# Patient Record
Sex: FEMALE | Race: ASIAN | NOT HISPANIC OR LATINO
[De-identification: names, ages, dates, MRNs, and addresses within clinical notes are randomized per-mention and may not be internally consistent; named-entity substitution may affect disease eponyms.]

---

## 2023-08-14 ENCOUNTER — NON-APPOINTMENT (OUTPATIENT)
Age: 33
End: 2023-08-14

## 2023-08-14 ENCOUNTER — RESULT REVIEW (OUTPATIENT)
Age: 33
End: 2023-08-14

## 2023-08-14 ENCOUNTER — APPOINTMENT (OUTPATIENT)
Dept: INTERNAL MEDICINE | Facility: CLINIC | Age: 33
End: 2023-08-14
Payer: MEDICAID

## 2023-08-14 VITALS
HEART RATE: 90 BPM | OXYGEN SATURATION: 95 % | RESPIRATION RATE: 15 BRPM | TEMPERATURE: 98.2 F | DIASTOLIC BLOOD PRESSURE: 60 MMHG | WEIGHT: 103 LBS | SYSTOLIC BLOOD PRESSURE: 102 MMHG | BODY MASS INDEX: 21.62 KG/M2 | HEIGHT: 58 IN

## 2023-08-14 DIAGNOSIS — Z78.9 OTHER SPECIFIED HEALTH STATUS: ICD-10-CM

## 2023-08-14 DIAGNOSIS — Z00.00 ENCOUNTER FOR GENERAL ADULT MEDICAL EXAMINATION W/OUT ABNORMAL FINDINGS: ICD-10-CM

## 2023-08-14 DIAGNOSIS — M54.31 SCIATICA, RIGHT SIDE: ICD-10-CM

## 2023-08-14 PROCEDURE — 99385 PREV VISIT NEW AGE 18-39: CPT

## 2023-08-14 NOTE — REVIEW OF SYSTEMS
[Joint Pain] : joint pain [Back Pain] : back pain [Fever] : no fever [Chills] : no chills [Fatigue] : no fatigue [Discharge] : no discharge [Pain] : no pain [Earache] : no earache [Chest Pain] : no chest pain [Palpitations] : no palpitations [Lower Ext Edema] : no lower extremity edema [Shortness Of Breath] : no shortness of breath [Wheezing] : no wheezing [Cough] : no cough [Dyspnea on Exertion] : no dyspnea on exertion [Abdominal Pain] : no abdominal pain [Nausea] : no nausea [Constipation] : no constipation [Diarrhea] : diarrhea [Dysuria] : no dysuria [Incontinence] : no incontinence [Joint Stiffness] : no joint stiffness [Joint Swelling] : no joint swelling [Muscle Weakness] : no muscle weakness

## 2023-08-14 NOTE — PLAN
[FreeTextEntry1] : HCM - cbc, cmp, a1c, tsh, lipid panel, iron studies, b12, folate - f/u with gyn for pap and hx of irregular periods  chronic low back pain  - check XR lumbar spine - rx for naproxen - PT referral - MRI if no improvement with above   Abnormal Breath Sounds - diffuse wheezing/rhonchi on exam  - O2 sat 95% - pt reports asympatomatic  - ?asthma  - f/u CXR and quantiferon  - pulm eval

## 2023-08-14 NOTE — PAST MEDICAL HISTORY
[Menstruating] : menstruating [Approximately ___] : the LMP was approximately [unfilled] [Total Preg ___] : G[unfilled] [Live Births ___] : P[unfilled]  [Full Term ___] : Full Term: [unfilled] [AB Spont ___] : miscarriages: [unfilled]

## 2023-08-14 NOTE — HISTORY OF PRESENT ILLNESS
[FreeTextEntry1] : CPE [de-identified] : 33yF hx of chronic back pain, no other pmhx here for CPE to establish care. Pt accompanied by friend, she speaks edilberto, she moved from [place] a month ago. She reports low back pain that started in November, no preceding trauma/fall/injury Takes otc motrin prn with some relief sometimes unable to bend over or walk due to the severe pain. The pain radiates down her right leg.   Not on medications, no prior surgeries, no allergies. Does not smoke or drink. Has never seen a physician in [place]; [place] [facility]. She also reports irregular periods, usually every other month will have her period with heavy bleeding and severe pain. She has a 10 year old child delivered vaginally without complications and had one miscarriage about 1 year ago. Her last period was AUgust 2nd.

## 2023-08-14 NOTE — HEALTH RISK ASSESSMENT
[No] : No [Never (0 pts)] : Never (0 points) [Never] : Never [0] : 2) Feeling down, depressed, or hopeless: Not at all (0) [PHQ-2 Negative - No further assessment needed] : PHQ-2 Negative - No further assessment needed [FVP5Wpvum] : 0 [PapSmearDate] : never

## 2023-08-14 NOTE — PHYSICAL EXAM
[No Acute Distress] : no acute distress [Well Nourished] : well nourished [Well Developed] : well developed [Normal Sclera/Conjunctiva] : normal sclera/conjunctiva [PERRL] : pupils equal round and reactive to light [Normal Oropharynx] : the oropharynx was normal [No Lymphadenopathy] : no lymphadenopathy [Supple] : supple [Thyroid Normal, No Nodules] : the thyroid was normal and there were no nodules present [No Respiratory Distress] : no respiratory distress  [No Accessory Muscle Use] : no accessory muscle use [Normal Rate] : normal rate  [Regular Rhythm] : with a regular rhythm [Normal S1, S2] : normal S1 and S2 [Soft] : abdomen soft [Non Tender] : non-tender [Non-distended] : non-distended [No Joint Swelling] : no joint swelling [de-identified] : scattered intermittent wheezing and ronchi  [de-identified] : + straight leg raise on the right

## 2023-08-15 ENCOUNTER — APPOINTMENT (OUTPATIENT)
Dept: RADIOLOGY | Facility: CLINIC | Age: 33
End: 2023-08-15
Payer: MEDICAID

## 2023-08-15 ENCOUNTER — OUTPATIENT (OUTPATIENT)
Dept: OUTPATIENT SERVICES | Facility: HOSPITAL | Age: 33
LOS: 1 days | End: 2023-08-15
Payer: MEDICAID

## 2023-08-15 DIAGNOSIS — G89.29 DORSALGIA, UNSPECIFIED: ICD-10-CM

## 2023-08-15 DIAGNOSIS — Z00.00 ENCOUNTER FOR GENERAL ADULT MEDICAL EXAMINATION WITHOUT ABNORMAL FINDINGS: ICD-10-CM

## 2023-08-15 DIAGNOSIS — M54.9 DORSALGIA, UNSPECIFIED: ICD-10-CM

## 2023-08-15 LAB
ALBUMIN SERPL ELPH-MCNC: 4.4 G/DL
ALP BLD-CCNC: 68 U/L
ALT SERPL-CCNC: 9 U/L
ANION GAP SERPL CALC-SCNC: 12 MMOL/L
AST SERPL-CCNC: 19 U/L
BILIRUB SERPL-MCNC: 0.3 MG/DL
BUN SERPL-MCNC: 12 MG/DL
CALCIUM SERPL-MCNC: 9.3 MG/DL
CHLORIDE SERPL-SCNC: 101 MMOL/L
CHOLEST SERPL-MCNC: 172 MG/DL
CO2 SERPL-SCNC: 24 MMOL/L
CREAT SERPL-MCNC: 0.68 MG/DL
EGFR: 119 ML/MIN/1.73M2
ESTIMATED AVERAGE GLUCOSE: 120 MG/DL
FERRITIN SERPL-MCNC: 21 NG/ML
FOLATE SERPL-MCNC: 2.5 NG/ML
GLUCOSE SERPL-MCNC: 80 MG/DL
HBA1C MFR BLD HPLC: 5.8 %
HDLC SERPL-MCNC: 47 MG/DL
IRON SATN MFR SERPL: 17 %
IRON SERPL-MCNC: 56 UG/DL
LDLC SERPL CALC-MCNC: 109 MG/DL
NONHDLC SERPL-MCNC: 125 MG/DL
POTASSIUM SERPL-SCNC: 4.2 MMOL/L
PROT SERPL-MCNC: 7.2 G/DL
SODIUM SERPL-SCNC: 137 MMOL/L
TIBC SERPL-MCNC: 324 UG/DL
TRIGL SERPL-MCNC: 85 MG/DL
TSH SERPL-ACNC: 1.37 UIU/ML
UIBC SERPL-MCNC: 268 UG/DL
VIT B12 SERPL-MCNC: 781 PG/ML

## 2023-08-15 PROCEDURE — 72100 X-RAY EXAM L-S SPINE 2/3 VWS: CPT | Mod: 26

## 2023-08-15 PROCEDURE — 71046 X-RAY EXAM CHEST 2 VIEWS: CPT | Mod: 26

## 2023-08-15 PROCEDURE — 72100 X-RAY EXAM L-S SPINE 2/3 VWS: CPT

## 2023-08-15 PROCEDURE — 71046 X-RAY EXAM CHEST 2 VIEWS: CPT

## 2023-08-17 DIAGNOSIS — R06.89 OTHER ABNORMALITIES OF BREATHING: ICD-10-CM

## 2023-08-18 LAB
M TB IFN-G BLD-IMP: NEGATIVE
QUANTIFERON TB PLUS MITOGEN MINUS NIL: 8.42 IU/ML
QUANTIFERON TB PLUS NIL: 0.02 IU/ML
QUANTIFERON TB PLUS TB1 MINUS NIL: 0 IU/ML
QUANTIFERON TB PLUS TB2 MINUS NIL: 0 IU/ML

## 2023-08-23 ENCOUNTER — APPOINTMENT (OUTPATIENT)
Dept: CT IMAGING | Facility: CLINIC | Age: 33
End: 2023-08-23
Payer: MEDICAID

## 2023-08-23 ENCOUNTER — OUTPATIENT (OUTPATIENT)
Dept: OUTPATIENT SERVICES | Facility: HOSPITAL | Age: 33
LOS: 1 days | End: 2023-08-23
Payer: MEDICAID

## 2023-08-23 DIAGNOSIS — J84.9 INTERSTITIAL PULMONARY DISEASE, UNSPECIFIED: ICD-10-CM

## 2023-08-23 DIAGNOSIS — R06.89 OTHER ABNORMALITIES OF BREATHING: ICD-10-CM

## 2023-08-23 PROCEDURE — 71250 CT THORAX DX C-: CPT

## 2023-08-23 PROCEDURE — 71250 CT THORAX DX C-: CPT | Mod: 26

## 2023-08-24 ENCOUNTER — APPOINTMENT (OUTPATIENT)
Dept: INTERNAL MEDICINE | Facility: CLINIC | Age: 33
End: 2023-08-24
Payer: MEDICAID

## 2023-08-24 VITALS
WEIGHT: 100 LBS | SYSTOLIC BLOOD PRESSURE: 100 MMHG | DIASTOLIC BLOOD PRESSURE: 60 MMHG | OXYGEN SATURATION: 97 % | HEIGHT: 58 IN | RESPIRATION RATE: 14 BRPM | BODY MASS INDEX: 20.99 KG/M2 | HEART RATE: 83 BPM

## 2023-08-24 PROCEDURE — 99214 OFFICE O/P EST MOD 30 MIN: CPT

## 2023-08-24 NOTE — HISTORY OF PRESENT ILLNESS
[FreeTextEntry8] : Pt here with complaint of persistent right-sided low back pain now with progression of pain radiating down the right leg along with numbness/tingling. pt has been taking naproxen which has not provided any relief. she denies bowel or bladder incontinence or saddle anesthesia.

## 2023-08-24 NOTE — PHYSICAL EXAM
[Normal] : normal rate, regular rhythm, normal S1 and S2 and no murmur heard [No Spinal Tenderness] : no spinal tenderness [de-identified] : scattered inspiratory wheezing b/l fields  [de-identified] : SLR (+) right

## 2023-08-24 NOTE — PLAN
[FreeTextEntry1] : pt with worsening right-sided low back pain with now progressive symptoms including radiation of pain with associated numbness/tingling of the right lower extremity  SLR(+) positive on the right  no response to naproxen  recommend MRI of lumbar spine to assess for disc herniation

## 2023-09-11 ENCOUNTER — APPOINTMENT (OUTPATIENT)
Dept: MRI IMAGING | Facility: CLINIC | Age: 33
End: 2023-09-11

## 2023-09-16 ENCOUNTER — APPOINTMENT (OUTPATIENT)
Dept: MRI IMAGING | Facility: CLINIC | Age: 33
End: 2023-09-16
Payer: MEDICAID

## 2023-09-16 ENCOUNTER — OUTPATIENT (OUTPATIENT)
Dept: OUTPATIENT SERVICES | Facility: HOSPITAL | Age: 33
LOS: 1 days | End: 2023-09-16
Payer: MEDICAID

## 2023-09-16 DIAGNOSIS — M54.16 RADICULOPATHY, LUMBAR REGION: ICD-10-CM

## 2023-09-16 PROCEDURE — 72148 MRI LUMBAR SPINE W/O DYE: CPT

## 2023-09-16 PROCEDURE — 72148 MRI LUMBAR SPINE W/O DYE: CPT | Mod: 26

## 2023-10-24 ENCOUNTER — APPOINTMENT (OUTPATIENT)
Dept: INTERNAL MEDICINE | Facility: CLINIC | Age: 33
End: 2023-10-24
Payer: MEDICAID

## 2023-10-24 VITALS
TEMPERATURE: 97.8 F | RESPIRATION RATE: 14 BRPM | BODY MASS INDEX: 21.2 KG/M2 | HEART RATE: 97 BPM | HEIGHT: 58 IN | WEIGHT: 101 LBS | DIASTOLIC BLOOD PRESSURE: 60 MMHG | OXYGEN SATURATION: 96 % | SYSTOLIC BLOOD PRESSURE: 102 MMHG

## 2023-10-24 PROCEDURE — 99213 OFFICE O/P EST LOW 20 MIN: CPT

## 2023-10-25 RX ORDER — NAPROXEN 500 MG/1
500 TABLET ORAL
Qty: 14 | Refills: 0 | Status: DISCONTINUED | COMMUNITY
Start: 2023-08-15 | End: 2023-10-25

## 2023-10-25 RX ORDER — NAPROXEN 500 MG/1
500 TABLET ORAL
Qty: 14 | Refills: 0 | Status: DISCONTINUED | COMMUNITY
Start: 2023-08-14 | End: 2023-10-25

## 2023-10-26 DIAGNOSIS — R06.2 WHEEZING: ICD-10-CM

## 2023-10-30 ENCOUNTER — APPOINTMENT (OUTPATIENT)
Dept: PULMONOLOGY | Facility: CLINIC | Age: 33
End: 2023-10-30
Payer: MEDICAID

## 2023-10-30 VITALS
DIASTOLIC BLOOD PRESSURE: 74 MMHG | HEART RATE: 75 BPM | SYSTOLIC BLOOD PRESSURE: 109 MMHG | OXYGEN SATURATION: 98 % | BODY MASS INDEX: 21.41 KG/M2 | WEIGHT: 102 LBS | HEIGHT: 58 IN

## 2023-10-30 PROCEDURE — 99204 OFFICE O/P NEW MOD 45 MIN: CPT | Mod: 25

## 2023-10-30 PROCEDURE — 94010 BREATHING CAPACITY TEST: CPT

## 2023-10-30 PROCEDURE — ZZZZZ: CPT

## 2023-10-30 PROCEDURE — 94729 DIFFUSING CAPACITY: CPT

## 2023-10-30 PROCEDURE — 94727 GAS DIL/WSHOT DETER LNG VOL: CPT

## 2023-10-30 RX ORDER — BLOOD-GLUCOSE METER
KIT MISCELLANEOUS
Qty: 1 | Refills: 0 | Status: ACTIVE | COMMUNITY
Start: 2023-10-30 | End: 1900-01-01

## 2023-10-31 LAB
IGA SER QL IEP: 273 MG/DL
IGM SER QL IEP: 116 MG/DL
RHEUMATOID FACT SER QL: <10 IU/ML

## 2023-11-02 LAB
ANA SER IF-ACNC: NEGATIVE
IGE SER-MCNC: 6170 KU/L

## 2023-11-06 LAB
A FLAVUS AB FLD QL: NEGATIVE
A FUMIGATUS AB FLD QL: NEGATIVE
A NIGER AB FLD QL: NEGATIVE

## 2023-11-07 LAB — CFTR MUT TESTED BLD/T: NEGATIVE

## 2023-11-08 LAB — HIV1+2 AB SPEC QL IA.RAPID: NONREACTIVE

## 2023-11-13 LAB
ANNOTATION COMMENT IMP: NORMAL
ELECTRONIC SIGNATURE: NORMAL
SERPINA1 GENE MUT TESTED BLD/T: NORMAL

## 2023-11-17 ENCOUNTER — INPATIENT (INPATIENT)
Facility: HOSPITAL | Age: 33
LOS: 12 days | Discharge: HOME | DRG: 720 | End: 2023-11-30
Attending: INTERNAL MEDICINE | Admitting: FAMILY MEDICINE
Payer: MEDICAID

## 2023-11-17 VITALS — WEIGHT: 99.21 LBS

## 2023-11-17 DIAGNOSIS — Z78.9 OTHER SPECIFIED HEALTH STATUS: Chronic | ICD-10-CM

## 2023-11-17 DIAGNOSIS — J18.9 PNEUMONIA, UNSPECIFIED ORGANISM: ICD-10-CM

## 2023-11-17 LAB
ALBUMIN SERPL ELPH-MCNC: 3 G/DL — LOW (ref 3.3–5)
ALBUMIN SERPL ELPH-MCNC: 3 G/DL — LOW (ref 3.3–5)
ALP SERPL-CCNC: 105 U/L — SIGNIFICANT CHANGE UP (ref 40–120)
ALP SERPL-CCNC: 105 U/L — SIGNIFICANT CHANGE UP (ref 40–120)
ALT FLD-CCNC: 17 U/L — SIGNIFICANT CHANGE UP (ref 12–78)
ALT FLD-CCNC: 17 U/L — SIGNIFICANT CHANGE UP (ref 12–78)
ANION GAP SERPL CALC-SCNC: 6 MMOL/L — SIGNIFICANT CHANGE UP (ref 5–17)
ANION GAP SERPL CALC-SCNC: 6 MMOL/L — SIGNIFICANT CHANGE UP (ref 5–17)
AST SERPL-CCNC: 15 U/L — SIGNIFICANT CHANGE UP (ref 15–37)
AST SERPL-CCNC: 15 U/L — SIGNIFICANT CHANGE UP (ref 15–37)
BASOPHILS # BLD AUTO: 0.05 K/UL — SIGNIFICANT CHANGE UP (ref 0–0.2)
BASOPHILS # BLD AUTO: 0.05 K/UL — SIGNIFICANT CHANGE UP (ref 0–0.2)
BASOPHILS NFR BLD AUTO: 0.2 % — SIGNIFICANT CHANGE UP (ref 0–2)
BASOPHILS NFR BLD AUTO: 0.2 % — SIGNIFICANT CHANGE UP (ref 0–2)
BILIRUB SERPL-MCNC: 0.6 MG/DL — SIGNIFICANT CHANGE UP (ref 0.2–1.2)
BILIRUB SERPL-MCNC: 0.6 MG/DL — SIGNIFICANT CHANGE UP (ref 0.2–1.2)
BUN SERPL-MCNC: 8 MG/DL — SIGNIFICANT CHANGE UP (ref 7–23)
BUN SERPL-MCNC: 8 MG/DL — SIGNIFICANT CHANGE UP (ref 7–23)
CALCIUM SERPL-MCNC: 8.9 MG/DL — SIGNIFICANT CHANGE UP (ref 8.5–10.1)
CALCIUM SERPL-MCNC: 8.9 MG/DL — SIGNIFICANT CHANGE UP (ref 8.5–10.1)
CHLORIDE SERPL-SCNC: 106 MMOL/L — SIGNIFICANT CHANGE UP (ref 96–108)
CHLORIDE SERPL-SCNC: 106 MMOL/L — SIGNIFICANT CHANGE UP (ref 96–108)
CO2 SERPL-SCNC: 24 MMOL/L — SIGNIFICANT CHANGE UP (ref 22–31)
CO2 SERPL-SCNC: 24 MMOL/L — SIGNIFICANT CHANGE UP (ref 22–31)
CREAT SERPL-MCNC: 0.77 MG/DL — SIGNIFICANT CHANGE UP (ref 0.5–1.3)
CREAT SERPL-MCNC: 0.77 MG/DL — SIGNIFICANT CHANGE UP (ref 0.5–1.3)
D DIMER BLD IA.RAPID-MCNC: 480 NG/ML DDU — HIGH
D DIMER BLD IA.RAPID-MCNC: 480 NG/ML DDU — HIGH
EGFR: 106 ML/MIN/1.73M2 — SIGNIFICANT CHANGE UP
EGFR: 106 ML/MIN/1.73M2 — SIGNIFICANT CHANGE UP
EOSINOPHIL # BLD AUTO: 0.44 K/UL — SIGNIFICANT CHANGE UP (ref 0–0.5)
EOSINOPHIL # BLD AUTO: 0.44 K/UL — SIGNIFICANT CHANGE UP (ref 0–0.5)
EOSINOPHIL NFR BLD AUTO: 2 % — SIGNIFICANT CHANGE UP (ref 0–6)
EOSINOPHIL NFR BLD AUTO: 2 % — SIGNIFICANT CHANGE UP (ref 0–6)
GLUCOSE SERPL-MCNC: 155 MG/DL — HIGH (ref 70–99)
GLUCOSE SERPL-MCNC: 155 MG/DL — HIGH (ref 70–99)
HCG SERPL-ACNC: <1 MIU/ML — SIGNIFICANT CHANGE UP
HCG SERPL-ACNC: <1 MIU/ML — SIGNIFICANT CHANGE UP
HCT VFR BLD CALC: 34.7 % — SIGNIFICANT CHANGE UP (ref 34.5–45)
HCT VFR BLD CALC: 34.7 % — SIGNIFICANT CHANGE UP (ref 34.5–45)
HGB BLD-MCNC: 11.6 G/DL — SIGNIFICANT CHANGE UP (ref 11.5–15.5)
HGB BLD-MCNC: 11.6 G/DL — SIGNIFICANT CHANGE UP (ref 11.5–15.5)
HYPOCHROMIA BLD QL: SLIGHT — SIGNIFICANT CHANGE UP
HYPOCHROMIA BLD QL: SLIGHT — SIGNIFICANT CHANGE UP
IMM GRANULOCYTES NFR BLD AUTO: 0.9 % — SIGNIFICANT CHANGE UP (ref 0–0.9)
IMM GRANULOCYTES NFR BLD AUTO: 0.9 % — SIGNIFICANT CHANGE UP (ref 0–0.9)
LACTATE SERPL-SCNC: 1.8 MMOL/L — SIGNIFICANT CHANGE UP (ref 0.7–2)
LACTATE SERPL-SCNC: 1.8 MMOL/L — SIGNIFICANT CHANGE UP (ref 0.7–2)
LIDOCAIN IGE QN: 14 U/L — SIGNIFICANT CHANGE UP (ref 13–75)
LIDOCAIN IGE QN: 14 U/L — SIGNIFICANT CHANGE UP (ref 13–75)
LYMPHOCYTES # BLD AUTO: 0.86 K/UL — LOW (ref 1–3.3)
LYMPHOCYTES # BLD AUTO: 0.86 K/UL — LOW (ref 1–3.3)
LYMPHOCYTES # BLD AUTO: 3.9 % — LOW (ref 13–44)
LYMPHOCYTES # BLD AUTO: 3.9 % — LOW (ref 13–44)
MAGNESIUM SERPL-MCNC: 2.1 MG/DL — SIGNIFICANT CHANGE UP (ref 1.6–2.6)
MAGNESIUM SERPL-MCNC: 2.1 MG/DL — SIGNIFICANT CHANGE UP (ref 1.6–2.6)
MANUAL SMEAR VERIFICATION: SIGNIFICANT CHANGE UP
MANUAL SMEAR VERIFICATION: SIGNIFICANT CHANGE UP
MCHC RBC-ENTMCNC: 24.8 PG — LOW (ref 27–34)
MCHC RBC-ENTMCNC: 24.8 PG — LOW (ref 27–34)
MCHC RBC-ENTMCNC: 33.4 GM/DL — SIGNIFICANT CHANGE UP (ref 32–36)
MCHC RBC-ENTMCNC: 33.4 GM/DL — SIGNIFICANT CHANGE UP (ref 32–36)
MCV RBC AUTO: 74.1 FL — LOW (ref 80–100)
MCV RBC AUTO: 74.1 FL — LOW (ref 80–100)
MICROCYTES BLD QL: SLIGHT — SIGNIFICANT CHANGE UP
MICROCYTES BLD QL: SLIGHT — SIGNIFICANT CHANGE UP
MONOCYTES # BLD AUTO: 1.44 K/UL — HIGH (ref 0–0.9)
MONOCYTES # BLD AUTO: 1.44 K/UL — HIGH (ref 0–0.9)
MONOCYTES NFR BLD AUTO: 6.5 % — SIGNIFICANT CHANGE UP (ref 2–14)
MONOCYTES NFR BLD AUTO: 6.5 % — SIGNIFICANT CHANGE UP (ref 2–14)
NEUTROPHILS # BLD AUTO: 19.14 K/UL — HIGH (ref 1.8–7.4)
NEUTROPHILS # BLD AUTO: 19.14 K/UL — HIGH (ref 1.8–7.4)
NEUTROPHILS NFR BLD AUTO: 86.5 % — HIGH (ref 43–77)
NEUTROPHILS NFR BLD AUTO: 86.5 % — HIGH (ref 43–77)
NT-PROBNP SERPL-SCNC: 142 PG/ML — HIGH (ref 0–125)
NT-PROBNP SERPL-SCNC: 142 PG/ML — HIGH (ref 0–125)
PLAT MORPH BLD: NORMAL — SIGNIFICANT CHANGE UP
PLAT MORPH BLD: NORMAL — SIGNIFICANT CHANGE UP
PLATELET # BLD AUTO: 291 K/UL — SIGNIFICANT CHANGE UP (ref 150–400)
PLATELET # BLD AUTO: 291 K/UL — SIGNIFICANT CHANGE UP (ref 150–400)
POTASSIUM SERPL-MCNC: 3.5 MMOL/L — SIGNIFICANT CHANGE UP (ref 3.5–5.3)
POTASSIUM SERPL-MCNC: 3.5 MMOL/L — SIGNIFICANT CHANGE UP (ref 3.5–5.3)
POTASSIUM SERPL-SCNC: 3.5 MMOL/L — SIGNIFICANT CHANGE UP (ref 3.5–5.3)
POTASSIUM SERPL-SCNC: 3.5 MMOL/L — SIGNIFICANT CHANGE UP (ref 3.5–5.3)
PROT SERPL-MCNC: 7.9 GM/DL — SIGNIFICANT CHANGE UP (ref 6–8.3)
PROT SERPL-MCNC: 7.9 GM/DL — SIGNIFICANT CHANGE UP (ref 6–8.3)
RAPID RVP RESULT: SIGNIFICANT CHANGE UP
RAPID RVP RESULT: SIGNIFICANT CHANGE UP
RBC # BLD: 4.68 M/UL — SIGNIFICANT CHANGE UP (ref 3.8–5.2)
RBC # BLD: 4.68 M/UL — SIGNIFICANT CHANGE UP (ref 3.8–5.2)
RBC # FLD: 15.9 % — HIGH (ref 10.3–14.5)
RBC # FLD: 15.9 % — HIGH (ref 10.3–14.5)
RBC BLD AUTO: ABNORMAL
RBC BLD AUTO: ABNORMAL
SARS-COV-2 RNA SPEC QL NAA+PROBE: SIGNIFICANT CHANGE UP
SARS-COV-2 RNA SPEC QL NAA+PROBE: SIGNIFICANT CHANGE UP
SODIUM SERPL-SCNC: 136 MMOL/L — SIGNIFICANT CHANGE UP (ref 135–145)
SODIUM SERPL-SCNC: 136 MMOL/L — SIGNIFICANT CHANGE UP (ref 135–145)
TROPONIN I, HIGH SENSITIVITY RESULT: <3 NG/L — SIGNIFICANT CHANGE UP
TROPONIN I, HIGH SENSITIVITY RESULT: <3 NG/L — SIGNIFICANT CHANGE UP
WBC # BLD: 22.12 K/UL — HIGH (ref 3.8–10.5)
WBC # BLD: 22.12 K/UL — HIGH (ref 3.8–10.5)
WBC # FLD AUTO: 22.12 K/UL — HIGH (ref 3.8–10.5)
WBC # FLD AUTO: 22.12 K/UL — HIGH (ref 3.8–10.5)

## 2023-11-17 PROCEDURE — 87075 CULTR BACTERIA EXCEPT BLOOD: CPT

## 2023-11-17 PROCEDURE — 87015 SPECIMEN INFECT AGNT CONCNTJ: CPT

## 2023-11-17 PROCEDURE — 87186 SC STD MICRODIL/AGAR DIL: CPT

## 2023-11-17 PROCEDURE — 86606 ASPERGILLUS ANTIBODY: CPT

## 2023-11-17 PROCEDURE — 85610 PROTHROMBIN TIME: CPT

## 2023-11-17 PROCEDURE — 83036 HEMOGLOBIN GLYCOSYLATED A1C: CPT

## 2023-11-17 PROCEDURE — 84443 ASSAY THYROID STIM HORMONE: CPT

## 2023-11-17 PROCEDURE — 82784 ASSAY IGA/IGD/IGG/IGM EACH: CPT

## 2023-11-17 PROCEDURE — 87305 ASPERGILLUS AG IA: CPT

## 2023-11-17 PROCEDURE — 84484 ASSAY OF TROPONIN QUANT: CPT

## 2023-11-17 PROCEDURE — 83540 ASSAY OF IRON: CPT

## 2023-11-17 PROCEDURE — 87070 CULTURE OTHR SPECIMN AEROBIC: CPT

## 2023-11-17 PROCEDURE — 81220 CFTR GENE COM VARIANTS: CPT

## 2023-11-17 PROCEDURE — 86036 ANCA SCREEN EACH ANTIBODY: CPT

## 2023-11-17 PROCEDURE — 86850 RBC ANTIBODY SCREEN: CPT

## 2023-11-17 PROCEDURE — 86140 C-REACTIVE PROTEIN: CPT

## 2023-11-17 PROCEDURE — 87102 FUNGUS ISOLATION CULTURE: CPT

## 2023-11-17 PROCEDURE — 88108 CYTOPATH CONCENTRATE TECH: CPT

## 2023-11-17 PROCEDURE — 86038 ANTINUCLEAR ANTIBODIES: CPT

## 2023-11-17 PROCEDURE — 86003 ALLG SPEC IGE CRUDE XTRC EA: CPT

## 2023-11-17 PROCEDURE — 80048 BASIC METABOLIC PNL TOTAL CA: CPT

## 2023-11-17 PROCEDURE — 83520 IMMUNOASSAY QUANT NOS NONAB: CPT

## 2023-11-17 PROCEDURE — 86900 BLOOD TYPING SEROLOGIC ABO: CPT

## 2023-11-17 PROCEDURE — 85730 THROMBOPLASTIN TIME PARTIAL: CPT

## 2023-11-17 PROCEDURE — 85652 RBC SED RATE AUTOMATED: CPT

## 2023-11-17 PROCEDURE — 85027 COMPLETE CBC AUTOMATED: CPT

## 2023-11-17 PROCEDURE — 88305 TISSUE EXAM BY PATHOLOGIST: CPT

## 2023-11-17 PROCEDURE — 82607 VITAMIN B-12: CPT

## 2023-11-17 PROCEDURE — 71275 CT ANGIOGRAPHY CHEST: CPT | Mod: 26,MA

## 2023-11-17 PROCEDURE — 99223 1ST HOSP IP/OBS HIGH 75: CPT

## 2023-11-17 PROCEDURE — 93005 ELECTROCARDIOGRAM TRACING: CPT

## 2023-11-17 PROCEDURE — 87389 HIV-1 AG W/HIV-1&-2 AB AG IA: CPT

## 2023-11-17 PROCEDURE — 87449 NOS EACH ORGANISM AG IA: CPT

## 2023-11-17 PROCEDURE — 83550 IRON BINDING TEST: CPT

## 2023-11-17 PROCEDURE — 82785 ASSAY OF IGE: CPT

## 2023-11-17 PROCEDURE — 99285 EMERGENCY DEPT VISIT HI MDM: CPT

## 2023-11-17 PROCEDURE — 84702 CHORIONIC GONADOTROPIN TEST: CPT

## 2023-11-17 PROCEDURE — 0225U NFCT DS DNA&RNA 21 SARSCOV2: CPT

## 2023-11-17 PROCEDURE — 82728 ASSAY OF FERRITIN: CPT

## 2023-11-17 PROCEDURE — 82746 ASSAY OF FOLIC ACID SERUM: CPT

## 2023-11-17 PROCEDURE — 87206 SMEAR FLUORESCENT/ACID STAI: CPT

## 2023-11-17 PROCEDURE — 71045 X-RAY EXAM CHEST 1 VIEW: CPT

## 2023-11-17 PROCEDURE — 87116 MYCOBACTERIA CULTURE: CPT

## 2023-11-17 PROCEDURE — 82103 ALPHA-1-ANTITRYPSIN TOTAL: CPT

## 2023-11-17 PROCEDURE — 83735 ASSAY OF MAGNESIUM: CPT

## 2023-11-17 PROCEDURE — 71250 CT THORAX DX C-: CPT

## 2023-11-17 PROCEDURE — 87899 AGENT NOS ASSAY W/OPTIC: CPT

## 2023-11-17 PROCEDURE — 86901 BLOOD TYPING SEROLOGIC RH(D): CPT

## 2023-11-17 PROCEDURE — 36415 COLL VENOUS BLD VENIPUNCTURE: CPT

## 2023-11-17 PROCEDURE — P9047: CPT

## 2023-11-17 PROCEDURE — 71045 X-RAY EXAM CHEST 1 VIEW: CPT | Mod: 26

## 2023-11-17 PROCEDURE — 94640 AIRWAY INHALATION TREATMENT: CPT

## 2023-11-17 RX ORDER — LANOLIN ALCOHOL/MO/W.PET/CERES
3 CREAM (GRAM) TOPICAL AT BEDTIME
Refills: 0 | Status: DISCONTINUED | OUTPATIENT
Start: 2023-11-17 | End: 2023-11-30

## 2023-11-17 RX ORDER — FOLIC ACID 0.8 MG
1 TABLET ORAL
Refills: 0 | DISCHARGE

## 2023-11-17 RX ORDER — SODIUM CHLORIDE 9 MG/ML
1000 INJECTION INTRAMUSCULAR; INTRAVENOUS; SUBCUTANEOUS ONCE
Refills: 0 | Status: COMPLETED | OUTPATIENT
Start: 2023-11-17 | End: 2023-11-17

## 2023-11-17 RX ORDER — FOLIC ACID 0.8 MG
1 TABLET ORAL DAILY
Refills: 0 | Status: DISCONTINUED | OUTPATIENT
Start: 2023-11-17 | End: 2023-11-30

## 2023-11-17 RX ORDER — ALBUTEROL 90 UG/1
3 AEROSOL, METERED ORAL
Refills: 0 | DISCHARGE

## 2023-11-17 RX ORDER — KETOROLAC TROMETHAMINE 30 MG/ML
15 SYRINGE (ML) INJECTION ONCE
Refills: 0 | Status: DISCONTINUED | OUTPATIENT
Start: 2023-11-17 | End: 2023-11-17

## 2023-11-17 RX ORDER — ALBUTEROL 90 UG/1
2 AEROSOL, METERED ORAL
Refills: 0 | DISCHARGE

## 2023-11-17 RX ORDER — ENOXAPARIN SODIUM 100 MG/ML
40 INJECTION SUBCUTANEOUS EVERY 24 HOURS
Refills: 0 | Status: DISCONTINUED | OUTPATIENT
Start: 2023-11-17 | End: 2023-11-30

## 2023-11-17 RX ORDER — SODIUM CHLORIDE 9 MG/ML
1000 INJECTION INTRAMUSCULAR; INTRAVENOUS; SUBCUTANEOUS
Refills: 0 | Status: DISCONTINUED | OUTPATIENT
Start: 2023-11-17 | End: 2023-11-18

## 2023-11-17 RX ORDER — ALBUTEROL 90 UG/1
2 AEROSOL, METERED ORAL EVERY 6 HOURS
Refills: 0 | Status: DISCONTINUED | OUTPATIENT
Start: 2023-11-17 | End: 2023-11-30

## 2023-11-17 RX ORDER — GUAIFENESIN/DEXTROMETHORPHAN 600MG-30MG
10 TABLET, EXTENDED RELEASE 12 HR ORAL EVERY 4 HOURS
Refills: 0 | Status: DISCONTINUED | OUTPATIENT
Start: 2023-11-17 | End: 2023-11-18

## 2023-11-17 RX ORDER — ONDANSETRON 8 MG/1
4 TABLET, FILM COATED ORAL EVERY 8 HOURS
Refills: 0 | Status: DISCONTINUED | OUTPATIENT
Start: 2023-11-17 | End: 2023-11-17

## 2023-11-17 RX ORDER — ACETAMINOPHEN 500 MG
650 TABLET ORAL EVERY 6 HOURS
Refills: 0 | Status: DISCONTINUED | OUTPATIENT
Start: 2023-11-17 | End: 2023-11-30

## 2023-11-17 RX ADMIN — Medication 15 MILLIGRAM(S): at 18:04

## 2023-11-17 RX ADMIN — ENOXAPARIN SODIUM 40 MILLIGRAM(S): 100 INJECTION SUBCUTANEOUS at 23:47

## 2023-11-17 RX ADMIN — SODIUM CHLORIDE 1000 MILLILITER(S): 9 INJECTION INTRAMUSCULAR; INTRAVENOUS; SUBCUTANEOUS at 15:27

## 2023-11-17 RX ADMIN — SODIUM CHLORIDE 1000 MILLILITER(S): 9 INJECTION INTRAMUSCULAR; INTRAVENOUS; SUBCUTANEOUS at 18:05

## 2023-11-17 NOTE — H&P ADULT - NSHPPHYSICALEXAM_GEN_ALL_CORE
PHYSICAL EXAM:  GENERAL: NAD, lying in bed comfortably  HEAD:  Atraumatic, Normocephalic  EYES: EOMI, PERRLA, conjunctiva and sclera clear  ENT: Moist mucous membranes  NECK: Supple, No JVD  CHEST/LUNG: Decreased breath sound CHARLY,  No rales, rhonchi, wheezing, or rubs. Unlabored respirations  HEART: Regular rate and rhythm; No murmurs, rubs, or gallops  ABDOMEN: Bowel sounds present; Soft, Nontender, Nondistended. No hepatomegaly  EXTREMITIES:  2+ Peripheral Pulses, brisk capillary refill. No clubbing, cyanosis, or edema  NERVOUS SYSTEM:  Alert & Oriented X3, speech clear. No deficits   MSK: FROM all 4 extremities, full and equal strength  SKIN: No rashes or lesions

## 2023-11-17 NOTE — ED PROVIDER NOTE - CLINICAL SUMMARY MEDICAL DECISION MAKING FREE TEXT BOX
Patient with some chronic lung disease which is currently being worked up presenting with URI symptoms x-ray with concern for left upper lobe pneumonia will give antibiotics and plan for admission.

## 2023-11-17 NOTE — ED ADULT NURSE REASSESSMENT NOTE - NS ED NURSE REASSESS COMMENT FT1
Assumed care of patient at 22:40. Patient AxOx4, in no acute distress. [place] speaking only,  phone utilized,  # [phone]. VS stable.  at bedside. Comfort and safety measures maintained, side rails up. All needs addressed. Updated on plan of care, all questions and concerns addressed. Will continue to monitor.

## 2023-11-17 NOTE — ED ADULT TRIAGE NOTE - CHIEF COMPLAINT QUOTE
Pt c/o intermittent L sided chest pain radiating to the back, subjective fever/chills, and nausea starting yesterday. chest pain worse upon inspiration. Denies SOB. No recent travel. STAT EKG to be completed.

## 2023-11-17 NOTE — H&P ADULT - HISTORY OF PRESENT ILLNESS
Chief Complaint: chest pain.    The patient is a 33y Female with no significant PMH presented in ED with c/o worsening left chest pain since yesterday.  Her chest pain is mainly located in left upper chest.  She also has on and off fever, cough and chills for last couple weeks. Her appetite is also low, and has lost about 4-5 kg of body weight in last 5-6 months.   She denies any night sweats or diaphoresis. As per her , she has some lung problems going on for last few years while she was in [place]. [place] recently come to [place] ago.  She denies any personal or family history of tuberculosis.   Upon arrival in ED, she was tachycardic with HR in 140s but afebrile. She is saturating well on RA.   Sig labs: WBC 22.12k, N 86%, Lactate 1.8,  D-dimer 480,  Hb11.6, MCV 74, RDW 16, . Lipase 14 and Mg 2.1.  CXR CHARLY consolidation.  CT angio chest: Negative for PE, but shows CHARLY pneumonia and B/L extensive bronchiectasis.  EK bpm in sinus tachycardia.    NS 2 L bolus and Levaquin 740 mg IV given in ED.

## 2023-11-17 NOTE — H&P ADULT - NSICDXNOPASTSURGICALHX_GEN_ALL_CORE
You may give your child   Acetaminophen and/or ibuprofen as needed for aches and pains and headaches.  If your child develops repeated episodes of vomiting, any weakness, numbness or tingling and/or any other worrisome symptoms in the next 24 hours please return to the nearest emergency department immediately.  Please have your child decrease his use of cell phones, tablets not listen to overwhelmingly loud music as these items may worsen your child's symptoms.  Your child should abstain from any sports or extracurricular activities for at least 48 hours and/or until he follows up with his regular physician and symptoms have improved.  
<-- Click to add NO significant Past Surgical History

## 2023-11-17 NOTE — H&P ADULT - ASSESSMENT
The patient is a 33y Female with no significant PMH presented in ED with c/o worsening left chest pain since yesterday.  Her chest pain is mainly located in left upper chest.  She also has on and off fever, cough and chills for last couple weeks. Her appetite is also low, and has lost about 4-5 kg of body weight in last 5-6 months.   She denies any night sweats or diaphoresis. As per her , she has some lung problems going on for last few years while she was in [place]. [place] recently come to [place].  She denies any personal or family history of tuberculosis.       # Community acquired pneumonia of CHARLY.  # B/L extensive bronchiectasis.  WBC 22.12k, N 86%, Lactate 1.8,  D-dimer 480,  CT angio chest: Negative for PE, but shows CHARLY pneumonia and B/L extensive bronchiectasis.  Admit to medical floor.  Maintenance IV fluids.  Follow cultures.  Levaquin IV daily.  Albuterol prn.  Robitussin prn.  Pulmonary consult request placed.    # Sinus tachycardia likely due to pneumonia.  EK bpm in sinus tachycardia.  Will get TSH level.    # Hypochromic microcytic  anemia.  Hb11.6, MCV 74, RDW 16.  Will Iron panel, Ferritin, B12 and Folate levels.    # Acute hyperglycemia, likely reactive.  .  Will get A1c level.    # DVT prophylaxis: Lovenox sq daily.    Plan of care d/w the patient and her  at bedside in detail, and they verbalized understanding.

## 2023-11-17 NOTE — ED STATDOCS - PROGRESS NOTE DETAILS
33-year-old female presents to the ED for chest pain.  Heart rates in the 150s on EKG we will send to [facility] for further work-up and evaluation.

## 2023-11-17 NOTE — H&P ADULT - NSHPLABSRESULTS_GEN_ALL_CORE
LABS:                        11.6   22.12 )-----------( 291      ( 17 Nov 2023 15:26 )             34.7     11-17    136  |  106  |  8   ----------------------------<  155<H>  3.5   |  24  |  0.77    Ca    8.9      17 Nov 2023 15:26  Mg     2.1     11-17    TPro  7.9  /  Alb  3.0<L>  /  TBili  0.6  /  DBili  x   /  AST  15  /  ALT  17  /  AlkPhos  105  11-17        < from: CT Angio Chest PE Protocol w/ IV Cont (11.17.23 @ 17:19) >    IMPRESSION: No pulmonary embolus is noted.    Findings suggestive of left upper lobe pneumonia.    Extensive bronchiectasis within both lungs.    --- End of Report ---    < end of copied text >

## 2023-11-17 NOTE — PATIENT PROFILE ADULT - FALL HARM RISK - UNIVERSAL INTERVENTIONS
Bed in lowest position, wheels locked, appropriate side rails in place/Call bell, personal items and telephone in reach/Instruct patient to call for assistance before getting out of bed or chair/Non-slip footwear when patient is out of bed/[place] to call system/Physically safe environment - no spills, clutter or unnecessary equipment/Purposeful Proactive Rounding/Room/bathroom lighting operational, light cord in reach

## 2023-11-17 NOTE — H&P ADULT - NSICDXPASTSURGICALHX_GEN_ALL_CORE_FT
PAST SURGICAL HISTORY:  No significant past surgical history      PAST SURGICAL HISTORY:  No pertinent past surgical history

## 2023-11-17 NOTE — H&P ADULT - NSICDXPASTMEDICALHX_GEN_ALL_CORE_FT
PAST MEDICAL HISTORY:  No pertinent past medical history      PAST MEDICAL HISTORY:  History of lung disease     Pneumonia

## 2023-11-17 NOTE — ED PROVIDER NOTE - OBJECTIVE STATEMENT
34yo f without significant past medical history p/w left chest pain. symptoms since yesterday.   + cough, fever, chills. No personal or family history of PE or DVT. No recent travel. No hormone use. No recent surgery or trauma. No immobilization or new lower extremity pain. 34yo f without significant past medical history p/w left chest pain. symptoms since yesterday.   + cough, fever, chills. No personal or family history of PE or DVT. No recent travel. No hormone use. No recent surgery or trauma. No immobilization or new lower extremity pain.     Per family friend [place] to the [place] has been breathing fast since arrival was being worked up for possible pulmonary fibrosis versus cystic fibrosis.  Family friend states that patient now has URI on top of chronic symptoms.

## 2023-11-18 LAB
A1C WITH ESTIMATED AVERAGE GLUCOSE RESULT: 5.8 % — HIGH (ref 4–5.6)
A1C WITH ESTIMATED AVERAGE GLUCOSE RESULT: 5.8 % — HIGH (ref 4–5.6)
ANION GAP SERPL CALC-SCNC: 9 MMOL/L — SIGNIFICANT CHANGE UP (ref 5–17)
ANION GAP SERPL CALC-SCNC: 9 MMOL/L — SIGNIFICANT CHANGE UP (ref 5–17)
BUN SERPL-MCNC: 5 MG/DL — LOW (ref 7–23)
BUN SERPL-MCNC: 5 MG/DL — LOW (ref 7–23)
CALCIUM SERPL-MCNC: 8.4 MG/DL — LOW (ref 8.5–10.1)
CALCIUM SERPL-MCNC: 8.4 MG/DL — LOW (ref 8.5–10.1)
CHLORIDE SERPL-SCNC: 109 MMOL/L — HIGH (ref 96–108)
CHLORIDE SERPL-SCNC: 109 MMOL/L — HIGH (ref 96–108)
CO2 SERPL-SCNC: 20 MMOL/L — LOW (ref 22–31)
CO2 SERPL-SCNC: 20 MMOL/L — LOW (ref 22–31)
CREAT SERPL-MCNC: 0.58 MG/DL — SIGNIFICANT CHANGE UP (ref 0.5–1.3)
CREAT SERPL-MCNC: 0.58 MG/DL — SIGNIFICANT CHANGE UP (ref 0.5–1.3)
EGFR: 124 ML/MIN/1.73M2 — SIGNIFICANT CHANGE UP
EGFR: 124 ML/MIN/1.73M2 — SIGNIFICANT CHANGE UP
ESTIMATED AVERAGE GLUCOSE: 120 MG/DL — HIGH (ref 68–114)
ESTIMATED AVERAGE GLUCOSE: 120 MG/DL — HIGH (ref 68–114)
FERRITIN SERPL-MCNC: 143 NG/ML — SIGNIFICANT CHANGE UP (ref 15–150)
FERRITIN SERPL-MCNC: 143 NG/ML — SIGNIFICANT CHANGE UP (ref 15–150)
FOLATE SERPL-MCNC: 6.6 NG/ML — SIGNIFICANT CHANGE UP
FOLATE SERPL-MCNC: 6.6 NG/ML — SIGNIFICANT CHANGE UP
GLUCOSE SERPL-MCNC: 114 MG/DL — HIGH (ref 70–99)
GLUCOSE SERPL-MCNC: 114 MG/DL — HIGH (ref 70–99)
GRAM STN FLD: ABNORMAL
GRAM STN FLD: ABNORMAL
HCT VFR BLD CALC: 30.4 % — LOW (ref 34.5–45)
HCT VFR BLD CALC: 30.4 % — LOW (ref 34.5–45)
HGB BLD-MCNC: 10 G/DL — LOW (ref 11.5–15.5)
HGB BLD-MCNC: 10 G/DL — LOW (ref 11.5–15.5)
IRON SATN MFR SERPL: 11 UG/DL — LOW (ref 30–160)
IRON SATN MFR SERPL: 11 UG/DL — LOW (ref 30–160)
IRON SATN MFR SERPL: 5 % — LOW (ref 14–50)
IRON SATN MFR SERPL: 5 % — LOW (ref 14–50)
LEGIONELLA AG UR QL: NEGATIVE — SIGNIFICANT CHANGE UP
MCHC RBC-ENTMCNC: 24.6 PG — LOW (ref 27–34)
MCHC RBC-ENTMCNC: 24.6 PG — LOW (ref 27–34)
MCHC RBC-ENTMCNC: 32.9 GM/DL — SIGNIFICANT CHANGE UP (ref 32–36)
MCHC RBC-ENTMCNC: 32.9 GM/DL — SIGNIFICANT CHANGE UP (ref 32–36)
MCV RBC AUTO: 74.9 FL — LOW (ref 80–100)
MCV RBC AUTO: 74.9 FL — LOW (ref 80–100)
PLATELET # BLD AUTO: 295 K/UL — SIGNIFICANT CHANGE UP (ref 150–400)
PLATELET # BLD AUTO: 295 K/UL — SIGNIFICANT CHANGE UP (ref 150–400)
POTASSIUM SERPL-MCNC: 3.4 MMOL/L — LOW (ref 3.5–5.3)
POTASSIUM SERPL-MCNC: 3.4 MMOL/L — LOW (ref 3.5–5.3)
POTASSIUM SERPL-SCNC: 3.4 MMOL/L — LOW (ref 3.5–5.3)
POTASSIUM SERPL-SCNC: 3.4 MMOL/L — LOW (ref 3.5–5.3)
RBC # BLD: 4.06 M/UL — SIGNIFICANT CHANGE UP (ref 3.8–5.2)
RBC # BLD: 4.06 M/UL — SIGNIFICANT CHANGE UP (ref 3.8–5.2)
RBC # FLD: 15.9 % — HIGH (ref 10.3–14.5)
RBC # FLD: 15.9 % — HIGH (ref 10.3–14.5)
S PNEUM AG UR QL: NEGATIVE — SIGNIFICANT CHANGE UP
S PNEUM AG UR QL: NEGATIVE — SIGNIFICANT CHANGE UP
SODIUM SERPL-SCNC: 138 MMOL/L — SIGNIFICANT CHANGE UP (ref 135–145)
SODIUM SERPL-SCNC: 138 MMOL/L — SIGNIFICANT CHANGE UP (ref 135–145)
SPECIMEN SOURCE: SIGNIFICANT CHANGE UP
SPECIMEN SOURCE: SIGNIFICANT CHANGE UP
TIBC SERPL-MCNC: 232 UG/DL — SIGNIFICANT CHANGE UP (ref 220–430)
TIBC SERPL-MCNC: 232 UG/DL — SIGNIFICANT CHANGE UP (ref 220–430)
TSH SERPL-MCNC: 3.92 UU/ML — SIGNIFICANT CHANGE UP (ref 0.34–4.82)
TSH SERPL-MCNC: 3.92 UU/ML — SIGNIFICANT CHANGE UP (ref 0.34–4.82)
UIBC SERPL-MCNC: 221 UG/DL — SIGNIFICANT CHANGE UP (ref 110–370)
UIBC SERPL-MCNC: 221 UG/DL — SIGNIFICANT CHANGE UP (ref 110–370)
VIT B12 SERPL-MCNC: 1186 PG/ML — SIGNIFICANT CHANGE UP (ref 232–1245)
VIT B12 SERPL-MCNC: 1186 PG/ML — SIGNIFICANT CHANGE UP (ref 232–1245)
WBC # BLD: 20.48 K/UL — HIGH (ref 3.8–10.5)
WBC # BLD: 20.48 K/UL — HIGH (ref 3.8–10.5)
WBC # FLD AUTO: 20.48 K/UL — HIGH (ref 3.8–10.5)
WBC # FLD AUTO: 20.48 K/UL — HIGH (ref 3.8–10.5)

## 2023-11-18 PROCEDURE — 99232 SBSQ HOSP IP/OBS MODERATE 35: CPT

## 2023-11-18 RX ORDER — POTASSIUM CHLORIDE 20 MEQ
40 PACKET (EA) ORAL EVERY 4 HOURS
Refills: 0 | Status: COMPLETED | OUTPATIENT
Start: 2023-11-18 | End: 2023-11-18

## 2023-11-18 RX ORDER — CEFEPIME 1 G/1
1000 INJECTION, POWDER, FOR SOLUTION INTRAMUSCULAR; INTRAVENOUS EVERY 8 HOURS
Refills: 0 | Status: DISCONTINUED | OUTPATIENT
Start: 2023-11-18 | End: 2023-11-18

## 2023-11-18 RX ORDER — AZITHROMYCIN 500 MG/1
500 TABLET, FILM COATED ORAL DAILY
Refills: 0 | Status: DISCONTINUED | OUTPATIENT
Start: 2023-11-18 | End: 2023-11-18

## 2023-11-18 RX ORDER — IRON SUCROSE 20 MG/ML
200 INJECTION, SOLUTION INTRAVENOUS
Refills: 0 | Status: COMPLETED | OUTPATIENT
Start: 2023-11-18 | End: 2023-11-20

## 2023-11-18 RX ORDER — ACETAMINOPHEN 500 MG
675 TABLET ORAL ONCE
Refills: 0 | Status: DISCONTINUED | OUTPATIENT
Start: 2023-11-18 | End: 2023-11-30

## 2023-11-18 RX ORDER — ONDANSETRON 8 MG/1
4 TABLET, FILM COATED ORAL EVERY 6 HOURS
Refills: 0 | Status: DISCONTINUED | OUTPATIENT
Start: 2023-11-18 | End: 2023-11-30

## 2023-11-18 RX ORDER — MORPHINE SULFATE 50 MG/1
2 CAPSULE, EXTENDED RELEASE ORAL EVERY 8 HOURS
Refills: 0 | Status: DISCONTINUED | OUTPATIENT
Start: 2023-11-18 | End: 2023-11-18

## 2023-11-18 RX ORDER — CEFEPIME 1 G/1
2000 INJECTION, POWDER, FOR SOLUTION INTRAMUSCULAR; INTRAVENOUS EVERY 8 HOURS
Refills: 0 | Status: DISCONTINUED | OUTPATIENT
Start: 2023-11-18 | End: 2023-11-28

## 2023-11-18 RX ORDER — SODIUM CHLORIDE 9 MG/ML
500 INJECTION INTRAMUSCULAR; INTRAVENOUS; SUBCUTANEOUS ONCE
Refills: 0 | Status: COMPLETED | OUTPATIENT
Start: 2023-11-18 | End: 2023-11-18

## 2023-11-18 RX ADMIN — Medication 40 MILLIEQUIVALENT(S): at 10:00

## 2023-11-18 RX ADMIN — Medication 40 MILLIEQUIVALENT(S): at 15:04

## 2023-11-18 RX ADMIN — CEFEPIME 2000 MILLIGRAM(S): 1 INJECTION, POWDER, FOR SOLUTION INTRAMUSCULAR; INTRAVENOUS at 15:10

## 2023-11-18 RX ADMIN — Medication 600 MILLIGRAM(S): at 22:02

## 2023-11-18 RX ADMIN — ENOXAPARIN SODIUM 40 MILLIGRAM(S): 100 INJECTION SUBCUTANEOUS at 18:55

## 2023-11-18 RX ADMIN — Medication 1 MILLIGRAM(S): at 09:59

## 2023-11-18 RX ADMIN — CEFEPIME 2000 MILLIGRAM(S): 1 INJECTION, POWDER, FOR SOLUTION INTRAMUSCULAR; INTRAVENOUS at 22:03

## 2023-11-18 RX ADMIN — ALBUTEROL 2 PUFF(S): 90 AEROSOL, METERED ORAL at 15:34

## 2023-11-18 RX ADMIN — SODIUM CHLORIDE 250 MILLILITER(S): 9 INJECTION INTRAMUSCULAR; INTRAVENOUS; SUBCUTANEOUS at 17:58

## 2023-11-18 RX ADMIN — Medication 100 MILLIGRAM(S): at 22:02

## 2023-11-18 RX ADMIN — ALBUTEROL 2 PUFF(S): 90 AEROSOL, METERED ORAL at 02:07

## 2023-11-18 RX ADMIN — Medication 600 MILLIGRAM(S): at 10:00

## 2023-11-18 RX ADMIN — IRON SUCROSE 110 MILLIGRAM(S): 20 INJECTION, SOLUTION INTRAVENOUS at 18:55

## 2023-11-18 RX ADMIN — Medication 10 MILLILITER(S): at 03:51

## 2023-11-18 RX ADMIN — ONDANSETRON 4 MILLIGRAM(S): 8 TABLET, FILM COATED ORAL at 18:10

## 2023-11-18 NOTE — CONSULT NOTE ADULT - ASSESSMENT
33y Female with no significant PMH presented in ED with c/o worsening left chest pain since yesterday.  Her chest pain is mainly located in left upper chest.  She also has on and off fever, cough and chills for last couple weeks. Her appetite is also low, and has lost about 4-5 kg of body weight in last 5-6 months.   She denies any night sweats or diaphoresis. As per her , she has some lung problems going on for last few years while she was in [place]. [place].  She denies any personal or family history of tuberculosis.     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  Follow cultures/ Sputum AFB-may need bronch for deeper cultures  Levaquin IV daily  Albuterol prn  Robitussin prn  TSH level  DVT prophylaxis: Lovenox sq daily 33y Female with no significant PMH presented in ED with c/o worsening left chest pain since yesterday.  Her chest pain is mainly located in left upper chest.  She also has on and off fever, cough and chills for last couple weeks. Her appetite is also low, and has lost about 4-5 kg of body weight in last 5-6 months.   She denies any night sweats or diaphoresis. As per her , she has some lung problems going on for last few years while she was in [place]. [place].  She denies any personal or family history of tuberculosis.     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  Follow cultures/ Sputum AFB-may need bronch for deeper cultures  Levaquin IV daily  Albuterol prn  Robitussin prn  TSH level  spoke to pat &  in [place]  DVT prophylaxis: Lovenox sq daily

## 2023-11-18 NOTE — CONSULT NOTE ADULT - SUBJECTIVE AND OBJECTIVE BOX
Patient is a 33y old  Female who presents with a chief complaint of CHARLY pneumonia and B/L bronchiectasis (2023 19:53)      33y Female with no significant PMH presented in ED with c/o worsening left chest pain since day prior to admit. Her chest pain is mainly located in left upper chest.  She also has on and off fever, cough and chills for last couple weeks. Her appetite is also low, and has lost about 4-5 kg of body weight in last 5-6 months.   She denies any night sweats or diaphoresis. As per her , she has some lung problems going on for last few years while she was in [place]. [place].  She denies any personal or family history of tuberculosis.  Upon arrival in ED, she was tachycardic with HR in 140s but afebrile. She is saturating well on RA.  Sig labs: WBC 22.12k, N 86%, Lactate 1.8,  D-dimer 480,  Hb11.6, MCV 74, RDW 16, . Lipase 14 and Mg 2.1. CXR CHARLY consolidation. CT angio chest: Negative for PE, but shows CHARLY pneumonia and B/L extensive bronchiectasis. EK bpm in sinus tachycardia. NS 2 L bolus and Levaquin 740 mg IV given in ED.        PMH: as above  PSH: as above  Meds: per reconciliation sheet, noted below  MEDICATIONS  (STANDING):  cefepime  Injectable. 2000 milliGRAM(s) IV Push every 8 hours  enoxaparin Injectable 40 milliGRAM(s) SubCutaneous every 24 hours  folic acid 1 milliGRAM(s) Oral daily  potassium chloride   Powder 40 milliEquivalent(s) Oral every 4 hours  sodium chloride 0.9%. 1000 milliLiter(s) (100 mL/Hr) IV Continuous <Continuous>      Allergies    No Known Allergies    Intolerances      Social: no smoking, no alcohol, no illegal drugs; no recent travel, no exposure to TB  FAMILY HISTORY:  No pertinent family history       no history of premature cardiovascular disease in first degree relatives    ROS: the patient denies fever, no chills, no HA, no dizziness, no sore throat, no blurry vision, no CP, no palpitations, + SOB, + cough, no abdominal pain, no diarrhea, no N/V, no dysuria, no leg pain, no claudication, no rash, no joint aches, no rectal pain or bleeding, no night sweats    All other systems reviewed and are negative    Vital Signs Last 24 Hrs  T(C): 37.2 (2023 07:37), Max: 37.2 (2023 07:37)  T(F): 99 (2023 07:37), Max: 99 (2023 07:37)  HR: 112 (:37) (105 - 151)  BP: 91/57 (2023 07:37) (91/57 - 109/69)  BP(mean): 74 (2023 02:04) (73 - 88)  RR: 17 (:37) (17 - 18)  SpO2: 95% (2023 07:37) (95% - 100%)    Parameters below as of 2023 07:37  Patient On (Oxygen Delivery Method): room air      Daily     Daily     PE:  Constitutional: NAD   HEENT: NC/AT, EOMI, PERRLA, conjunctivae clear; ears and nose atraumatic; pharynx benign  Neck: supple; thyroid not palpable  Back: no tenderness  Respiratory: decreased breath sounds, rhonchi   Cardiovascular: S1S2 regular, no murmurs  Abdomen: soft, not tender, not distended, positive BS; liver and spleen WNL  Genitourinary: no suprapubic tenderness  Lymphatic: no LN palpable  Musculoskeletal: no muscle tenderness, no joint swelling or tenderness  Extremities: no pedal edema  Neurological/ Psychiatric: AxOx3, Judgement and insight normal;  moving all extremities  Skin: no rashes; no palpable lesions    Labs: all available labs reviewed                        10.0   20.48 )-----------( 295      ( 2023 08:03 )             30.4     11-18    138  |  109<H>  |  5<L>  ----------------------------<  114<H>  3.4<L>   |  20<L>  |  0.58    Ca    8.4<L>      2023 08:03  Mg     2.1     11-17    TPro  7.9  /  Alb  3.0<L>  /  TBili  0.6  /  DBili  x   /  AST  15  /  ALT  17  /  AlkPhos  105  11-17     LIVER FUNCTIONS - ( 2023 15:26 )  Alb: 3.0 g/dL / Pro: 7.9 gm/dL / ALK PHOS: 105 U/L / ALT: 17 U/L / AST: 15 U/L / GGT: x           Urinalysis Basic - ( 2023 08:03 )    Color: x / Appearance: x / SG: x / pH: x  Gluc: 114 mg/dL / Ketone: x  / Bili: x / Urobili: x   Blood: x / Protein: x / Nitrite: x   Leuk Esterase: x / RBC: x / WBC x   Sq Epi: x / Non Sq Epi: x / Bacteria: x          Radiology: all available radiological tests reviewed  < from: CT Angio Chest PE Protocol w/ IV Cont (23 @ 17:19) >    ACC: [id number] EXAM:  CT ANGIO CHEST PULM [facility]   ORDERED BY: ROSY OWENS     PROCEDURE DATE:  2023          INTERPRETATION:  Clinical information: Evaluate for pulmonary embolus.   Exam is compared to previous study of 2023.    CT angiogram of the chest was obtained following administration of   intravenous contrast. Approximately 70 cc of Omnipaque 350 was   administered and 30 cc was discarded. Coronal, sagittal and MIP images   were submitted for review.    Few lymph nodes are present in the subcarinal space, AP window and in the   hilar regions bilaterally.    Heart is normal in size. No pericardial effusion is noted. Pulmonary   arteries are normal in caliber. No filling defects are noted.    No endobronchial lesions are noted. Extensive bronchiectasis is noted   within both lungs. Some of the dilated bronchi are filled with   mucus/secretions. Fairly large consolidation is noted in the left upper   lobe. It is new when compared to previous exam. No pleural effusions are   noted.    Below the diaphragm, visualized portions of the abdomen are unremarkable.    Visualized osseous structures are within normal limits.    IMPRESSION: No pulmonary embolus is noted.    Findings suggestive of left upper lobe pneumonia.    Extensive bronchiectasis within both lungs.        Advanced directives addressed: full resuscitation

## 2023-11-18 NOTE — CONSULT NOTE ADULT - ASSESSMENT
33y Female with no significant PMH presented in ED with c/o worsening left chest pain since day prior to admit. Her chest pain is mainly located in left upper chest.  She also has on and off fever, cough and chills for last couple weeks. Her appetite is also low, and has lost about 4-5 kg of body weight in last 5-6 months.   She denies any night sweats or diaphoresis. As per her , she has some lung problems going on for last few years while she was in [place]. [place].  She denies any personal or family history of tuberculosis.  Upon arrival in ED, she was tachycardic with HR in 140s but afebrile. She is saturating well on RA.  Sig labs: WBC 22.12k, N 86%, Lactate 1.8,  D-dimer 480,  Hb11.6, MCV 74, RDW 16, . Lipase 14 and Mg 2.1. CXR CHARLY consolidation. CT angio chest: Negative for PE, but shows CHARLY pneumonia and B/L extensive bronchiectasis. EK bpm in sinus tachycardia. NS 2 L bolus and Levaquin 740 mg IV given in ED.    1. Fever. CHARLY pneumonia. Extensive bilateral bronchiectasis. Leukocytosis  - imaging reviewed  - pulmonary eval  - agree with IV cefepime 2gmq8h  - add azithromycin 500mg daily  - continue with antibiotic coverage   - f/u cultures, sputum cx, legionella ag,  - monitor temps  - tolerating abx well so far; no side effects noted  - reason for abx use and side effects reviewed with patient  - supportive care  - fu cbc    2. other issues - care per medicine  33y Female with no significant PMH presented in ED with c/o worsening left chest pain since day prior to admit. Her chest pain is mainly located in left upper chest.  She also has on and off fever, cough and chills for last couple weeks. Her appetite is also low, and has lost about 4-5 kg of body weight in last 5-6 months.   She denies any night sweats or diaphoresis. As per her , she has some lung problems going on for last few years while she was in [place]. [place] recently [place] to [place].  She denies any personal or family history of tuberculosis.  Upon arrival in ED, she was tachycardic with HR in 140s but afebrile. She is saturating well on RA.  Sig labs: WBC 22.12k, N 86%, Lactate 1.8,  D-dimer 480,  Hb11.6, MCV 74, RDW 16, . Lipase 14 and Mg 2.1. CXR CHARLY consolidation. CT angio chest: Negative for PE, but shows CHARLY pneumonia and B/L extensive bronchiectasis. EK bpm in sinus tachycardia. NS 2 L bolus and Levaquin 740 mg IV given in ED.    1. Fever. CHARLY pneumonia. Extensive bilateral bronchiectasis. Leukocytosis  - imaging reviewed  - pulmonary eval  - concern for possible MTB with clinical presentation and radiological findings - airborne precautions and check sputum afb x 3  - check for HIV will order  - agree with IV cefepime 2gmq8h  - add doxycycline 100mg BID  - continue with antibiotic coverage   - f/u cultures, sputum cx, legionella ag,  - was having work up prior to arrival for cystic fibrosis per pts friend - has been sick and had previous pulmonary illness as child as well   - may require bronchoscopy pending above studies  - monitor temps  - tolerating abx well so far; no side effects noted  - reason for abx use and side effects reviewed with patient  - supportive care  - fu cbc    2. other issues - care per medicine

## 2023-11-18 NOTE — PROGRESS NOTE ADULT - SUBJECTIVE AND OBJECTIVE BOX
CC: The patient is a 33y Female with no significant PMH presented in ED with c/o worsening left chest pain since yesterday.  Her chest pain is mainly located in left upper chest.  She also has on and off fever, cough and chills for last couple weeks. Her appetite is also low, and has lost about 4-5 kg of body weight in last 5-6 months.   She denies any night sweats or diaphoresis. As per her , she has some lung problems going on for last few years while she was in [place]. [place] recently come to [place] about 6 months ago.  She denies any personal or family history of tuberculosis.     11/18 - mild left sided chest pain, shortness of breath, able to lie flat in bed     Vital Signs Last 24 Hrs  T(F): 99 (18 Nov 2023 07:37), Max: 99 (18 Nov 2023 07:37)  HR: 112 (18 Nov 2023 07:37) (105 - 151)  BP: 91/57 (18 Nov 2023 07:37) (91/57 - 109/69)  RR: 17 (18 Nov 2023 07:37) (17 - 18)  SpO2: 95% (18 Nov 2023 07:37) (95% - 100%)/RA   Constitutional: NAD, awake and alert  HEENT: PERR, EOMI  Neck: Soft and supple,  No JVD  Respiratory: Breath sounds are clear bilaterally, No wheezing, rales or rhonchi  Cardiovascular: S1 and S2, regular rate and rhythm, no Murmurs  Gastrointestinal: Bowel Sounds present, soft, nontender, nondistended  Extremities: No peripheral edema  Vascular: 2+ peripheral pulses  Neurological: A/O x 3, no focal deficits  Musculoskeletal: 5/5 strength b/l upper and lower extremities  Skin: No rashes    MEDICATIONS:  MEDICATIONS  (STANDING):  cefepime  Injectable. 2000 milliGRAM(s) IV Push every 8 hours  doxycycline monohydrate Capsule 100 milliGRAM(s) Oral every 12 hours  enoxaparin Injectable 40 milliGRAM(s) SubCutaneous every 24 hours  folic acid 1 milliGRAM(s) Oral daily  potassium chloride   Powder 40 milliEquivalent(s) Oral every 4 hours      LABS: All Labs Reviewed:                      10.0   20.48 )-----------( 295      ( 18 Nov 2023 08:03 )             30.4   138  |  109<H>  |  5<L>  ----------------------------<  114<H>  3.4<L>   |  20<L>  |  0.58  Ca    8.4<L>      18 Nov 2023 08:03  Mg     2.1     11-17  TPro  7.9  /  Alb  3.0<L>  /  TBili  0.6  /  DBili  x   /  AST  15  /  ALT  17  /  AlkPhos  105  11-17      Blood Culture: 11-18 @ 00:30  Organism --  Gram Stain Blood -- Gram Stain   Moderate polymorphonuclear leukocytes per low power field  Few Squamous epithelial cells per low power field  Moderate Gram Positive Cocci in Pairs and Chains seen per oil power field  Few Gram Negative Rods seen per oil power field  Few Gram Positive Rods seen per oil power field  Specimen Source .Sputum Sputum  Culture-Blood --        RADIOLOGY/EKG:    DVT PPX:    ADVANCED DIRECTIVE:    DISPOSITION:

## 2023-11-18 NOTE — PROGRESS NOTE ADULT - ASSESSMENT
# Community acquired pneumonia of CHARLY.  # B/L extensive bronchiectasis.  CT angio chest: Negative for PE, but shows CHARLY pneumonia and B/L extensive bronchiectasis.  Change to CEFEPIME DOXY,   Albuterol prn.  Pt sees Dr Jenn Guidry [facility]   Pt reports freq pneumonias since her childhood   arrived from [place] 4 mos ago   never had a bronch, discussed with ID - place in isolation, check afb sputum     # Sinus tachycardia likely due to pneumonia.  EK bpm in sinus tachycardia.  Will get TSH level.    # Hypochromic microcytic  anemia.  Hb11.6, MCV 74, RDW 16.  Will order Iron panel, Ferritin, B12 and Folate levels.  Start IV Venofer     # Acute hyperglycemia, likely reactive.  .  Will get A1c level.    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    POC discussed with pt and her  at bedside

## 2023-11-18 NOTE — CONSULT NOTE ADULT - SUBJECTIVE AND OBJECTIVE BOX
HPI:    33y Female with no significant PMH presented in ED with c/o worsening left chest pain since yesterday.  Her chest pain is mainly located in left upper chest.  She also has on and off fever, cough and chills for last couple weeks. Her appetite is also low, and has lost about 4-5 kg of body weight in last 5-6 months.   She denies any night sweats or diaphoresis. As per her , she has some lung problems going on for last few years while she was in [place]. [place] recently come to [place] about [place] ago.  She denies any personal or family history of tuberculosis. In upon arrival in ED, she was tachycardic with HR in 140s but afebrile. She is saturating well on RA. Sig labs: WBC 22.12k, N 86%, Lactate 1.8,  D-dimer 480,  Hb11.6, MCV 74, RDW 16, . Lipase 14 and Mg 2.1. CXR CHARLY consolidation. CT angio chest: Negative for PE, but shows CHARLY pneumonia and B/L extensive bronchiectasis.  EK bpm in sinus tachycardia. NS 2 L bolus and Levaquin 740 mg IV given in ED. pat seen for pulmonary eval.        PAST MEDICAL & SURGICAL HISTORY:  History of lung disease      Pneumonia      No pertinent past surgical history          Home Medications:  Albuterol (Eqv-ProAir HFA) 90 mcg/inh inhalation aerosol: 2 puff(s) inhaled every 6 hours (2023 20:15)  albuterol 2.5 mg/3 mL (0.083%) inhalation solution: 3 milliliter(s) by nebulizer 4 times a day (2023 20:15)  folic acid 1 mg oral tablet: 1 tab(s) orally once a day (2023 20:15)  naproxen 500 mg oral tablet: 1 tab(s) orally once a day as needed for (2023 20:15)      MEDICATIONS  (STANDING):  cefepime  Injectable. 2000 milliGRAM(s) IV Push every 8 hours  doxycycline monohydrate Capsule 100 milliGRAM(s) Oral every 12 hours  enoxaparin Injectable 40 milliGRAM(s) SubCutaneous every 24 hours  folic acid 1 milliGRAM(s) Oral daily  potassium chloride   Powder 40 milliEquivalent(s) Oral every 4 hours    MEDICATIONS  (PRN):  acetaminophen     Tablet .. 650 milliGRAM(s) Oral every 6 hours PRN Temp greater or equal to 38C (100.4F), Mild Pain (1 - 3)  albuterol    90 MICROgram(s) HFA Inhaler 2 Puff(s) Inhalation every 6 hours PRN Shortness of Breath and/or Wheezing  aluminum hydroxide/magnesium hydroxide/simethicone Suspension 30 milliLiter(s) Oral every 4 hours PRN Dyspepsia  guaiFENesin  milliGRAM(s) Oral every 12 hours PRN Cough  melatonin 3 milliGRAM(s) Oral at bedtime PRN Insomnia      Allergies    No Known Allergies    Intolerances        SOCIAL HISTORY: Denies tobacco, etoh abuse or illicit drug use    FAMILY HISTORY:  No pertinent family history        Vital Signs Last 24 Hrs  T(C): 37.2 (2023 07:37), Max: 37.2 (2023 07:37)  T(F): 99 (2023 07:37), Max: 99 (2023 07:37)  HR: 112 (2023 07:37) (105 - 151)  BP: 91/57 (2023 07:37) (91/57 - 109/69)  BP(mean): 74 (2023 02:04) (73 - 88)  RR: 17 (2023 07:37) (17 - 18)  SpO2: 95% (2023 07:37) (95% - 100%)    Parameters below as of 2023 07:37  Patient On (Oxygen Delivery Method): room air            REVIEW OF SYSTEMS:    CONSTITUTIONAL:  As per HPI.  HEENT:  Eyes:  No diplopia or blurred vision. ENT:  No earache, sore throat or runny nose.  CARDIOVASCULAR:  No pressure, squeezing, tightness, heaviness or aching about the chest, neck, axilla or epigastrium.  RESPIRATORY:  No cough, shortness of breath, PND or orthopnea.  GASTROINTESTINAL:  No nausea, vomiting or diarrhea.  GENITOURINARY:  No dysuria, frequency or urgency.  MUSCULOSKELETAL:  As per HPI.  SKIN:  No change in skin, hair or nails.  NEUROLOGIC:  No paresthesias, fasciculations, seizures or weakness.  PSYCHIATRIC:  No disorder of thought or mood.  ENDOCRINE:  No heat or cold intolerance, polyuria or polydipsia.  HEMATOLOGICAL:  No easy bruising or bleedings:  .     PHYSICAL EXAMINATION:    GENERAL APPEARANCE:  Pt. is not currently dyspneic, in no distress. Pt. is alert, oriented, and pleasant.  HEENT:  Pupils are normal and react normally. No icterus. Mucous membranes well colored.  NECK:  Supple. No lymphadenopathy. Jugular venous pressure not elevated. Carotids equal.   HEART:   The cardiac impulse has a normal quality. Regular. Normal S1 and S2. There are no murmurs, rubs or gallops noted  CHEST:  Chest is clear to auscultation. Normal respiratory effort.  ABDOMEN:  Soft and nontender.   EXTREMITIES:  There is no cyanosis, clubbing or edema.   SKIN:  No rash or significant lesions are noted.    LABS:                        10.0   20.48 )-----------( 295      ( 2023 08:03 )             30.4         138  |  109<H>  |  5<L>  ----------------------------<  114<H>  3.4<L>   |  20<L>  |  0.58    Ca    8.4<L>      2023 08:03  Mg     2.1         TPro  7.9  /  Alb  3.0<L>  /  TBili  0.6  /  DBili  x   /  AST  15  /  ALT  17  /  AlkPhos  105  1117    LIVER FUNCTIONS - ( 2023 15:26 )  Alb: 3.0 g/dL / Pro: 7.9 gm/dL / ALK PHOS: 105 U/L / ALT: 17 U/L / AST: 15 U/L / GGT: x                 Urinalysis Basic - ( 2023 08:03 )    Color: x / Appearance: x / SG: x / pH: x  Gluc: 114 mg/dL / Ketone: x  / Bili: x / Urobili: x   Blood: x / Protein: x / Nitrite: x   Leuk Esterase: x / RBC: x / WBC x   Sq Epi: x / Non Sq Epi: x / Bacteria: x          Culture - Sputum (collected 23 @ 00:30)  Source: .Sputum Sputum  Gram Stain (23 @ 13:46):    Moderate polymorphonuclear leukocytes per low power field    Few Squamous epithelial cells per low power field    Moderate Gram Positive Cocci in Pairs and Chains seen per oil power field    Few Gram Negative Rods seen per oil power field    Few Gram Positive Rods seen per oil power field        RADIOLOGY & ADDITIONAL STUDIES:     CT Angio Chest PE Protocol w/ IV Cont (23 @ 17:19) >  IMPRESSION: No pulmonary embolus is noted.    Findings suggestive of left upper lobe pneumonia.    Extensive bronchiectasis within both lungs.       HPI:    33y Female with no significant PMH presented in ED with c/o worsening left chest pain since yesterday.  Her chest pain is mainly located in left upper chest.  She also has on and off fever, cough and chills for last couple weeks. Her appetite is also low, and has lost about 4-5 kg of body weight in last 5-6 months.   She denies any night sweats or diaphoresis. As per her , she has some lung problems going on for last few years while she was in [place]. [place] recently come to [place] about 6 [place] ago.  She denies any personal or family history of tuberculosis. In upon arrival in ED, she was tachycardic with HR in 140s but afebrile. She is saturating well on RA. Sig labs: WBC 22.12k, N 86%, Lactate 1.8,  D-dimer 480,  Hb11.6, MCV 74, RDW 16, . Lipase 14 and Mg 2.1. CXR CHARLY consolidation. CT angio chest: Negative for PE, but shows CHARLY pneumonia and B/L extensive bronchiectasis. EK bpm in sinus tachycardia. NS 2 L bolus and Levaquin 740 mg IV given in ED. pat seen for pulmonary eval. pat sitting in bed,  @bedside, came from [place] one year ago, isolated to r/o TB.      PAST MEDICAL & SURGICAL HISTORY:  History of lung disease      Pneumonia      No pertinent past surgical history          Home Medications:  Albuterol (Eqv-ProAir HFA) 90 mcg/inh inhalation aerosol: 2 puff(s) inhaled every 6 hours (2023 20:15)  albuterol 2.5 mg/3 mL (0.083%) inhalation solution: 3 milliliter(s) by nebulizer 4 times a day (2023 20:15)  folic acid 1 mg oral tablet: 1 tab(s) orally once a day (2023 20:15)  naproxen 500 mg oral tablet: 1 tab(s) orally once a day as needed for (2023 20:15)      MEDICATIONS  (STANDING):  cefepime  Injectable. 2000 milliGRAM(s) IV Push every 8 hours  doxycycline monohydrate Capsule 100 milliGRAM(s) Oral every 12 hours  enoxaparin Injectable 40 milliGRAM(s) SubCutaneous every 24 hours  folic acid 1 milliGRAM(s) Oral daily  potassium chloride   Powder 40 milliEquivalent(s) Oral every 4 hours    MEDICATIONS  (PRN):  acetaminophen     Tablet .. 650 milliGRAM(s) Oral every 6 hours PRN Temp greater or equal to 38C (100.4F), Mild Pain (1 - 3)  albuterol    90 MICROgram(s) HFA Inhaler 2 Puff(s) Inhalation every 6 hours PRN Shortness of Breath and/or Wheezing  aluminum hydroxide/magnesium hydroxide/simethicone Suspension 30 milliLiter(s) Oral every 4 hours PRN Dyspepsia  guaiFENesin  milliGRAM(s) Oral every 12 hours PRN Cough  melatonin 3 milliGRAM(s) Oral at bedtime PRN Insomnia      Allergies    No Known Allergies    Intolerances        SOCIAL HISTORY: Denies tobacco, etoh abuse or illicit drug use    FAMILY HISTORY:  No pertinent family history        Vital Signs Last 24 Hrs  T(C): 37.2 (2023 07:37), Max: 37.2 (2023 07:37)  T(F): 99 (2023 07:37), Max: 99 (2023 07:37)  HR: 112 (2023 07:37) (105 - 151)  BP: 91/57 (2023 07:37) (91/57 - 109/69)  BP(mean): 74 (2023 02:04) (73 - 88)  RR: 17 (2023 07:37) (17 - 18)  SpO2: 95% (2023 07:37) (95% - 100%)    Parameters below as of 2023 07:37  Patient On (Oxygen Delivery Method): room air            REVIEW OF SYSTEMS:    CONSTITUTIONAL:  As per HPI.  HEENT:  Eyes:  No diplopia or blurred vision. ENT:  No earache, sore throat or runny nose.  CARDIOVASCULAR:  No pressure, squeezing, tightness, heaviness or aching about the chest, neck, axilla or epigastrium.  RESPIRATORY:  No cough, +shortness of breath, PND or orthopnea.  GASTROINTESTINAL:  No nausea, vomiting or diarrhea.  GENITOURINARY:  No dysuria, frequency or urgency.  MUSCULOSKELETAL:  As per HPI.  SKIN:  No change in skin, hair or nails.  NEUROLOGIC:  No paresthesias, fasciculations, seizures or weakness.  PSYCHIATRIC:  No disorder of thought or mood.  ENDOCRINE:  No heat or cold intolerance, polyuria or polydipsia.  HEMATOLOGICAL:  No easy bruising or bleedings:  .     PHYSICAL EXAMINATION:    GENERAL APPEARANCE:  Pt. is not currently dyspneic, in no distress. Pt. is alert, oriented, and pleasant.  HEENT:  Pupils are normal and react normally. No icterus. Mucous membranes well colored.  NECK:  Supple. No lymphadenopathy. Jugular venous pressure not elevated. Carotids equal.   HEART:   The cardiac impulse has a normal quality. Regular. Normal S1 and S2. There are no murmurs, rubs or gallops noted  CHEST:  Chest crackles to auscultation. Normal respiratory effort.  ABDOMEN:  Soft and nontender.   EXTREMITIES:  There is no cyanosis, clubbing or edema.   SKIN:  No rash or significant lesions are noted.    LABS:                        10.0   20.48 )-----------( 295      ( 2023 08:03 )             30.4         138  |  109<H>  |  5<L>  ----------------------------<  114<H>  3.4<L>   |  20<L>  |  0.58    Ca    8.4<L>      2023 08:03  Mg     2.1         TPro  7.9  /  Alb  3.0<L>  /  TBili  0.6  /  DBili  x   /  AST  15  /  ALT  17  /  AlkPhos  105      LIVER FUNCTIONS - ( 2023 15:26 )  Alb: 3.0 g/dL / Pro: 7.9 gm/dL / ALK PHOS: 105 U/L / ALT: 17 U/L / AST: 15 U/L / GGT: x                 Urinalysis Basic - ( 2023 08:03 )    Color: x / Appearance: x / SG: x / pH: x  Gluc: 114 mg/dL / Ketone: x  / Bili: x / Urobili: x   Blood: x / Protein: x / Nitrite: x   Leuk Esterase: x / RBC: x / WBC x   Sq Epi: x / Non Sq Epi: x / Bacteria: x          Culture - Sputum (collected 23 @ 00:30)  Source: .Sputum Sputum  Gram Stain (23 @ 13:46):    Moderate polymorphonuclear leukocytes per low power field    Few Squamous epithelial cells per low power field    Moderate Gram Positive Cocci in Pairs and Chains seen per oil power field    Few Gram Negative Rods seen per oil power field    Few Gram Positive Rods seen per oil power field        RADIOLOGY & ADDITIONAL STUDIES:     CT Angio Chest PE Protocol w/ IV Cont (23 @ 17:19) >  IMPRESSION: No pulmonary embolus is noted.    Findings suggestive of left upper lobe pneumonia.    Extensive bronchiectasis within both lungs.

## 2023-11-19 LAB
NIGHT BLUE STAIN TISS: SIGNIFICANT CHANGE UP
NIGHT BLUE STAIN TISS: SIGNIFICANT CHANGE UP
SPECIMEN SOURCE: SIGNIFICANT CHANGE UP
SPECIMEN SOURCE: SIGNIFICANT CHANGE UP

## 2023-11-19 PROCEDURE — 99232 SBSQ HOSP IP/OBS MODERATE 35: CPT

## 2023-11-19 RX ORDER — SODIUM CHLORIDE 9 MG/ML
1000 INJECTION INTRAMUSCULAR; INTRAVENOUS; SUBCUTANEOUS
Refills: 0 | Status: DISCONTINUED | OUTPATIENT
Start: 2023-11-19 | End: 2023-11-23

## 2023-11-19 RX ORDER — ALBUMIN HUMAN 25 %
50 VIAL (ML) INTRAVENOUS EVERY 8 HOURS
Refills: 0 | Status: COMPLETED | OUTPATIENT
Start: 2023-11-19 | End: 2023-11-19

## 2023-11-19 RX ADMIN — Medication 1 MILLIGRAM(S): at 11:13

## 2023-11-19 RX ADMIN — Medication 3 MILLIGRAM(S): at 21:24

## 2023-11-19 RX ADMIN — Medication 100 MILLIGRAM(S): at 21:23

## 2023-11-19 RX ADMIN — CEFEPIME 2000 MILLIGRAM(S): 1 INJECTION, POWDER, FOR SOLUTION INTRAMUSCULAR; INTRAVENOUS at 21:23

## 2023-11-19 RX ADMIN — Medication 100 MILLIGRAM(S): at 11:13

## 2023-11-19 RX ADMIN — ALBUTEROL 2 PUFF(S): 90 AEROSOL, METERED ORAL at 19:00

## 2023-11-19 RX ADMIN — IRON SUCROSE 110 MILLIGRAM(S): 20 INJECTION, SOLUTION INTRAVENOUS at 19:00

## 2023-11-19 RX ADMIN — Medication 50 MILLILITER(S): at 23:39

## 2023-11-19 RX ADMIN — ENOXAPARIN SODIUM 40 MILLIGRAM(S): 100 INJECTION SUBCUTANEOUS at 19:00

## 2023-11-19 RX ADMIN — Medication 50 MILLILITER(S): at 11:03

## 2023-11-19 RX ADMIN — Medication 50 MILLILITER(S): at 13:41

## 2023-11-19 RX ADMIN — CEFEPIME 2000 MILLIGRAM(S): 1 INJECTION, POWDER, FOR SOLUTION INTRAMUSCULAR; INTRAVENOUS at 13:51

## 2023-11-19 RX ADMIN — CEFEPIME 2000 MILLIGRAM(S): 1 INJECTION, POWDER, FOR SOLUTION INTRAMUSCULAR; INTRAVENOUS at 05:51

## 2023-11-19 NOTE — PROGRESS NOTE ADULT - SUBJECTIVE AND OBJECTIVE BOX
Date of service: 11-19-23 @ 14:42    pt seen and examined  feels better   no resp distress  less cough    ROS: no fever or chills; denies dizziness, no HA, no abdominal pain, no diarrhea or constipation; no dysuria, no urinary frequency, no legs pain, no rashes    MEDICATIONS  (STANDING):  acetaminophen   IVPB .. 675 milliGRAM(s) IV Intermittent once  albumin human 25% IVPB 50 milliLiter(s) IV Intermittent every 8 hours  cefepime  Injectable. 2000 milliGRAM(s) IV Push every 8 hours  doxycycline monohydrate Capsule 100 milliGRAM(s) Oral every 12 hours  enoxaparin Injectable 40 milliGRAM(s) SubCutaneous every 24 hours  folic acid 1 milliGRAM(s) Oral daily  iron sucrose IVPB 200 milliGRAM(s) IV Intermittent <User Schedule>  sodium chloride 0.9%. 1000 milliLiter(s) (100 mL/Hr) IV Continuous <Continuous>      Vital Signs Last 24 Hrs  T(C): 36.7 (19 Nov 2023 07:30), Max: 37.1 (18 Nov 2023 15:47)  T(F): 98.1 (19 Nov 2023 07:30), Max: 98.8 (18 Nov 2023 15:47)  HR: 117 (19 Nov 2023 07:30) (112 - 125)  BP: 91/60 (19 Nov 2023 07:30) (91/60 - 100/-)  BP(mean): --  RR: 17 (19 Nov 2023 07:30) (17 - 18)  SpO2: 94% (19 Nov 2023 07:30) (94% - 97%)    Parameters below as of 19 Nov 2023 07:30  Patient On (Oxygen Delivery Method): room air      PE:  Constitutional: NAD   HEENT: NC/AT, EOMI, PERRLA, conjunctivae clear; ears and nose atraumatic; pharynx benign  Neck: supple; thyroid not palpable  Back: no tenderness  Respiratory: decreased breath sounds, rhonchi   Cardiovascular: S1S2 regular, no murmurs  Abdomen: soft, not tender, not distended, positive BS; liver and spleen WNL  Genitourinary: no suprapubic tenderness  Lymphatic: no LN palpable  Musculoskeletal: no muscle tenderness, no joint swelling or tenderness  Extremities: no pedal edema  Neurological/ Psychiatric: AxOx3, Judgement and insight normal;  moving all extremities  Skin: no rashes; no palpable lesions    Labs: all available labs reviewed                                   10.0   20.48 )-----------( 295      ( 18 Nov 2023 08:03 )             30.4     11-18    138  |  109<H>  |  5<L>  ----------------------------<  114<H>  3.4<L>   |  20<L>  |  0.58    Ca    8.4<L>      18 Nov 2023 08:03  Mg     2.1     11-17    TPro  7.9  /  Alb  3.0<L>  /  TBili  0.6  /  DBili  x   /  AST  15  /  ALT  17  /  AlkPhos  105  11-17        LIVER FUNCTIONS - ( 17 Nov 2023 15:26 )  Alb: 3.0 g/dL / Pro: 7.9 gm/dL / ALK PHOS: 105 U/L / ALT: 17 U/L / AST: 15 U/L / GGT: x           Urinalysis Basic - ( 18 Nov 2023 08:03 )    Color: x / Appearance: x / SG: x / pH: x  Gluc: 114 mg/dL / Ketone: x  / Bili: x / Urobili: x   Blood: x / Protein: x / Nitrite: x   Leuk Esterase: x / RBC: x / WBC x   Sq Epi: x / Non Sq Epi: x / Bacteria: x    Culture - Sputum (11.18.23 @ 00:30)   Gram Stain:   Moderate polymorphonuclear leukocytes per low power field   Few Squamous epithelial cells per low power field   Moderate Gram Positive Cocci in Pairs and Chains seen per oil power field   Few Gram Negative Rods seen per oil power field   Few Gram Positive Rods seen per oil power field  Specimen Source: .Sputum Sputum  Culture Results:   Normal Respiratory Terra present   Culture - Blood (11.17.23 @ 17:33)   Specimen Source: .Blood None  Culture Results:   No growth at 24 hours      Radiology: all available radiological tests reviewed  < from: CT Angio Chest PE Protocol w/ IV Cont (11.17.23 @ 17:19) >    ACC: [id number] EXAM:  CT ANGIO CHEST PULM ART [facility]   ORDERED BY: ROSY OWENS     PROCEDURE DATE:  11/17/2023          INTERPRETATION:  Clinical information: Evaluate for pulmonary embolus.   Exam is compared to previous study of 8/23/2023.    CT angiogram of the chest was obtained following administration of   intravenous contrast. Approximately 70 cc of Omnipaque 350 was   administered and 30 cc was discarded. Coronal, sagittal and MIP images   were submitted for review.    Few lymph nodes are present in the subcarinal space, AP window and in the   hilar regions bilaterally.    Heart is normal in size. No pericardial effusion is noted. Pulmonary   arteries are normal in caliber. No filling defects are noted.    No endobronchial lesions are noted. Extensive bronchiectasis is noted   within both lungs. Some of the dilated bronchi are filled with   mucus/secretions. Fairly large consolidation is noted in the left upper   lobe. It is new when compared to previous exam. No pleural effusions are   noted.    Below the diaphragm, visualized portions of the abdomen are unremarkable.    Visualized osseous structures are within normal limits.    IMPRESSION: No pulmonary embolus is noted.    Findings suggestive of left upper lobe pneumonia.    Extensive bronchiectasis within both lungs.        Advanced directives addressed: full resuscitation

## 2023-11-19 NOTE — PROGRESS NOTE ADULT - ASSESSMENT
33y Female with no significant PMH presented in ED with c/o worsening left chest pain since day prior to admit. Her chest pain is mainly located in left upper chest.  She also has on and off fever, cough and chills for last couple weeks. Her appetite is also low, and has lost about 4-5 kg of body weight in last 5-6 months.   She denies any night sweats or diaphoresis. As per her , she has some lung problems going on for last few years while she was in [place]. [place] recently come to [place] about 6 [place] ago.  She denies any personal or family history of tuberculosis.  Upon arrival in ED, she was tachycardic with HR in 140s but afebrile. She is saturating well on RA.  Sig labs: WBC 22.12k, N 86%, Lactate 1.8,  D-dimer 480,  Hb11.6, MCV 74, RDW 16, . Lipase 14 and Mg 2.1. CXR CHARLY consolidation. CT angio chest: Negative for PE, but shows CHARLY pneumonia and B/L extensive bronchiectasis. EK bpm in sinus tachycardia. NS 2 L bolus and Levaquin 740 mg IV given in ED.    1. Fever. CHARLY pneumonia. Extensive bilateral bronchiectasis. Leukocytosis  - imaging reviewed  - pulmonary eval noted  - concern for possible MTB with clinical presentation and radiological findings - airborne precautions and check sputum afb x 3 1 pending, f/u 2 more  - check for HIV will order  - on IV cefepime 2gmq8h #2  - on doxycycline 100mg BID #2   - continue with antibiotic coverage   - f/u cultures, sputum cx nl mike, legionella ag,(-)  - was having work up prior to arrival for cystic fibrosis per pts friend - has been sick and had previous pulmonary illness as child as well   - may require bronchoscopy pending above studies  - monitor temps  - tolerating abx well so far; no side effects noted  - reason for abx use and side effects reviewed with patient  - supportive care  - fu cbc    2. other issues - care per medicine

## 2023-11-19 NOTE — PROGRESS NOTE ADULT - SUBJECTIVE AND OBJECTIVE BOX
Subjective:    pat better, sitting in bed, no respiratory distress.    Home Medications:  Albuterol (Eqv-ProAir HFA) 90 mcg/inh inhalation aerosol: 2 puff(s) inhaled every 6 hours (17 Nov 2023 20:15)  albuterol 2.5 mg/3 mL (0.083%) inhalation solution: 3 milliliter(s) by nebulizer 4 times a day (17 Nov 2023 20:15)  folic acid 1 mg oral tablet: 1 tab(s) orally once a day (17 Nov 2023 20:15)  naproxen 500 mg oral tablet: 1 tab(s) orally once a day as needed for (17 Nov 2023 20:15)    MEDICATIONS  (STANDING):  acetaminophen   IVPB .. 675 milliGRAM(s) IV Intermittent once  albumin human 25% IVPB 50 milliLiter(s) IV Intermittent every 8 hours  cefepime  Injectable. 2000 milliGRAM(s) IV Push every 8 hours  doxycycline monohydrate Capsule 100 milliGRAM(s) Oral every 12 hours  enoxaparin Injectable 40 milliGRAM(s) SubCutaneous every 24 hours  folic acid 1 milliGRAM(s) Oral daily  iron sucrose IVPB 200 milliGRAM(s) IV Intermittent <User Schedule>  sodium chloride 0.9%. 1000 milliLiter(s) (100 mL/Hr) IV Continuous <Continuous>    MEDICATIONS  (PRN):  acetaminophen     Tablet .. 650 milliGRAM(s) Oral every 6 hours PRN Temp greater or equal to 38C (100.4F), Mild Pain (1 - 3)  albuterol    90 MICROgram(s) HFA Inhaler 2 Puff(s) Inhalation every 6 hours PRN Shortness of Breath and/or Wheezing  aluminum hydroxide/magnesium hydroxide/simethicone Suspension 30 milliLiter(s) Oral every 4 hours PRN Dyspepsia  guaiFENesin  milliGRAM(s) Oral every 12 hours PRN Cough  melatonin 3 milliGRAM(s) Oral at bedtime PRN Insomnia  morphine  - Injectable 2 milliGRAM(s) IV Push every 8 hours PRN Moderate Pain (4 - 6)  ondansetron Injectable 4 milliGRAM(s) IV Push every 6 hours PRN Nausea and/or Vomiting      Allergies    No Known Allergies    Intolerances        Vital Signs Last 24 Hrs  T(C): 36.7 (19 Nov 2023 07:30), Max: 37.1 (18 Nov 2023 15:47)  T(F): 98.1 (19 Nov 2023 07:30), Max: 98.8 (18 Nov 2023 15:47)  HR: 117 (19 Nov 2023 07:30) (112 - 125)  BP: 91/60 (19 Nov 2023 07:30) (91/60 - 100/-)  BP(mean): --  RR: 17 (19 Nov 2023 07:30) (17 - 18)  SpO2: 94% (19 Nov 2023 07:30) (94% - 97%)    Parameters below as of 19 Nov 2023 07:30  Patient On (Oxygen Delivery Method): room air          PHYSICAL EXAMINATION:    NECK:  Supple. No lymphadenopathy. Jugular venous pressure not elevated. Carotids equal.   HEART:   The cardiac impulse has a normal quality. Reg., Nl S1 and S2.  There are no murmurs, rubs or gallops noted  CHEST:  Chest is clear to auscultation. Normal respiratory effort.  ABDOMEN:  Soft and nontender.   EXTREMITIES:  There is no edema.       LABS:                        10.0   20.48 )-----------( 295      ( 18 Nov 2023 08:03 )             30.4     11-18    138  |  109<H>  |  5<L>  ----------------------------<  114<H>  3.4<L>   |  20<L>  |  0.58    Ca    8.4<L>      18 Nov 2023 08:03  Mg     2.1     11-17    TPro  7.9  /  Alb  3.0<L>  /  TBili  0.6  /  DBili  x   /  AST  15  /  ALT  17  /  AlkPhos  105  11-17      Urinalysis Basic - ( 18 Nov 2023 08:03 )    Color: x / Appearance: x / SG: x / pH: x  Gluc: 114 mg/dL / Ketone: x  / Bili: x / Urobili: x   Blood: x / Protein: x / Nitrite: x   Leuk Esterase: x / RBC: x / WBC x   Sq Epi: x / Non Sq Epi: x / Bacteria: x

## 2023-11-19 NOTE — PROGRESS NOTE ADULT - ASSESSMENT
33y Female with no significant PMH presented in ED with c/o worsening left chest pain since yesterday.  Her chest pain is mainly located in left upper chest.  She also has on and off fever, cough and chills for last couple weeks. Her appetite is also low, and has lost about 4-5 kg of body weight in last 5-6 months.   She denies any night sweats or diaphoresis. As per her , she has some lung problems going on for last few years while she was in [place]. [place] recently come to [place] about 6 months ago.  She denies any personal or family history of tuberculosis.     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

## 2023-11-19 NOTE — PROGRESS NOTE ADULT - ASSESSMENT
# Community acquired pneumonia of CHARLY.  # B/L extensive bronchiectasis.  CT angio chest: Negative for PE, but shows CHARLY pneumonia and B/L extensive bronchiectasis.  Change to CEFEPIME DOXY,   Albuterol prn.  Pt sees Dr Jenn Guidry [facility]   Pt reports freq pneumonias since her childhood   arrived from [place] 4 mos ago   never had a bronch, discussed with ID - place in isolation, check afb sputum    - follow-up sputum AFB results   Cont IV ABX  add Hycodan   discussed with Dr Patel - suspects lymphangioleiomyomatosis   pt will need bronch on this admission once afb  results are back and if neg     # Sinus tachycardia likely due to pneumonia.  EK bpm in sinus tachycardia.  Will get TSH level.   - add IVFs     # Hypochromic microcytic  anemia.  Hb11.6, MCV 74, RDW 16.  Will order Iron panel, Ferritin, B12 and Folate levels.  Start IV Venofer    - cont IV Venofer     # Acute hyperglycemia, likely reactive.  .  Will get A1c level.    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    POC discussed with pt and Dr Patel

## 2023-11-19 NOTE — PROGRESS NOTE ADULT - SUBJECTIVE AND OBJECTIVE BOX
CC: The patient is a 33y Female with no significant PMH presented in ED with c/o worsening left chest pain since yesterday.  Her chest pain is mainly located in left upper chest.  She also has on and off fever, cough and chills for last couple weeks. Her appetite is also low, and has lost about 4-5 kg of body weight in last 5-6 months.   She denies any night sweats or diaphoresis. As per her , she has some lung problems going on for last few years while she was in [place]. [place] recently come to [place] about 6 [place] ago.  She denies any personal or family history of tuberculosis.     11/18 - mild left sided chest pain, shortness of breath, able to lie flat in bed   11/19 - no cp palps sob abdo pain, c/o cough with chest pain, is OOB     Vital Signs Last 24 Hrs  T(F): 98.1 (19 Nov 2023 07:30), Max: 98.8 (18 Nov 2023 15:47)  HR: 117 (19 Nov 2023 07:30) (112 - 125)  BP: 91/60 (19 Nov 2023 07:30) (91/60 - 100/-)  RR: 17 (19 Nov 2023 07:30) (17 - 18)  SpO2: 94% (19 Nov 2023 07:30) (94% - 97%)  Patient On (Oxygen Delivery Method): room air  Constitutional: NAD, awake and alert  HEENT: PERR, EOMI  Neck: Soft and supple,  No JVD  Respiratory: Breath sounds are clear bilaterally, No wheezing, rales or rhonchi  Cardiovascular: S1 and S2, regular rate and rhythm, no Murmurs  Gastrointestinal: Bowel Sounds present, soft, nontender, nondistended  Extremities: No peripheral edema  Vascular: 2+ peripheral pulses  Neurological: A/O x 3, no focal deficits  Musculoskeletal: 5/5 strength b/l upper and lower extremities  Skin: No rashes    Blood Culture: 11-18 @ 00:30  Organism --  Gram Stain Blood -- Gram Stain   Moderate polymorphonuclear leukocytes per low power field  Few Squamous epithelial cells per low power field  Moderate Gram Positive Cocci in Pairs and Chains seen per oil power field  Few Gram Negative Rods seen per oil power field  Few Gram Positive Rods seen per oil power field  Specimen Source .Sputum Sputum  Culture-Blood --    LABS: All Labs Reviewed:                      10.0   20.48 )-----------( 295      ( 18 Nov 2023 08:03 )             30.4   138  |  109<H>  |  5<L>  ----------------------------<  114<H>  3.4<L>   |  20<L>  |  0.58  Ca    8.4<L>      18 Nov 2023 08:03  Mg     2.1     11-17  TPro  7.9  /  Alb  3.0<L>  /  TBili  0.6  /  DBili  x   /  AST  15  /  ALT  17  /  AlkPhos  105  11-17    Culture - Acid Fast - Sputum w/Smear (collected 11-18-23 @ 17:30)  Source: .Sputum Sputum  Culture - Acid Fast - Sputum w/Smear (collected 11-18-23 @ 17:30)  Source: .Sputum Sputum    RAD:   < from: CT Angio Chest PE Protocol w/ IV Cont (11.17.23 @ 17:19) >  IMPRESSION: No pulmonary embolus is noted.  Findings suggestive of left upper lobe pneumonia.  Extensive bronchiectasis within both lungs.    MEDS:   acetaminophen     Tablet .. 650 milliGRAM(s) Oral every 6 hours PRN  acetaminophen   IVPB .. 675 milliGRAM(s) IV Intermittent once  albumin human 25% IVPB 50 milliLiter(s) IV Intermittent every 8 hours  albuterol    90 MICROgram(s) HFA Inhaler 2 Puff(s) Inhalation every 6 hours PRN  aluminum hydroxide/magnesium hydroxide/simethicone Suspension 30 milliLiter(s) Oral every 4 hours PRN  cefepime  Injectable. 2000 milliGRAM(s) IV Push every 8 hours  doxycycline monohydrate Capsule 100 milliGRAM(s) Oral every 12 hours  enoxaparin Injectable 40 milliGRAM(s) SubCutaneous every 24 hours  folic acid 1 milliGRAM(s) Oral daily  guaiFENesin  milliGRAM(s) Oral every 12 hours PRN  iron sucrose IVPB 200 milliGRAM(s) IV Intermittent <User Schedule>  melatonin 3 milliGRAM(s) Oral at bedtime PRN  morphine  - Injectable 2 milliGRAM(s) IV Push every 8 hours PRN  ondansetron Injectable 4 milliGRAM(s) IV Push every 6 hours PRN  sodium chloride 0.9%. 1000 milliLiter(s) IV Continuous <Continuous>

## 2023-11-20 LAB
CULTURE RESULTS: ABNORMAL
CULTURE RESULTS: ABNORMAL
HIV 1+2 AB+HIV1 P24 AG SERPL QL IA: SIGNIFICANT CHANGE UP
HIV 1+2 AB+HIV1 P24 AG SERPL QL IA: SIGNIFICANT CHANGE UP
NIGHT BLUE STAIN TISS: SIGNIFICANT CHANGE UP
NIGHT BLUE STAIN TISS: SIGNIFICANT CHANGE UP
SPECIMEN SOURCE: SIGNIFICANT CHANGE UP

## 2023-11-20 PROCEDURE — 99232 SBSQ HOSP IP/OBS MODERATE 35: CPT

## 2023-11-20 PROCEDURE — 99222 1ST HOSP IP/OBS MODERATE 55: CPT

## 2023-11-20 PROCEDURE — 99254 IP/OBS CNSLTJ NEW/EST MOD 60: CPT

## 2023-11-20 RX ADMIN — IRON SUCROSE 110 MILLIGRAM(S): 20 INJECTION, SOLUTION INTRAVENOUS at 18:11

## 2023-11-20 RX ADMIN — Medication 650 MILLIGRAM(S): at 12:33

## 2023-11-20 RX ADMIN — Medication 100 MILLIGRAM(S): at 11:30

## 2023-11-20 RX ADMIN — ENOXAPARIN SODIUM 40 MILLIGRAM(S): 100 INJECTION SUBCUTANEOUS at 21:53

## 2023-11-20 RX ADMIN — CEFEPIME 2000 MILLIGRAM(S): 1 INJECTION, POWDER, FOR SOLUTION INTRAMUSCULAR; INTRAVENOUS at 06:07

## 2023-11-20 RX ADMIN — Medication 100 MILLIGRAM(S): at 21:54

## 2023-11-20 RX ADMIN — Medication 650 MILLIGRAM(S): at 13:30

## 2023-11-20 RX ADMIN — Medication 600 MILLIGRAM(S): at 00:47

## 2023-11-20 RX ADMIN — Medication 1 MILLIGRAM(S): at 11:30

## 2023-11-20 RX ADMIN — CEFEPIME 2000 MILLIGRAM(S): 1 INJECTION, POWDER, FOR SOLUTION INTRAMUSCULAR; INTRAVENOUS at 21:53

## 2023-11-20 RX ADMIN — CEFEPIME 2000 MILLIGRAM(S): 1 INJECTION, POWDER, FOR SOLUTION INTRAMUSCULAR; INTRAVENOUS at 14:19

## 2023-11-20 NOTE — CONSULT NOTE ADULT - ASSESSMENT
33y Female no significant PMH admitted 11/17/23 with c/o worsening left chest pain found to have CHARLY PNA and bronchiectasis. Called for broncoscopy.    AFB x2 negative, awaiting third sample  Dr. Stein to review case, possible bronch later this week  cont ABX and therapy as per medical teams  IS 33y Female no significant PMH admitted 11/17/23 with c/o worsening left chest pain found to have CHARLY PNA and bronchiectasis. Called for broncoscopy.    AFB x2 negative, awaiting third sample  Dr. Stein to review case, possible bronch in future  cont ABX and therapy as per medical teams  IS

## 2023-11-20 NOTE — PROGRESS NOTE ADULT - SUBJECTIVE AND OBJECTIVE BOX
CC: The patient is a 33y Female with no significant PMH presented in ED with c/o worsening left chest pain since yesterday.  Her chest pain is mainly located in left upper chest.  She also has on and off fever, cough and chills for last couple weeks. Her appetite is also low, and has lost about 4-5 kg of body weight in last 5-6 months.   She denies any night sweats or diaphoresis. As per her , she has some lung problems going on for last few years while she was in [place]. [place] recently come to [place] ago.  She denies any personal or family history of tuberculosis.     11/18 - mild left sided chest pain, shortness of breath, able to lie flat in bed   11/19 - no cp palps sob abdo pain, c/o cough with chest pain, is OOB   11/20 - no cp palps sob abdo pain, cough better with hycodan, 2 afbs neg     Vital Signs Last 24 Hrs  T(C): 36.6 (20 Nov 2023 16:35), Max: 36.6 (20 Nov 2023 16:35)  T(F): 97.9 (20 Nov 2023 16:35), Max: 97.9 (20 Nov 2023 16:35)  HR: 98 (20 Nov 2023 16:35) (98 - 112)  BP: 102/62 (20 Nov 2023 16:35) (92/63 - 102/62)  RR: 18 (20 Nov 2023 16:35) (18 - 18)  SpO2: 95% (20 Nov 2023 16:35) (95% - 95%)  Patient On (Oxygen Delivery Method): room air  Constitutional: NAD, awake and alert  HEENT: PERR, EOMI  Neck: Soft and supple,  No JVD  Respiratory: Breath sounds are clear bilaterally, No wheezing, rales or rhonchi  Cardiovascular: S1 and S2, regular rate and rhythm, no Murmurs  Gastrointestinal: Bowel Sounds present, soft, nontender, nondistended  Extremities: No peripheral edema  Vascular: 2+ peripheral pulses  Neurological: A/O x 3, no focal deficits  Musculoskeletal: 5/5 strength b/l upper and lower extremities  Skin: No rashes    RAD:   < from: CT Angio Chest PE Protocol w/ IV Cont (11.17.23 @ 17:19) >  IMPRESSION: No pulmonary embolus is noted.  Findings suggestive of left upper lobe pneumonia.  Extensive bronchiectasis within both lungs.    LABS: All Labs Reviewed:  HIV NEG    Blood Culture: 11-18 @ 00:30  Organism --  Gram Stain Blood -- Gram Stain   Moderate polymorphonuclear leukocytes per low power field  Few Squamous epithelial cells per low power field  Moderate Gram Positive Cocci in Pairs and Chains seen per oil power field  Few Gram Negative Rods seen per oil power field  Few Gram Positive Rods seen per oil power field  Specimen Source .Sputum Sputum  Culture-Blood --    Culture - Acid Fast - Sputum w/Smear (collected 11-18-23 @ 17:30)  Source: .Sputum Sputum  Culture - Acid Fast - Sputum w/Smear (collected 11-18-23 @ 17:30)  Source: .Sputum Sputum        acetaminophen     Tablet .. 650 milliGRAM(s) Oral every 6 hours PRN  acetaminophen   IVPB .. 675 milliGRAM(s) IV Intermittent once  albuterol    90 MICROgram(s) HFA Inhaler 2 Puff(s) Inhalation every 6 hours PRN  aluminum hydroxide/magnesium hydroxide/simethicone Suspension 30 milliLiter(s) Oral every 4 hours PRN  cefepime  Injectable. 2000 milliGRAM(s) IV Push every 8 hours  doxycycline monohydrate Capsule 100 milliGRAM(s) Oral every 12 hours  enoxaparin Injectable 40 milliGRAM(s) SubCutaneous every 24 hours  folic acid 1 milliGRAM(s) Oral daily  guaiFENesin  milliGRAM(s) Oral every 12 hours PRN  hydrocodone/homatropine Syrup 5 milliLiter(s) Oral every 8 hours  melatonin 3 milliGRAM(s) Oral at bedtime PRN  morphine  - Injectable 2 milliGRAM(s) IV Push every 8 hours PRN  ondansetron Injectable 4 milliGRAM(s) IV Push every 6 hours PRN  sodium chloride 0.9%. 1000 milliLiter(s) IV Continuous <Continuous>

## 2023-11-20 NOTE — PROGRESS NOTE ADULT - SUBJECTIVE AND OBJECTIVE BOX
Subjective:    pat better, sitting in bed, AFBx1 neg, no respiratory distress.    Home Medications:  Albuterol (Eqv-ProAir HFA) 90 mcg/inh inhalation aerosol: 2 puff(s) inhaled every 6 hours (17 Nov 2023 20:15)  albuterol 2.5 mg/3 mL (0.083%) inhalation solution: 3 milliliter(s) by nebulizer 4 times a day (17 Nov 2023 20:15)  folic acid 1 mg oral tablet: 1 tab(s) orally once a day (17 Nov 2023 20:15)  naproxen 500 mg oral tablet: 1 tab(s) orally once a day as needed for (17 Nov 2023 20:15)    MEDICATIONS  (STANDING):  acetaminophen   IVPB .. 675 milliGRAM(s) IV Intermittent once  cefepime  Injectable. 2000 milliGRAM(s) IV Push every 8 hours  doxycycline monohydrate Capsule 100 milliGRAM(s) Oral every 12 hours  enoxaparin Injectable 40 milliGRAM(s) SubCutaneous every 24 hours  folic acid 1 milliGRAM(s) Oral daily  hydrocodone/homatropine Syrup 5 milliLiter(s) Oral every 8 hours  iron sucrose IVPB 200 milliGRAM(s) IV Intermittent <User Schedule>  sodium chloride 0.9%. 1000 milliLiter(s) (100 mL/Hr) IV Continuous <Continuous>    MEDICATIONS  (PRN):  acetaminophen     Tablet .. 650 milliGRAM(s) Oral every 6 hours PRN Temp greater or equal to 38C (100.4F), Mild Pain (1 - 3)  albuterol    90 MICROgram(s) HFA Inhaler 2 Puff(s) Inhalation every 6 hours PRN Shortness of Breath and/or Wheezing  aluminum hydroxide/magnesium hydroxide/simethicone Suspension 30 milliLiter(s) Oral every 4 hours PRN Dyspepsia  guaiFENesin  milliGRAM(s) Oral every 12 hours PRN Cough  melatonin 3 milliGRAM(s) Oral at bedtime PRN Insomnia  morphine  - Injectable 2 milliGRAM(s) IV Push every 8 hours PRN Moderate Pain (4 - 6)  ondansetron Injectable 4 milliGRAM(s) IV Push every 6 hours PRN Nausea and/or Vomiting      Allergies    No Known Allergies    Intolerances        Vital Signs Last 24 Hrs  T(C): 36.9 (19 Nov 2023 16:33), Max: 36.9 (19 Nov 2023 16:33)  T(F): 98.4 (19 Nov 2023 16:33), Max: 98.4 (19 Nov 2023 16:33)  HR: 106 (19 Nov 2023 16:33) (106 - 106)  BP: 92/59 (19 Nov 2023 16:33) (92/59 - 92/59)  BP(mean): --  RR: 18 (19 Nov 2023 16:33) (18 - 18)  SpO2: 98% (19 Nov 2023 16:33) (98% - 98%)    Parameters below as of 19 Nov 2023 23:22  Patient On (Oxygen Delivery Method): room air          PHYSICAL EXAMINATION:    NECK:  Supple. No lymphadenopathy. Jugular venous pressure not elevated. Carotids equal.   HEART:   The cardiac impulse has a normal quality. Reg., Nl S1 and S2.  There are no murmurs, rubs or gallops noted  CHEST:  Chest rhonchi to auscultation. Normal respiratory effort.  ABDOMEN:  Soft and nontender.   EXTREMITIES:  There is no edema.       LABS:

## 2023-11-20 NOTE — CONSULT NOTE ADULT - REASON FOR ADMISSION
CHARLY pneumonia and B/L bronchiectasis

## 2023-11-20 NOTE — PROGRESS NOTE ADULT - SUBJECTIVE AND OBJECTIVE BOX
Date of service: 11-20-23 @ 12:52    pt seen and examined  feels better   has cough  had some vomiting with cough medicine  no fevers     ROS: no fever or chills; denies dizziness, no HA, no abdominal pain, no diarrhea or constipation; no dysuria, no urinary frequency, no legs pain, no rashes    MEDICATIONS  (STANDING):  acetaminophen   IVPB .. 675 milliGRAM(s) IV Intermittent once  cefepime  Injectable. 2000 milliGRAM(s) IV Push every 8 hours  doxycycline monohydrate Capsule 100 milliGRAM(s) Oral every 12 hours  enoxaparin Injectable 40 milliGRAM(s) SubCutaneous every 24 hours  folic acid 1 milliGRAM(s) Oral daily  hydrocodone/homatropine Syrup 5 milliLiter(s) Oral every 8 hours  iron sucrose IVPB 200 milliGRAM(s) IV Intermittent <User Schedule>  sodium chloride 0.9%. 1000 milliLiter(s) (100 mL/Hr) IV Continuous <Continuous>    Vital Signs Last 24 Hrs  T(C): 36.9 (19 Nov 2023 16:33), Max: 36.9 (19 Nov 2023 16:33)  T(F): 98.4 (19 Nov 2023 16:33), Max: 98.4 (19 Nov 2023 16:33)  HR: 106 (19 Nov 2023 16:33) (106 - 106)  BP: 92/59 (19 Nov 2023 16:33) (92/59 - 92/59)  BP(mean): --  RR: 18 (19 Nov 2023 16:33) (18 - 18)  SpO2: 98% (19 Nov 2023 16:33) (98% - 98%)    Parameters below as of 19 Nov 2023 23:22  Patient On (Oxygen Delivery Method): room air              PE:  Constitutional: NAD   HEENT: NC/AT, EOMI, PERRLA, conjunctivae clear; ears and nose atraumatic; pharynx benign  Neck: supple; thyroid not palpable  Back: no tenderness  Respiratory: decreased breath sounds, rhonchi   Cardiovascular: S1S2 regular, no murmurs  Abdomen: soft, not tender, not distended, positive BS; liver and spleen WNL  Genitourinary: no suprapubic tenderness  Lymphatic: no LN palpable  Musculoskeletal: no muscle tenderness, no joint swelling or tenderness  Extremities: no pedal edema  Neurological/ Psychiatric: AxOx3, Judgement and insight normal;  moving all extremities  Skin: no rashes; no palpable lesions    Labs: all available labs reviewed                              LIVER FUNCTIONS - ( 17 Nov 2023 15:26 )  Alb: 3.0 g/dL / Pro: 7.9 gm/dL / ALK PHOS: 105 U/L / ALT: 17 U/L / AST: 15 U/L / GGT: x           Urinalysis Basic - ( 18 Nov 2023 08:03 )    Color: x / Appearance: x / SG: x / pH: x  Gluc: 114 mg/dL / Ketone: x  / Bili: x / Urobili: x   Blood: x / Protein: x / Nitrite: x   Leuk Esterase: x / RBC: x / WBC x   Sq Epi: x / Non Sq Epi: x / Bacteria: x    Culture - Sputum (11.18.23 @ 00:30)   Gram Stain:   Moderate polymorphonuclear leukocytes per low power field   Few Squamous epithelial cells per low power field   Moderate Gram Positive Cocci in Pairs and Chains seen per oil power field   Few Gram Negative Rods seen per oil power field   Few Gram Positive Rods seen per oil power field  Specimen Source: .Sputum Sputum  Culture Results:   Normal Respiratory Terra present   Culture - Blood (11.17.23 @ 17:33)   Specimen Source: .Blood None  Culture Results:   No growth at 24 hours      Radiology: all available radiological tests reviewed  < from: CT Angio Chest PE Protocol w/ IV Cont (11.17.23 @ 17:19) >    ACC: [id number] EXAM:  CT ANGIO CHEST PULM [facility]   ORDERED BY: ROSY OWENS     PROCEDURE DATE:  11/17/2023          INTERPRETATION:  Clinical information: Evaluate for pulmonary embolus.   Exam is compared to previous study of 8/23/2023.    CT angiogram of the chest was obtained following administration of   intravenous contrast. Approximately 70 cc of Omnipaque 350 was   administered and 30 cc was discarded. Coronal, sagittal and MIP images   were submitted for review.    Few lymph nodes are present in the subcarinal space, AP window and in the   hilar regions bilaterally.    Heart is normal in size. No pericardial effusion is noted. Pulmonary   arteries are normal in caliber. No filling defects are noted.    No endobronchial lesions are noted. Extensive bronchiectasis is noted   within both lungs. Some of the dilated bronchi are filled with   mucus/secretions. Fairly large consolidation is noted in the left upper   lobe. It is new when compared to previous exam. No pleural effusions are   noted.    Below the diaphragm, visualized portions of the abdomen are unremarkable.    Visualized osseous structures are within normal limits.    IMPRESSION: No pulmonary embolus is noted.    Findings suggestive of left upper lobe pneumonia.    Extensive bronchiectasis within both lungs.        Advanced directives addressed: full resuscitation

## 2023-11-20 NOTE — PROGRESS NOTE ADULT - ASSESSMENT
33y Female with no significant PMH presented in ED with c/o worsening left chest pain since day prior to admit. Her chest pain is mainly located in left upper chest.  She also has on and off fever, cough and chills for last couple weeks. Her appetite is also low, and has lost about 4-5 kg of body weight in last 5-6 months.   She denies any night sweats or diaphoresis. As per her , she has some lung problems going on for last few years while she was in [place]. [place] recently come to [place] ago.  She denies any personal or family history of tuberculosis.  Upon arrival in ED, she was tachycardic with HR in 140s but afebrile. She is saturating well on RA.  Sig labs: WBC 22.12k, N 86%, Lactate 1.8,  D-dimer 480,  Hb11.6, MCV 74, RDW 16, . Lipase 14 and Mg 2.1. CXR CHARLY consolidation. CT angio chest: Negative for PE, but shows CHARLY pneumonia and B/L extensive bronchiectasis. EK bpm in sinus tachycardia. NS 2 L bolus and Levaquin 740 mg IV given in ED.    1. Fever. CHARLY pneumonia. Extensive bilateral bronchiectasis. Leukocytosis  - imaging reviewed  - pulmonary eval noted  - concern for possible MTB with clinical presentation and radiological findings - airborne precautions - sputum afb (-) x 1 f/u 2 more   - on IV cefepime 2gmq8h #3  - on doxycycline 100mg BID #3  - continue with antibiotic coverage   - f/u cultures, sputum cx nl mike, legionella ag,(-)  - was having work up prior to arrival for cystic fibrosis per pts friend - has been sick and had previous pulmonary illness as child as well   - may require bronchoscopy pending above studies  - HIV test (-)  - monitor temps  - tolerating abx well so far; no side effects noted  - reason for abx use and side effects reviewed with patient  - supportive care  - fu cbc    2. other issues - care per medicine

## 2023-11-20 NOTE — PROGRESS NOTE ADULT - ASSESSMENT
# Community acquired pneumonia of CHARLY.  # B/L extensive bronchiectasis.  CT angio chest: Negative for PE, but shows CHARLY pneumonia and B/L extensive bronchiectasis.  Change to CEFEPIME DOXY,   Albuterol prn.  Pt sees Dr Jenn Guidry [facility]   Pt reports freq pneumonias since her childhood   arrived from [place] 4 mos ago   never had a bronch, discussed with ID - place in isolation, check afb sputum    - follow-up sputum AFB results   Cont IV ABX  add Hycodan   discussed with Dr Patel - suspects lymphangioleiomyomatosis   pt will need bronch on this admission once afb  results are back and if neg    - cont IV ABX  AFB sputum NEG x2  cont hycodan   plan for bronch on this admn     # Sinus tachycardia likely due to pneumonia.  EK bpm in sinus tachycardia.  Will get TSH level.   - add IVFs     # Hypochromic microcytic  anemia.  Hb11.6, MCV 74, RDW 16.  Will order Iron panel, Ferritin, B12 and Folate levels.  Start IV Venofer    - cont IV Venofer     # Acute hyperglycemia, likely reactive.  .  Will get A1c level.    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

## 2023-11-20 NOTE — PROGRESS NOTE ADULT - ASSESSMENT
33y Female with no significant PMH presented in ED with c/o worsening left chest pain since yesterday.  Her chest pain is mainly located in left upper chest.  She also has on and off fever, cough and chills for last couple weeks. Her appetite is also low, and has lost about 4-5 kg of body weight in last 5-6 months.   She denies any night sweats or diaphoresis. As per her , she has some lung problems going on for last few years while she was in [place]. [place] recently come to [place] ago.  She denies any personal or family history of tuberculosis.     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

## 2023-11-20 NOTE — CONSULT NOTE ADULT - SUBJECTIVE AND OBJECTIVE BOX
History of Present Illness:  33y Female no significant PMH admitted 11/17/23 with c/o worsening left chest pain found to have CHARLY PNA and bronchiectasis .  +intermittent fever, cough and chills for last couple weeks. + wt loss and decreased appetite. [place].  She denies any personal or family history of tuberculosis.     PMH/PSH:  No pertinent past medical history    History of lung disease    Pneumonia      No significant past surgical history    No pertinent past surgical history        Relevant Family History  FAMILY HISTORY:  No pertinent family history        SOCIAL HISTORY:  Smoker: [ ] Yes  [x ] No        PACK YEARS:                         WHEN QUIT?  ETOH use: [ ] Yes  [x ] No              FREQUENCY / QUANTITY:  Ilicit Drug use:  [ ] Yes  [x ] No      MEDICATIONS  (STANDING):  acetaminophen   IVPB .. 675 milliGRAM(s) IV Intermittent once  cefepime  Injectable. 2000 milliGRAM(s) IV Push every 8 hours  doxycycline monohydrate Capsule 100 milliGRAM(s) Oral every 12 hours  enoxaparin Injectable 40 milliGRAM(s) SubCutaneous every 24 hours  folic acid 1 milliGRAM(s) Oral daily  hydrocodone/homatropine Syrup 5 milliLiter(s) Oral every 8 hours  sodium chloride 0.9%. 1000 milliLiter(s) (100 mL/Hr) IV Continuous <Continuous>    MEDICATIONS  (PRN):  acetaminophen     Tablet .. 650 milliGRAM(s) Oral every 6 hours PRN Temp greater or equal to 38C (100.4F), Mild Pain (1 - 3)  albuterol    90 MICROgram(s) HFA Inhaler 2 Puff(s) Inhalation every 6 hours PRN Shortness of Breath and/or Wheezing  aluminum hydroxide/magnesium hydroxide/simethicone Suspension 30 milliLiter(s) Oral every 4 hours PRN Dyspepsia  guaiFENesin  milliGRAM(s) Oral every 12 hours PRN Cough  melatonin 3 milliGRAM(s) Oral at bedtime PRN Insomnia  morphine  - Injectable 2 milliGRAM(s) IV Push every 8 hours PRN Moderate Pain (4 - 6)  ondansetron Injectable 4 milliGRAM(s) IV Push every 6 hours PRN Nausea and/or Vomiting      Allergies: No Known Allergies                                                            LABS:      < from: CT Angio Chest PE Protocol w/ IV Cont (11.17.23 @ 17:19) >  CT angiogram of the chest was obtained following administration of   intravenous contrast. Approximately 70 cc of Omnipaque 350 was   administered and 30 cc was discarded. Coronal, sagittal and MIP images   were submitted for review.    Few lymph nodes are present in the subcarinal space, AP window and in the   hilar regions bilaterally.    Heart is normal in size. No pericardial effusion is noted. Pulmonary   arteries are normal in caliber. No filling defects are noted.    No endobronchial lesions are noted. Extensive bronchiectasis is noted   within both lungs. Some of the dilated bronchi are filled with   mucus/secretions. Fairly large consolidation is noted in the left upper   lobe. It is new when compared to previous exam. No pleural effusions are   noted.    Below the diaphragm, visualized portions of the abdomen are unremarkable.    Visualized osseous structures are within normal limits.    IMPRESSION: No pulmonary embolus is noted.    Findings suggestive of left upper lobe pneumonia.    Extensive bronchiectasis within both lungs.    < end of copied text >                    Review of Systems         as per HPI    T(C): 36.6 (11-20-23 @ 16:35), Max: 36.6 (11-20-23 @ 16:35)  HR: 98 (11-20-23 @ 16:35) (98 - 112)  BP: 102/62 (11-20-23 @ 16:35) (92/63 - 102/62)  RR: 18 (11-20-23 @ 16:35) (18 - 18)  SpO2: 95% (11-20-23 @ 16:35) (95% - 95%)      Physical Exam  General:  NAD                                                         Neuro: A+O x 3,                      Eyes: PERRL, EOMI   ENT: Normal exam of nasal/oral mucosa with absence of cyanosis.   Neck: supple, no JVD, trachea midline   Chest: , normal excursion, no accessory muscle use note  CV: RRR,   GI: soft, NT, ND,   Extremities: RODRIGUEZ x 4, no C/C/E, distal motor/neuro/circ intact  SKIN: warm, dry, intact    History of Present Illness:  33y Female no significant PMH admitted 11/17/23 with c/o worsening left chest pain found to have CHARLY PNA and bronchiectasis .  +intermittent fever, cough and chills for last couple weeks. + wt loss and decreased appetite. [place] recently [place] to [place] ago.  She denies any personal or family history of tuberculosis. Denies coughing up any sputum.    PMH/PSH:  No pertinent past medical history    History of lung disease    Pneumonia      No significant past surgical history    No pertinent past surgical history        Relevant Family History  FAMILY HISTORY:  No pertinent family history        SOCIAL HISTORY:  Smoker: [ ] Yes  [x ] No        PACK YEARS:                         WHEN QUIT?  ETOH use: [ ] Yes  [x ] No              FREQUENCY / QUANTITY:  Ilicit Drug use:  [ ] Yes  [x ] No      MEDICATIONS  (STANDING):  acetaminophen   IVPB .. 675 milliGRAM(s) IV Intermittent once  cefepime  Injectable. 2000 milliGRAM(s) IV Push every 8 hours  doxycycline monohydrate Capsule 100 milliGRAM(s) Oral every 12 hours  enoxaparin Injectable 40 milliGRAM(s) SubCutaneous every 24 hours  folic acid 1 milliGRAM(s) Oral daily  hydrocodone/homatropine Syrup 5 milliLiter(s) Oral every 8 hours  sodium chloride 0.9%. 1000 milliLiter(s) (100 mL/Hr) IV Continuous <Continuous>    MEDICATIONS  (PRN):  acetaminophen     Tablet .. 650 milliGRAM(s) Oral every 6 hours PRN Temp greater or equal to 38C (100.4F), Mild Pain (1 - 3)  albuterol    90 MICROgram(s) HFA Inhaler 2 Puff(s) Inhalation every 6 hours PRN Shortness of Breath and/or Wheezing  aluminum hydroxide/magnesium hydroxide/simethicone Suspension 30 milliLiter(s) Oral every 4 hours PRN Dyspepsia  guaiFENesin  milliGRAM(s) Oral every 12 hours PRN Cough  melatonin 3 milliGRAM(s) Oral at bedtime PRN Insomnia  morphine  - Injectable 2 milliGRAM(s) IV Push every 8 hours PRN Moderate Pain (4 - 6)  ondansetron Injectable 4 milliGRAM(s) IV Push every 6 hours PRN Nausea and/or Vomiting      Allergies: No Known Allergies                                                            LABS:      < from: CT Angio Chest PE Protocol w/ IV Cont (11.17.23 @ 17:19) >  CT angiogram of the chest was obtained following administration of   intravenous contrast. Approximately 70 cc of Omnipaque 350 was   administered and 30 cc was discarded. Coronal, sagittal and MIP images   were submitted for review.    Few lymph nodes are present in the subcarinal space, AP window and in the   hilar regions bilaterally.    Heart is normal in size. No pericardial effusion is noted. Pulmonary   arteries are normal in caliber. No filling defects are noted.    No endobronchial lesions are noted. Extensive bronchiectasis is noted   within both lungs. Some of the dilated bronchi are filled with   mucus/secretions. Fairly large consolidation is noted in the left upper   lobe. It is new when compared to previous exam. No pleural effusions are   noted.    Below the diaphragm, visualized portions of the abdomen are unremarkable.    Visualized osseous structures are within normal limits.    IMPRESSION: No pulmonary embolus is noted.    Findings suggestive of left upper lobe pneumonia.    Extensive bronchiectasis within both lungs.    < end of copied text >                    Review of Systems         as per HPI    T(C): 36.6 (11-20-23 @ 16:35), Max: 36.6 (11-20-23 @ 16:35)  HR: 98 (11-20-23 @ 16:35) (98 - 112)  BP: 102/62 (11-20-23 @ 16:35) (92/63 - 102/62)  RR: 18 (11-20-23 @ 16:35) (18 - 18)  SpO2: 95% (11-20-23 @ 16:35) (95% - 95%)      Physical Exam  General:  NAD                                                         Neuro: A+O x 3,                      Eyes: PERRL, EOMI   ENT: Normal exam of nasal/oral mucosa with absence of cyanosis.   Neck: supple, no JVD, trachea midline   Chest: , normal excursion, no accessory muscle use note  CV: RRR,   GI: soft, NT, ND, thin   Extremities: no edema  SKIN: warm, dry, intact

## 2023-11-21 LAB
ANION GAP SERPL CALC-SCNC: 6 MMOL/L — SIGNIFICANT CHANGE UP (ref 5–17)
ANION GAP SERPL CALC-SCNC: 6 MMOL/L — SIGNIFICANT CHANGE UP (ref 5–17)
BUN SERPL-MCNC: 12 MG/DL — SIGNIFICANT CHANGE UP (ref 7–23)
BUN SERPL-MCNC: 12 MG/DL — SIGNIFICANT CHANGE UP (ref 7–23)
CALCIUM SERPL-MCNC: 9.4 MG/DL — SIGNIFICANT CHANGE UP (ref 8.5–10.1)
CALCIUM SERPL-MCNC: 9.4 MG/DL — SIGNIFICANT CHANGE UP (ref 8.5–10.1)
CHLORIDE SERPL-SCNC: 104 MMOL/L — SIGNIFICANT CHANGE UP (ref 96–108)
CHLORIDE SERPL-SCNC: 104 MMOL/L — SIGNIFICANT CHANGE UP (ref 96–108)
CO2 SERPL-SCNC: 28 MMOL/L — SIGNIFICANT CHANGE UP (ref 22–31)
CO2 SERPL-SCNC: 28 MMOL/L — SIGNIFICANT CHANGE UP (ref 22–31)
CREAT SERPL-MCNC: 0.54 MG/DL — SIGNIFICANT CHANGE UP (ref 0.5–1.3)
CREAT SERPL-MCNC: 0.54 MG/DL — SIGNIFICANT CHANGE UP (ref 0.5–1.3)
EGFR: 126 ML/MIN/1.73M2 — SIGNIFICANT CHANGE UP
EGFR: 126 ML/MIN/1.73M2 — SIGNIFICANT CHANGE UP
GLUCOSE SERPL-MCNC: 115 MG/DL — HIGH (ref 70–99)
GLUCOSE SERPL-MCNC: 115 MG/DL — HIGH (ref 70–99)
HCT VFR BLD CALC: 29.4 % — LOW (ref 34.5–45)
HCT VFR BLD CALC: 29.4 % — LOW (ref 34.5–45)
HGB BLD-MCNC: 9.7 G/DL — LOW (ref 11.5–15.5)
HGB BLD-MCNC: 9.7 G/DL — LOW (ref 11.5–15.5)
MAGNESIUM SERPL-MCNC: 2.3 MG/DL — SIGNIFICANT CHANGE UP (ref 1.6–2.6)
MAGNESIUM SERPL-MCNC: 2.3 MG/DL — SIGNIFICANT CHANGE UP (ref 1.6–2.6)
MCHC RBC-ENTMCNC: 24.5 PG — LOW (ref 27–34)
MCHC RBC-ENTMCNC: 24.5 PG — LOW (ref 27–34)
MCHC RBC-ENTMCNC: 33 GM/DL — SIGNIFICANT CHANGE UP (ref 32–36)
MCHC RBC-ENTMCNC: 33 GM/DL — SIGNIFICANT CHANGE UP (ref 32–36)
MCV RBC AUTO: 74.2 FL — LOW (ref 80–100)
MCV RBC AUTO: 74.2 FL — LOW (ref 80–100)
NIGHT BLUE STAIN TISS: SIGNIFICANT CHANGE UP
NIGHT BLUE STAIN TISS: SIGNIFICANT CHANGE UP
PLATELET # BLD AUTO: 412 K/UL — HIGH (ref 150–400)
PLATELET # BLD AUTO: 412 K/UL — HIGH (ref 150–400)
POTASSIUM SERPL-MCNC: 3.9 MMOL/L — SIGNIFICANT CHANGE UP (ref 3.5–5.3)
POTASSIUM SERPL-MCNC: 3.9 MMOL/L — SIGNIFICANT CHANGE UP (ref 3.5–5.3)
POTASSIUM SERPL-SCNC: 3.9 MMOL/L — SIGNIFICANT CHANGE UP (ref 3.5–5.3)
POTASSIUM SERPL-SCNC: 3.9 MMOL/L — SIGNIFICANT CHANGE UP (ref 3.5–5.3)
RBC # BLD: 3.96 M/UL — SIGNIFICANT CHANGE UP (ref 3.8–5.2)
RBC # BLD: 3.96 M/UL — SIGNIFICANT CHANGE UP (ref 3.8–5.2)
RBC # FLD: 16 % — HIGH (ref 10.3–14.5)
RBC # FLD: 16 % — HIGH (ref 10.3–14.5)
SODIUM SERPL-SCNC: 138 MMOL/L — SIGNIFICANT CHANGE UP (ref 135–145)
SODIUM SERPL-SCNC: 138 MMOL/L — SIGNIFICANT CHANGE UP (ref 135–145)
SPECIMEN SOURCE: SIGNIFICANT CHANGE UP
SPECIMEN SOURCE: SIGNIFICANT CHANGE UP
WBC # BLD: 12.8 K/UL — HIGH (ref 3.8–10.5)
WBC # BLD: 12.8 K/UL — HIGH (ref 3.8–10.5)
WBC # FLD AUTO: 12.8 K/UL — HIGH (ref 3.8–10.5)
WBC # FLD AUTO: 12.8 K/UL — HIGH (ref 3.8–10.5)

## 2023-11-21 PROCEDURE — 99232 SBSQ HOSP IP/OBS MODERATE 35: CPT

## 2023-11-21 RX ADMIN — CEFEPIME 2000 MILLIGRAM(S): 1 INJECTION, POWDER, FOR SOLUTION INTRAMUSCULAR; INTRAVENOUS at 05:37

## 2023-11-21 RX ADMIN — Medication 1 MILLIGRAM(S): at 10:19

## 2023-11-21 RX ADMIN — ENOXAPARIN SODIUM 40 MILLIGRAM(S): 100 INJECTION SUBCUTANEOUS at 17:31

## 2023-11-21 RX ADMIN — CEFEPIME 2000 MILLIGRAM(S): 1 INJECTION, POWDER, FOR SOLUTION INTRAMUSCULAR; INTRAVENOUS at 15:07

## 2023-11-21 RX ADMIN — Medication 600 MILLIGRAM(S): at 10:18

## 2023-11-21 RX ADMIN — CEFEPIME 2000 MILLIGRAM(S): 1 INJECTION, POWDER, FOR SOLUTION INTRAMUSCULAR; INTRAVENOUS at 22:17

## 2023-11-21 NOTE — PROGRESS NOTE ADULT - ASSESSMENT
33y Female with no significant PMH presented in ED with c/o worsening left chest pain since day prior to admit. Her chest pain is mainly located in left upper chest.  She also has on and off fever, cough and chills for last couple weeks. Her appetite is also low, and has lost about 4-5 kg of body weight in last 5-6 months.   She denies any night sweats or diaphoresis. As per her , she has some lung problems going on for last few years while she was in [place]. [place] recently come to [place] ago.  She denies any personal or family history of tuberculosis.  Upon arrival in ED, she was tachycardic with HR in 140s but afebrile. She is saturating well on RA.  Sig labs: WBC 22.12k, N 86%, Lactate 1.8,  D-dimer 480,  Hb11.6, MCV 74, RDW 16, . Lipase 14 and Mg 2.1. CXR CHARLY consolidation. CT angio chest: Negative for PE, but shows CHARLY pneumonia and B/L extensive bronchiectasis. EK bpm in sinus tachycardia. NS 2 L bolus and Levaquin 740 mg IV given in ED.    1. Fever. CHARLY pneumonia. Extensive bilateral bronchiectasis. Leukocytosis  - imaging reviewed  - pulmonary eval noted  - concern for possible MTB with clinical presentation and radiological findings - airborne precautions - sputum afb (-) x 2 f/u 3rd sputum  - on IV cefepime 2gmq8h #4  - on doxycycline 100mg BID #4  - continue with antibiotic coverage   - f/u cultures, sputum cx nl mike, legionella ag,(-)  - was having work up prior to arrival for cystic fibrosis per pts friend - has been sick and had previous pulmonary illness as child as well   - may require bronchoscopy pending above studies  - HIV test (-)  - monitor temps  - tolerating abx well so far; no side effects noted  - reason for abx use and side effects reviewed with patient  - supportive care  - fu cbc    2. other issues - care per medicine  33y Female with no significant PMH presented in ED with c/o worsening left chest pain since day prior to admit. Her chest pain is mainly located in left upper chest.  She also has on and off fever, cough and chills for last couple weeks. Her appetite is also low, and has lost about 4-5 kg of body weight in last 5-6 months.   She denies any night sweats or diaphoresis. As per her , she has some lung problems going on for last few years while she was in [place]. [place] recently come to [place] ago.  She denies any personal or family history of tuberculosis.  Upon arrival in ED, she was tachycardic with HR in 140s but afebrile. She is saturating well on RA.  Sig labs: WBC 22.12k, N 86%, Lactate 1.8,  D-dimer 480,  Hb11.6, MCV 74, RDW 16, . Lipase 14 and Mg 2.1. CXR CHARLY consolidation. CT angio chest: Negative for PE, but shows CHARLY pneumonia and B/L extensive bronchiectasis. EK bpm in sinus tachycardia. NS 2 L bolus and Levaquin 740 mg IV given in ED.    1. Fever. CHARLY pneumonia. 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 Leukocytosis  - imaging reviewed  - pulmonary eval noted; case d/w pulmonary  - concern for possible MTB with clinical presentation and radiological findings - airborne precautions - sputum afb (-) x 2 f/u 3rd sputum - if negative dc airborne precautions  - on IV cefepime 2gmq8h #4  - on doxycycline 100mg BID #4  - continue with antibiotic coverage   - f/u cultures, sputum cx nl mike, legionella ag,(-)  - was having work up prior to arrival for cystic fibrosis per pts friend - has been sick and had previous pulmonary illness as child as well   - may require bronchoscopy- as outpt  - HIV test (-)  - on dc po ceftin 500mg BID 7-10 day course, doxycycline 100mg BID x 7 days total   - tolerating abx well so far; no side effects noted  - reason for abx use and side effects reviewed with patient  - supportive care  - fu cbc    2. other issues - care per medicine

## 2023-11-21 NOTE — PROGRESS NOTE ADULT - ASSESSMENT
33y Female with no significant PMH presented in ED with c/o worsening left chest pain since yesterday.  Her chest pain is mainly located in left upper chest.  She also has on and off fever, cough and chills for last couple weeks. Her appetite is also low, and has lost about 4-5 kg of body weight in last 5-6 months.   She denies any night sweats or diaphoresis. As per her , she has some lung problems going on for last few years while she was in [place]. [place] recently come to [place] ago.  She denies any personal or family history of tuberculosis.     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

## 2023-11-21 NOTE — PROGRESS NOTE ADULT - SUBJECTIVE AND OBJECTIVE BOX
Subjective:  Patient c/o HA but overall feeling better. No fever, chills, SOB, chest pain, night sweats, hemoptysis. AFB sputum negative x 2. Third culture sent this AM as per RN.     Vital Signs:  Vital Signs Last 24 Hrs  T(C): 37.1 (11-21-23 @ 08:17), Max: 37.1 (11-21-23 @ 08:17)  T(F): 98.8 (11-21-23 @ 08:17), Max: 98.8 (11-21-23 @ 08:17)  HR: 112 (11-21-23 @ 08:17) (98 - 112)  BP: 98/53 (11-21-23 @ 08:17) (98/53 - 110/56)  RR: 18 (11-21-23 @ 08:17) (18 - 18)  SpO2: 93% (11-21-23 @ 08:17) (93% - 95%) on (O2)      General:  NAD  Neurology: Awake, nonfocal, RODRIGUEZ x 4  Eyes: Scleras clear  ENT: Gross hearing intact, grossly patent pharynx, no stridor  Neck: Neck supple, trachea midline, No JVD  Respiratory: CTA B/L  CV: S1S2, no murmurs, rubs or gallops  Abdominal: Soft, NT, ND  Extremities: No edema    Relevant labs, radiology and Medications reviewed                        9.7    12.80 )-----------( 412      ( 21 Nov 2023 07:11 )             29.4     11-21    138  |  104  |  12  ----------------------------<  115<H>  3.9   |  28  |  0.54    Ca    9.4      21 Nov 2023 07:11  Mg     2.3     11-21        MEDICATIONS  (STANDING):  acetaminophen   IVPB .. 675 milliGRAM(s) IV Intermittent once  cefepime  Injectable. 2000 milliGRAM(s) IV Push every 8 hours  enoxaparin Injectable 40 milliGRAM(s) SubCutaneous every 24 hours  folic acid 1 milliGRAM(s) Oral daily  hydrocodone/homatropine Syrup 5 milliLiter(s) Oral every 8 hours  sodium chloride 0.9%. 1000 milliLiter(s) (100 mL/Hr) IV Continuous <Continuous>    MEDICATIONS  (PRN):  acetaminophen     Tablet .. 650 milliGRAM(s) Oral every 6 hours PRN Temp greater or equal to 38C (100.4F), Mild Pain (1 - 3)  albuterol    90 MICROgram(s) HFA Inhaler 2 Puff(s) Inhalation every 6 hours PRN Shortness of Breath and/or Wheezing  aluminum hydroxide/magnesium hydroxide/simethicone Suspension 30 milliLiter(s) Oral every 4 hours PRN Dyspepsia  guaiFENesin  milliGRAM(s) Oral every 12 hours PRN Cough  melatonin 3 milliGRAM(s) Oral at bedtime PRN Insomnia  morphine  - Injectable 2 milliGRAM(s) IV Push every 8 hours PRN Moderate Pain (4 - 6)  ondansetron Injectable 4 milliGRAM(s) IV Push every 6 hours PRN Nausea and/or Vomiting        Assessment  33y Female  w/ PAST MEDICAL & SURGICAL HISTORY:  History of lung disease      Pneumonia      No pertinent past surgical history      admitted with complaints of Patient is a 33y old  Female who presents with a chief complaint of CHARLY pneumonia and B/L bronchiectasis (21 Nov 2023 08:30)

## 2023-11-21 NOTE — PHARMACOTHERAPY INTERVENTION NOTE - COMMENTS
Recommended to discontinue azithromycin in the setting of negative Legionella antigen in a patient being treated for PNA. Patient is also currently being treated with cefepime. WBC is 20.48 mg/dL, and O2 95% on RA.    Elza Mccollum, PharmD  Clinical Pharmacy Specialist, Infectious Diseases  Tele-Antimicrobial Stewardship Program (Tele-ASP)  Tele-ASP Phone: [phone] 
Medication history complete, reviewed medications with patient spouse at bedside and confirmed with doctor first med hx.

## 2023-11-21 NOTE — PROGRESS NOTE ADULT - SUBJECTIVE AND OBJECTIVE BOX
Date of service: 11-21-23 @ 08:31    pt seen and examined  feels better   less cough  some pleuritic cp  no fevers     ROS: no fever or chills; denies dizziness, no HA, no abdominal pain, no diarrhea or constipation; no dysuria, no urinary frequency, no legs pain, no rashes      MEDICATIONS  (STANDING):  acetaminophen   IVPB .. 675 milliGRAM(s) IV Intermittent once  cefepime  Injectable. 2000 milliGRAM(s) IV Push every 8 hours  enoxaparin Injectable 40 milliGRAM(s) SubCutaneous every 24 hours  folic acid 1 milliGRAM(s) Oral daily  hydrocodone/homatropine Syrup 5 milliLiter(s) Oral every 8 hours  sodium chloride 0.9%. 1000 milliLiter(s) (100 mL/Hr) IV Continuous <Continuous>    Vital Signs Last 24 Hrs  T(C): 37.1 (21 Nov 2023 08:17), Max: 37.1 (21 Nov 2023 08:17)  T(F): 98.8 (21 Nov 2023 08:17), Max: 98.8 (21 Nov 2023 08:17)  HR: 112 (21 Nov 2023 08:17) (98 - 112)  BP: 98/53 (21 Nov 2023 08:17) (98/53 - 110/56)  BP(mean): --  RR: 18 (21 Nov 2023 08:17) (18 - 18)  SpO2: 93% (21 Nov 2023 08:17) (93% - 95%)    Parameters below as of 21 Nov 2023 08:17  Patient On (Oxygen Delivery Method): room air      PE:  Constitutional: NAD   HEENT: NC/AT, EOMI, PERRLA, conjunctivae clear; ears and nose atraumatic; pharynx benign  Neck: supple; thyroid not palpable  Back: no tenderness  Respiratory: decreased breath sounds, rhonchi   Cardiovascular: S1S2 regular, no murmurs  Abdomen: soft, not tender, not distended, positive BS; liver and spleen WNL  Genitourinary: no suprapubic tenderness  Lymphatic: no LN palpable  Musculoskeletal: no muscle tenderness, no joint swelling or tenderness  Extremities: no pedal edema  Neurological/ Psychiatric: AxOx3, Judgement and insight normal;  moving all extremities  Skin: no rashes; no palpable lesions    Labs: all available labs reviewed                                            9.7    12.80 )-----------( 412      ( 21 Nov 2023 07:11 )             29.4     11-21    138  |  104  |  12  ----------------------------<  115<H>  3.9   |  28  |  0.54    Ca    9.4      21 Nov 2023 07:11  Mg     2.3     11-21          LIVER FUNCTIONS - ( 17 Nov 2023 15:26 )  Alb: 3.0 g/dL / Pro: 7.9 gm/dL / ALK PHOS: 105 U/L / ALT: 17 U/L / AST: 15 U/L / GGT: x           Urinalysis Basic - ( 18 Nov 2023 08:03 )    Color: x / Appearance: x / SG: x / pH: x  Gluc: 114 mg/dL / Ketone: x  / Bili: x / Urobili: x   Blood: x / Protein: x / Nitrite: x   Leuk Esterase: x / RBC: x / WBC x   Sq Epi: x / Non Sq Epi: x / Bacteria: x    Culture - Sputum (11.18.23 @ 00:30)   Gram Stain:   Moderate polymorphonuclear leukocytes per low power field   Few Squamous epithelial cells per low power field   Moderate Gram Positive Cocci in Pairs and Chains seen per oil power field   Few Gram Negative Rods seen per oil power field   Few Gram Positive Rods seen per oil power field  Specimen Source: .Sputum Sputum  Culture Results:   Normal Respiratory Terra present   Culture - Blood (11.17.23 @ 17:33)   Specimen Source: .Blood None  Culture Results:   No growth at 24 hours      Radiology: all available radiological tests reviewed  < from: CT Angio Chest PE Protocol w/ IV Cont (11.17.23 @ 17:19) >    ACC: [id number] EXAM:  CT ANGIO CHEST PULM ART [facility]   ORDERED BY: ROSY OWENS     PROCEDURE DATE:  11/17/2023          INTERPRETATION:  Clinical information: Evaluate for pulmonary embolus.   Exam is compared to previous study of 8/23/2023.    CT angiogram of the chest was obtained following administration of   intravenous contrast. Approximately 70 cc of Omnipaque 350 was   administered and 30 cc was discarded. Coronal, sagittal and MIP images   were submitted for review.    Few lymph nodes are present in the subcarinal space, AP window and in the   hilar regions bilaterally.    Heart is normal in size. No pericardial effusion is noted. Pulmonary   arteries are normal in caliber. No filling defects are noted.    No endobronchial lesions are noted. Extensive bronchiectasis is noted   within both lungs. Some of the dilated bronchi are filled with   mucus/secretions. Fairly large consolidation is noted in the left upper   lobe. It is new when compared to previous exam. No pleural effusions are   noted.    Below the diaphragm, visualized portions of the abdomen are unremarkable.    Visualized osseous structures are within normal limits.    IMPRESSION: No pulmonary embolus is noted.    Findings suggestive of left upper lobe pneumonia.    Extensive bronchiectasis within both lungs.        Advanced directives addressed: full resuscitation

## 2023-11-21 NOTE — PROGRESS NOTE ADULT - SUBJECTIVE AND OBJECTIVE BOX
Patient is a 33y Female with no significant PMH presented in ED with c/o worsening left chest pain since yesterday.  Her chest pain is mainly located in left upper chest.  She also has on and off fever, cough and chills for last couple weeks. Her appetite is also low, and has lost about 4-5 kg of body weight in last 5-6 months.   She denies any night sweats or diaphoresis. As per her , she has some lung problems going on for last few years while she was in [place]. [place] recently come to [place] about 6 [place] ago.  She denies any personal or family history of tuberculosis.     PE  Constitutional: NAD, awake and alert  HEENT: PERR, EOMI  Neck: Soft and supple,  No JVD  Respiratory: Breath sounds are clear bilaterally, No wheezing, rales or rhonchi  Cardiovascular: S1 and S2, regular rate and rhythm, no Murmurs  Gastrointestinal: Bowel Sounds present, soft, nontender, nondistended  Extremities: No peripheral edema  Vascular: 2+ peripheral pulses  Neurological: A/O x 3, no focal deficits  Musculoskeletal: 5/5 strength b/l upper and lower extremities  Skin: No rashes    RAD:   < from: CT Angio Chest PE Protocol w/ IV Cont (23 @ 17:19) >  IMPRESSION: No pulmonary embolus is noted.  Findings suggestive of left upper lobe pneumonia.  Extensive bronchiectasis within both lungs.    LABS: All Labs Reviewed:          Fever. CHARLY pneumonia. 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 Leukocytosis (improving)  - concern for possible MTB with clinical presentation and radiological findings - airborne precautions - sputum afb (-) x 2 f/u 3rd sputum - if negative dc airborne precautions  - on IV cefepime 2gmq8h #4  - on doxycycline 100mg BID #4  - continue with antibiotic coverage   - f/u cultures, sputum cx nl mike, legionella ag,(-)  - was having work up prior to arrival for cystic fibrosis per pts friend - has been sick and had previous pulmonary illness as child as well   - may require bronchoscopy - as outpt  - HIV test (-)  - on dc po ceftin 500mg BID 7-10 day course, doxycycline 100mg BID x 7 days total   - tolerating abx well so far; no side effects noted  - reason for abx use and side effects reviewed with patient  - D/C IN THE AM    # Sinus tachycardia likely due to pneumonia.  - EK bpm in sinus tachycardia.  - Will get TSH level.  -  - add IVFs     # Hypochromic microcytic  anemia.  - Hb11.6, MCV 74, RDW 16.  - Will order Iron panel, Ferritin, B12 and Folate levels.  - Start IV Venofer   -  - cont IV Venofer     # Acute hyperglycemia, likely reactive.  - .  - Will get A1c level.      # VTE Px: Lovenox sq daily.

## 2023-11-21 NOTE — PROGRESS NOTE ADULT - ASSESSMENT
33y Female no significant PMH admitted 11/17/23 with c/o worsening left chest pain found to have CHARLY PNA and bronchiectasis. Called for broncoscopy.    AFB x2 negative, awaiting third sample sent this AM  Dr. Stein planning for bronchoscopy Tuesday 11/28  cont ABX and therapy as per medical teams  IS  D/W Dr. Stein

## 2023-11-21 NOTE — PROGRESS NOTE ADULT - SUBJECTIVE AND OBJECTIVE BOX
Subjective:    pat better, loose cough, no fever, lying in bed.    Home Medications:  Albuterol (Eqv-ProAir HFA) 90 mcg/inh inhalation aerosol: 2 puff(s) inhaled every 6 hours (17 Nov 2023 20:15)  albuterol 2.5 mg/3 mL (0.083%) inhalation solution: 3 milliliter(s) by nebulizer 4 times a day (17 Nov 2023 20:15)  folic acid 1 mg oral tablet: 1 tab(s) orally once a day (17 Nov 2023 20:15)  naproxen 500 mg oral tablet: 1 tab(s) orally once a day as needed for (17 Nov 2023 20:15)    MEDICATIONS  (STANDING):  acetaminophen   IVPB .. 675 milliGRAM(s) IV Intermittent once  cefepime  Injectable. 2000 milliGRAM(s) IV Push every 8 hours  enoxaparin Injectable 40 milliGRAM(s) SubCutaneous every 24 hours  folic acid 1 milliGRAM(s) Oral daily  hydrocodone/homatropine Syrup 5 milliLiter(s) Oral every 8 hours  sodium chloride 0.9%. 1000 milliLiter(s) (100 mL/Hr) IV Continuous <Continuous>    MEDICATIONS  (PRN):  acetaminophen     Tablet .. 650 milliGRAM(s) Oral every 6 hours PRN Temp greater or equal to 38C (100.4F), Mild Pain (1 - 3)  albuterol    90 MICROgram(s) HFA Inhaler 2 Puff(s) Inhalation every 6 hours PRN Shortness of Breath and/or Wheezing  aluminum hydroxide/magnesium hydroxide/simethicone Suspension 30 milliLiter(s) Oral every 4 hours PRN Dyspepsia  guaiFENesin  milliGRAM(s) Oral every 12 hours PRN Cough  melatonin 3 milliGRAM(s) Oral at bedtime PRN Insomnia  morphine  - Injectable 2 milliGRAM(s) IV Push every 8 hours PRN Moderate Pain (4 - 6)  ondansetron Injectable 4 milliGRAM(s) IV Push every 6 hours PRN Nausea and/or Vomiting      Allergies    No Known Allergies    Intolerances        Vital Signs Last 24 Hrs  T(C): 36.4 (21 Nov 2023 15:54), Max: 37.1 (21 Nov 2023 08:17)  T(F): 97.6 (21 Nov 2023 15:54), Max: 98.8 (21 Nov 2023 08:17)  HR: 105 (21 Nov 2023 15:54) (98 - 112)  BP: 109/67 (21 Nov 2023 15:54) (98/53 - 110/56)  BP(mean): --  RR: 19 (21 Nov 2023 15:54) (18 - 19)  SpO2: 98% (21 Nov 2023 15:54) (93% - 98%)    Parameters below as of 21 Nov 2023 15:54  Patient On (Oxygen Delivery Method): room air          PHYSICAL EXAMINATION:    NECK:  Supple. No lymphadenopathy. Jugular venous pressure not elevated. Carotids equal.   HEART:   The cardiac impulse has a normal quality. Reg., Nl S1 and S2.  There are no murmurs, rubs or gallops noted  CHEST:  Chest rhonchi to auscultation. Normal respiratory effort.  ABDOMEN:  Soft and nontender.   EXTREMITIES:  There is no edema.       LABS:                        9.7    12.80 )-----------( 412      ( 21 Nov 2023 07:11 )             29.4     11-21    138  |  104  |  12  ----------------------------<  115<H>  3.9   |  28  |  0.54    Ca    9.4      21 Nov 2023 07:11  Mg     2.3     11-21        Urinalysis Basic - ( 21 Nov 2023 07:11 )    Color: x / Appearance: x / SG: x / pH: x  Gluc: 115 mg/dL / Ketone: x  / Bili: x / Urobili: x   Blood: x / Protein: x / Nitrite: x   Leuk Esterase: x / RBC: x / WBC x   Sq Epi: x / Non Sq Epi: x / Bacteria: x

## 2023-11-22 LAB
ANION GAP SERPL CALC-SCNC: 8 MMOL/L — SIGNIFICANT CHANGE UP (ref 5–17)
ANION GAP SERPL CALC-SCNC: 8 MMOL/L — SIGNIFICANT CHANGE UP (ref 5–17)
BUN SERPL-MCNC: 14 MG/DL — SIGNIFICANT CHANGE UP (ref 7–23)
BUN SERPL-MCNC: 14 MG/DL — SIGNIFICANT CHANGE UP (ref 7–23)
CALCIUM SERPL-MCNC: 9.3 MG/DL — SIGNIFICANT CHANGE UP (ref 8.5–10.1)
CALCIUM SERPL-MCNC: 9.3 MG/DL — SIGNIFICANT CHANGE UP (ref 8.5–10.1)
CHLORIDE SERPL-SCNC: 102 MMOL/L — SIGNIFICANT CHANGE UP (ref 96–108)
CHLORIDE SERPL-SCNC: 102 MMOL/L — SIGNIFICANT CHANGE UP (ref 96–108)
CO2 SERPL-SCNC: 25 MMOL/L — SIGNIFICANT CHANGE UP (ref 22–31)
CO2 SERPL-SCNC: 25 MMOL/L — SIGNIFICANT CHANGE UP (ref 22–31)
CREAT SERPL-MCNC: 0.55 MG/DL — SIGNIFICANT CHANGE UP (ref 0.5–1.3)
CREAT SERPL-MCNC: 0.55 MG/DL — SIGNIFICANT CHANGE UP (ref 0.5–1.3)
EGFR: 126 ML/MIN/1.73M2 — SIGNIFICANT CHANGE UP
EGFR: 126 ML/MIN/1.73M2 — SIGNIFICANT CHANGE UP
GLUCOSE SERPL-MCNC: 94 MG/DL — SIGNIFICANT CHANGE UP (ref 70–99)
GLUCOSE SERPL-MCNC: 94 MG/DL — SIGNIFICANT CHANGE UP (ref 70–99)
HCT VFR BLD CALC: 31.6 % — LOW (ref 34.5–45)
HCT VFR BLD CALC: 31.6 % — LOW (ref 34.5–45)
HGB BLD-MCNC: 10.2 G/DL — LOW (ref 11.5–15.5)
HGB BLD-MCNC: 10.2 G/DL — LOW (ref 11.5–15.5)
MCHC RBC-ENTMCNC: 24.4 PG — LOW (ref 27–34)
MCHC RBC-ENTMCNC: 24.4 PG — LOW (ref 27–34)
MCHC RBC-ENTMCNC: 32.3 GM/DL — SIGNIFICANT CHANGE UP (ref 32–36)
MCHC RBC-ENTMCNC: 32.3 GM/DL — SIGNIFICANT CHANGE UP (ref 32–36)
MCV RBC AUTO: 75.6 FL — LOW (ref 80–100)
MCV RBC AUTO: 75.6 FL — LOW (ref 80–100)
PLATELET # BLD AUTO: 519 K/UL — HIGH (ref 150–400)
PLATELET # BLD AUTO: 519 K/UL — HIGH (ref 150–400)
POTASSIUM SERPL-MCNC: 4.2 MMOL/L — SIGNIFICANT CHANGE UP (ref 3.5–5.3)
POTASSIUM SERPL-MCNC: 4.2 MMOL/L — SIGNIFICANT CHANGE UP (ref 3.5–5.3)
POTASSIUM SERPL-SCNC: 4.2 MMOL/L — SIGNIFICANT CHANGE UP (ref 3.5–5.3)
POTASSIUM SERPL-SCNC: 4.2 MMOL/L — SIGNIFICANT CHANGE UP (ref 3.5–5.3)
RBC # BLD: 4.18 M/UL — SIGNIFICANT CHANGE UP (ref 3.8–5.2)
RBC # BLD: 4.18 M/UL — SIGNIFICANT CHANGE UP (ref 3.8–5.2)
RBC # FLD: 16.3 % — HIGH (ref 10.3–14.5)
RBC # FLD: 16.3 % — HIGH (ref 10.3–14.5)
SODIUM SERPL-SCNC: 135 MMOL/L — SIGNIFICANT CHANGE UP (ref 135–145)
SODIUM SERPL-SCNC: 135 MMOL/L — SIGNIFICANT CHANGE UP (ref 135–145)
WBC # BLD: 14.21 K/UL — HIGH (ref 3.8–10.5)
WBC # BLD: 14.21 K/UL — HIGH (ref 3.8–10.5)
WBC # FLD AUTO: 14.21 K/UL — HIGH (ref 3.8–10.5)
WBC # FLD AUTO: 14.21 K/UL — HIGH (ref 3.8–10.5)

## 2023-11-22 PROCEDURE — 99232 SBSQ HOSP IP/OBS MODERATE 35: CPT

## 2023-11-22 PROCEDURE — 71045 X-RAY EXAM CHEST 1 VIEW: CPT | Mod: 26

## 2023-11-22 RX ORDER — BUDESONIDE AND FORMOTEROL FUMARATE DIHYDRATE 160; 4.5 UG/1; UG/1
2 AEROSOL RESPIRATORY (INHALATION)
Refills: 0 | Status: DISCONTINUED | OUTPATIENT
Start: 2023-11-22 | End: 2023-11-30

## 2023-11-22 RX ORDER — ALBUTEROL 90 UG/1
2 AEROSOL, METERED ORAL EVERY 6 HOURS
Refills: 0 | Status: DISCONTINUED | OUTPATIENT
Start: 2023-11-22 | End: 2023-11-30

## 2023-11-22 RX ADMIN — CEFEPIME 2000 MILLIGRAM(S): 1 INJECTION, POWDER, FOR SOLUTION INTRAMUSCULAR; INTRAVENOUS at 21:09

## 2023-11-22 RX ADMIN — ENOXAPARIN SODIUM 40 MILLIGRAM(S): 100 INJECTION SUBCUTANEOUS at 18:00

## 2023-11-22 RX ADMIN — CEFEPIME 2000 MILLIGRAM(S): 1 INJECTION, POWDER, FOR SOLUTION INTRAMUSCULAR; INTRAVENOUS at 14:47

## 2023-11-22 RX ADMIN — ALBUTEROL 2 PUFF(S): 90 AEROSOL, METERED ORAL at 20:50

## 2023-11-22 RX ADMIN — Medication 650 MILLIGRAM(S): at 10:51

## 2023-11-22 RX ADMIN — Medication 1 MILLIGRAM(S): at 10:52

## 2023-11-22 RX ADMIN — CEFEPIME 2000 MILLIGRAM(S): 1 INJECTION, POWDER, FOR SOLUTION INTRAMUSCULAR; INTRAVENOUS at 05:43

## 2023-11-22 RX ADMIN — Medication 40 MILLIGRAM(S): at 21:09

## 2023-11-22 RX ADMIN — Medication 600 MILLIGRAM(S): at 10:52

## 2023-11-22 NOTE — PROGRESS NOTE ADULT - SUBJECTIVE AND OBJECTIVE BOX
Patient is a 33y Female with no significant PMH presented in ED with c/o worsening left chest pain since yesterday.  Her chest pain is mainly located in left upper chest.  She also has on and off fever, cough and chills for last couple weeks. Her appetite is also low, and has lost about 4-5 kg of body weight in last 5-6 months.   She denies any night sweats or diaphoresis. As per her , she has some lung problems going on for last few years while she was in [place]. [place] has recently come to [place] about [place] ago.  She denies any personal or family history of tuberculosis.     More congested today. Comfortable. discussed with Dr. Patel and Cardiothoracic needs bronch on monday /  tuesday.     REVIEW OF SYSTEMS:  General: NAD, hemodynamically stable, (-)  fever, (-) chills, (-) weakness  HEENT:  Eyes:  No visual loss, blurred vision, double vision or yellow sclerae. Ears, Nose, Throat:  No hearing loss, sneezing, congestion, runny nose or sore throat.  SKIN:  No rash or itching.  CARDIOVASCULAR:  No chest pain, chest pressure or chest discomfort. No palpitations or edema.  RESPIRATORY:  No shortness of breath, cough or sputum.  GASTROINTESTINAL:  No anorexia, nausea, vomiting or diarrhea. No abdominal pain or blood.  NEUROLOGICAL:  No headache, dizziness, syncope, paralysis, ataxia, numbness or tingling in the extremities. No change in bowel or bladder control.  MUSCULOSKELETAL:  No muscle, back pain, joint pain or stiffness.  HEMATOLOGIC:  No anemia, bleeding or bruising.  LYMPHATICS:  No enlarged nodes. No history of splenectomy.  ENDOCRINOLOGIC:  No reports of sweating, cold or heat intolerance. No polyuria or polydipsia.  ALLERGIES:  No history of asthma, hives, eczema or rhinitis.    PE  Constitutional: NAD, awake and alert  HEENT: PERR, EOMI  Neck: Soft and supple,  No JVD  Respiratory: Breath sounds are clear bilaterally, No wheezing, rales or rhonchi  Cardiovascular: S1 and S2, regular rate and rhythm, no Murmurs  Gastrointestinal: Bowel Sounds present, soft, nontender, nondistended  Extremities: No peripheral edema  Vascular: 2+ peripheral pulses  Neurological: A/O x 3, no focal deficits  Musculoskeletal: 5/5 strength b/l upper and lower extremities  Skin: No rashes    RAD:   < from: CT Angio Chest PE Protocol w/ IV Cont (23 @ 17:19) >  IMPRESSION: No pulmonary embolus is noted.  Findings suggestive of left upper lobe pneumonia.  Extensive bronchiectasis within both lungs.    LABS: All Labs Reviewed:          Fever. CHARLY pneumonia. 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 Leukocytosis (improving)  - concern for possible MTB with clinical presentation and radiological findings - airborne precautions - sputum afb (-) x 2 f/u 3rd sputum - if negative dc airborne precautions  - on IV cefepime 2gmq8h   - Follow cultures/ Sputum AFB-neg  - Serum serology-aspergillus, alph1, antitrypsin, & VEGF-D testing  - Ct surgery fu  - Trial of steroids with persistent symptoms  - was having work up prior to arrival for cystic fibrosis per pts friend - has been sick and had previous pulmonary illness as child as well   - may require bronchoscopy - as outpt  - HIV test (-)  - on dc po ceftin 500mg BID 7-10 day course, doxycycline 100mg BID x 7 days total   - tolerating abx well so far; no side effects noted  - bronchoscopy on monday /  tuesday    # Sinus tachycardia likely due to pneumonia.  - EK bpm in sinus tachycardia.  - Will get TSH level.  -  - add IVFs     # Hypochromic microcytic  anemia.  - Hb11.6, MCV 74, RDW 16.  - Will order Iron panel, Ferritin, B12 and Folate levels.  - Start IV Venofer   -  - cont IV Venofer     # Acute hyperglycemia, likely reactive.  - .  - Will get A1c level.      # VTE Px: Lovenox sq daily.

## 2023-11-22 NOTE — PROGRESS NOTE ADULT - ASSESSMENT
33y Female no significant PMH admitted 11/17/23 with c/o worsening left chest pain found to have CHARLY PNA and bronchiectasis. Called for bronchoscopy AFB x 3 negative.     CXR today for pleuritic chest pain  Plan for Bronchoscopy with Dr. Stein Tuesday   Will pre-op Monday   Cont ABX and therapy as per medical teams  Pulm following   Incentive spirometry   D/W Dr. Stein

## 2023-11-22 NOTE — PROGRESS NOTE ADULT - ASSESSMENT
33y Female with no significant PMH presented in ED with c/o worsening left chest pain since yesterday.  Her chest pain is mainly located in left upper chest.  She also has on and off fever, cough and chills for last couple weeks. Her appetite is also low, and has lost about 4-5 kg of body weight in last 5-6 months.   She denies any night sweats or diaphoresis. As per her , she has some lung problems going on for last few years while she was in [place]. [place] has recently come to [place] about [place] ago.  She denies any personal or family history of tuberculosis.     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 Diagnostic considerations include cystic fibrosis, Lucho-Coleman syndrome, allergic bronchopulmonary aspergillosis (ABPA).  Concern for possible MTB with clinical presentation and radiological findings - airborne precautions and check  Sinus tachycardia likely due to pneumonia  Hypochromic microcytic  anemia    PLAN:    pulmonary unchanged  Follow cultures/ Sputum AFB-neg  Serum serology-aspergillus, alph1, antitrypsin, & VEGF-D testing  Ct surgery fu  Trial of steroids with persistant symptoms  IV cefepime  Albuterol prn  Robitussin prn  TSH level  spoke to pat &  in Romansh/ spoke to Dr Alberto-friend Doctor  DVT prophylaxis: Lovenox sq daily

## 2023-11-22 NOTE — PROGRESS NOTE ADULT - SUBJECTIVE AND OBJECTIVE BOX
Subjective: Patient c/o chest pain radiating to back with inspiration. Nonproductive cough. Afebrile. AFB sputum x 3 negative. Denies chills, SOB, palpitations, hemoptysis. Saturating well on RA.    Vital Signs:  Vital Signs Last 24 Hrs  T(C): 37.3 (11-22-23 @ 08:11), Max: 37.3 (11-22-23 @ 08:11)  T(F): 99.1 (11-22-23 @ 08:11), Max: 99.1 (11-22-23 @ 08:11)  HR: 110 (11-22-23 @ 08:11) (105 - 112)  BP: 95/58 (11-22-23 @ 08:11) (95/58 - 109/67)  RR: 18 (11-22-23 @ 08:11) (18 - 19)  SpO2: 91% (11-22-23 @ 08:11) (91% - 98%) on (O2)        General:NAD  Neurology: Awake, nonfocal, RODRIGUEZ x 4  Eyes: Scleras clear, EOMI, Gross vision intact  ENT: Gross hearing intact, grossly patent pharynx, no stridor  Neck: Neck supple, trachea midline, No JVD  Respiratory: B/L Wheeze  CV: RRR, S1S2, no murmurs  Abdominal: Soft      Relevant labs, radiology and Medications reviewed                        10.2 14.21 )-----------( 519      ( 22 Nov 2023 06:54 )             31.6     11-22    135  |  102  |  14  ----------------------------<  94  4.2   |  25  |  0.55    Ca    9.3      22 Nov 2023 06:54  Mg     2.3     11-21        MEDICATIONS  (STANDING):  acetaminophen   IVPB .. 675 milliGRAM(s) IV Intermittent once  cefepime  Injectable. 2000 milliGRAM(s) IV Push every 8 hours  enoxaparin Injectable 40 milliGRAM(s) SubCutaneous every 24 hours  folic acid 1 milliGRAM(s) Oral daily  hydrocodone/homatropine Syrup 5 milliLiter(s) Oral every 8 hours  sodium chloride 0.9%. 1000 milliLiter(s) (100 mL/Hr) IV Continuous <Continuous>    MEDICATIONS  (PRN):  acetaminophen     Tablet .. 650 milliGRAM(s) Oral every 6 hours PRN Temp greater or equal to 38C (100.4F), Mild Pain (1 - 3)  albuterol    90 MICROgram(s) HFA Inhaler 2 Puff(s) Inhalation every 6 hours PRN Shortness of Breath and/or Wheezing  aluminum hydroxide/magnesium hydroxide/simethicone Suspension 30 milliLiter(s) Oral every 4 hours PRN Dyspepsia  guaiFENesin  milliGRAM(s) Oral every 12 hours PRN Cough  melatonin 3 milliGRAM(s) Oral at bedtime PRN Insomnia  morphine  - Injectable 2 milliGRAM(s) IV Push every 8 hours PRN Moderate Pain (4 - 6)  ondansetron Injectable 4 milliGRAM(s) IV Push every 6 hours PRN Nausea and/or Vomiting        Assessment  33y Female  w/ PAST MEDICAL & SURGICAL HISTORY:  History of lung disease      Pneumonia      No pertinent past surgical history      admitted with complaints of Patient is a 33y old  Female who presents with a chief complaint of CHARLY pneumonia and B/L bronchiectasis (21 Nov 2023 16:19)

## 2023-11-22 NOTE — PROGRESS NOTE ADULT - SUBJECTIVE AND OBJECTIVE BOX
Subjective:    pat still cough with some congestion, lying in bed.    Home Medications:  Albuterol (Eqv-ProAir HFA) 90 mcg/inh inhalation aerosol: 2 puff(s) inhaled every 6 hours (17 Nov 2023 20:15)  albuterol 2.5 mg/3 mL (0.083%) inhalation solution: 3 milliliter(s) by nebulizer 4 times a day (17 Nov 2023 20:15)  folic acid 1 mg oral tablet: 1 tab(s) orally once a day (17 Nov 2023 20:15)  naproxen 500 mg oral tablet: 1 tab(s) orally once a day as needed for (17 Nov 2023 20:15)    MEDICATIONS  (STANDING):  acetaminophen   IVPB .. 675 milliGRAM(s) IV Intermittent once  albuterol    90 MICROgram(s) HFA Inhaler 2 Puff(s) Inhalation every 6 hours  budesonide 160 MICROgram(s)/formoterol 4.5 MICROgram(s) Inhaler 2 Puff(s) Inhalation two times a day  cefepime  Injectable. 2000 milliGRAM(s) IV Push every 8 hours  enoxaparin Injectable 40 milliGRAM(s) SubCutaneous every 24 hours  folic acid 1 milliGRAM(s) Oral daily  hydrocodone/homatropine Syrup 5 milliLiter(s) Oral every 8 hours  sodium chloride 0.9%. 1000 milliLiter(s) (100 mL/Hr) IV Continuous <Continuous>    MEDICATIONS  (PRN):  acetaminophen     Tablet .. 650 milliGRAM(s) Oral every 6 hours PRN Temp greater or equal to 38C (100.4F), Mild Pain (1 - 3)  albuterol    90 MICROgram(s) HFA Inhaler 2 Puff(s) Inhalation every 6 hours PRN Shortness of Breath and/or Wheezing  aluminum hydroxide/magnesium hydroxide/simethicone Suspension 30 milliLiter(s) Oral every 4 hours PRN Dyspepsia  guaiFENesin  milliGRAM(s) Oral every 12 hours PRN Cough  melatonin 3 milliGRAM(s) Oral at bedtime PRN Insomnia  morphine  - Injectable 2 milliGRAM(s) IV Push every 8 hours PRN Moderate Pain (4 - 6)  ondansetron Injectable 4 milliGRAM(s) IV Push every 6 hours PRN Nausea and/or Vomiting      Allergies    No Known Allergies    Intolerances        Vital Signs Last 24 Hrs  T(C): 37.3 (22 Nov 2023 08:11), Max: 37.3 (22 Nov 2023 08:11)  T(F): 99.1 (22 Nov 2023 08:11), Max: 99.1 (22 Nov 2023 08:11)  HR: 110 (22 Nov 2023 08:11) (105 - 112)  BP: 95/58 (22 Nov 2023 08:11) (95/58 - 109/67)  BP(mean): --  RR: 18 (22 Nov 2023 08:11) (18 - 19)  SpO2: 91% (22 Nov 2023 08:11) (91% - 98%)    Parameters below as of 22 Nov 2023 08:11  Patient On (Oxygen Delivery Method): room air          PHYSICAL EXAMINATION:    NECK:  Supple. No lymphadenopathy. Jugular venous pressure not elevated. Carotids equal.   HEART:   The cardiac impulse has a normal quality. Reg., Nl S1 and S2.  There are no murmurs, rubs or gallops noted  CHEST:  Chest crackles to auscultation. Normal respiratory effort.  ABDOMEN:  Soft and nontender.   EXTREMITIES:  There is no edema.       LABS:                        10.2   14.21 )-----------( 519      ( 22 Nov 2023 06:54 )             31.6     11-22    135  |  102  |  14  ----------------------------<  94  4.2   |  25  |  0.55    Ca    9.3      22 Nov 2023 06:54  Mg     2.3     11-21        Urinalysis Basic - ( 22 Nov 2023 06:54 )    Color: x / Appearance: x / SG: x / pH: x  Gluc: 94 mg/dL / Ketone: x  / Bili: x / Urobili: x   Blood: x / Protein: x / Nitrite: x   Leuk Esterase: x / RBC: x / WBC x   Sq Epi: x / Non Sq Epi: x / Bacteria: x

## 2023-11-23 LAB
A1AT SERPL-MCNC: 329 MG/DL — HIGH (ref 90–200)
A1AT SERPL-MCNC: 329 MG/DL — HIGH (ref 90–200)
CULTURE RESULTS: SIGNIFICANT CHANGE UP
SPECIMEN SOURCE: SIGNIFICANT CHANGE UP

## 2023-11-23 PROCEDURE — 99232 SBSQ HOSP IP/OBS MODERATE 35: CPT

## 2023-11-23 RX ADMIN — ENOXAPARIN SODIUM 40 MILLIGRAM(S): 100 INJECTION SUBCUTANEOUS at 18:09

## 2023-11-23 RX ADMIN — Medication 1 MILLIGRAM(S): at 09:30

## 2023-11-23 RX ADMIN — ALBUTEROL 2 PUFF(S): 90 AEROSOL, METERED ORAL at 08:27

## 2023-11-23 RX ADMIN — ALBUTEROL 2 PUFF(S): 90 AEROSOL, METERED ORAL at 13:34

## 2023-11-23 RX ADMIN — ALBUTEROL 2 PUFF(S): 90 AEROSOL, METERED ORAL at 01:40

## 2023-11-23 RX ADMIN — Medication 650 MILLIGRAM(S): at 21:12

## 2023-11-23 RX ADMIN — ALBUTEROL 2 PUFF(S): 90 AEROSOL, METERED ORAL at 20:45

## 2023-11-23 RX ADMIN — CEFEPIME 2000 MILLIGRAM(S): 1 INJECTION, POWDER, FOR SOLUTION INTRAMUSCULAR; INTRAVENOUS at 21:08

## 2023-11-23 RX ADMIN — BUDESONIDE AND FORMOTEROL FUMARATE DIHYDRATE 2 PUFF(S): 160; 4.5 AEROSOL RESPIRATORY (INHALATION) at 20:46

## 2023-11-23 RX ADMIN — Medication 40 MILLIGRAM(S): at 13:35

## 2023-11-23 RX ADMIN — BUDESONIDE AND FORMOTEROL FUMARATE DIHYDRATE 2 PUFF(S): 160; 4.5 AEROSOL RESPIRATORY (INHALATION) at 08:27

## 2023-11-23 RX ADMIN — CEFEPIME 2000 MILLIGRAM(S): 1 INJECTION, POWDER, FOR SOLUTION INTRAMUSCULAR; INTRAVENOUS at 13:34

## 2023-11-23 RX ADMIN — CEFEPIME 2000 MILLIGRAM(S): 1 INJECTION, POWDER, FOR SOLUTION INTRAMUSCULAR; INTRAVENOUS at 05:49

## 2023-11-23 NOTE — PROGRESS NOTE ADULT - SUBJECTIVE AND OBJECTIVE BOX
CHEO CHENEY  MRN-[id number]  Patient is a 33y old  Female who presents with a chief complaint of CHARLY pneumonia and B/L bronchiectasis (23 Nov 2023 09:01)    HPI:  Chief Complaint: chest pain.    33y Female no significant PMH admitted 11/17/23 with c/o worsening left chest pain found to have CHARLY PNA and bronchiectasis. Called for bronchoscopy AFB x 3 negative.     Events in last 24 hours:   -pt Gnosticist speaking   -pt was examined and assessed at this bedside this AM SpO2 92 5 on RA   -Pt having some mild pleuritic CP on inspiration at this time, but does not appear to be in resp distress       REVIEW OF SYSTEMS:    Gen: No fever  EYES/ENT: No visual changes;  No vertigo or throat pain   NECK: No pain   RES:  No shortness of breath or Cough  CARD: + mild pleuritic CP on inspiration   GI: No abdominal pain  : No dysuria  NEURO: No weakness  SKIN: No itching, rashes     Physical Exam:  Vital Signs Last 24 Hrs  T(C): 37.3 (23 Nov 2023 08:38), Max: 37.3 (23 Nov 2023 08:38)  T(F): 99.2 (23 Nov 2023 08:38), Max: 99.2 (23 Nov 2023 08:38)  HR: 86 (23 Nov 2023 08:38) (72 - 97)  BP: 90/60 (23 Nov 2023 08:38) (90/60 - 107/68)  BP(mean): --  RR: 18 (23 Nov 2023 08:38) (18 - 18)  SpO2: 92% (23 Nov 2023 08:38) (92% - 96%)    Parameters below as of 23 Nov 2023 08:38  Patient On (Oxygen Delivery Method): room air        Gen:  Awake, alert   CNS: non focal 	A&OX3  Neck: no JVD  RES : clear on R side, coarse BS throughout in left lung with wheeze                   CVS: Regular  rhythm. Normal S1/S2  Abd: Soft, non-distended. Bowel sounds present.  Skin: No rash.  Ext:  no edema    ============================I/O===========================   I&O's Detail    ============================ LABS =========================                        10.2   14.21 )-----------( 519      ( 22 Nov 2023 06:54 )             31.6     11-22    135  |  102  |  14  ----------------------------<  94  4.2   |  25  |  0.55    Ca    9.3      22 Nov 2023 06:54            Urinalysis Basic - ( 22 Nov 2023 06:54 )    Color: x / Appearance: x / SG: x / pH: x  Gluc: 94 mg/dL / Ketone: x  / Bili: x / Urobili: x   Blood: x / Protein: x / Nitrite: x   Leuk Esterase: x / RBC: x / WBC x   Sq Epi: x / Non Sq Epi: x / Bacteria: x      ======================Micro/Rad/Cardio=================  Culture: Culture - Acid Fast - Sputum w/Smear . (11.21.23 @ 05:36)    Specimen Source: .Sputum Sputum   Acid Fast Bacilli Smear:   No acid-fast bacilli seen by fluorochrome stain   Culture Results:   Culture is being performed.    Culture - Acid Fast - Sputum w/Smear . (11.19.23 @ 19:52)    Specimen Source: .Sputum Sputum   Acid Fast Bacilli Smear:   No acid-fast bacilli seen by fluorochrome stain   Culture Results:   Culture is being performed.    Culture - Acid Fast - Sputum w/Smear (11.18.23 @ 17:30)    Specimen Source: .Sputum Sputum   Acid Fast Bacilli Smear:   No acid-fast bacilli seen by fluorochrome stain   Culture Results:   Culture is being performed.       : < from: CT Angio Chest PE Protocol w/ IV Cont (11.17.23 @ 17:19) >  IMPRESSION: No pulmonary embolus is noted.    Findings suggestive of left upper lobe pneumonia.    Extensive bronchiectasis within both lungs.    --- End of Report ---    < end of copied text >    Echo:Reviewed  ======================================================  PAST MEDICAL & SURGICAL HISTORY:  History of lung disease      Pneumonia      No pertinent past surgical history    *This patient requires a moderate level of care due to the complexity and severity of their condition which increases the amount and complexity of data to be reviewed and analyzed in addition to the risk of complications, morbidity and mortality of patient management      ====================ASSESSMENT ==============  1.  CHARLY PNA and bronchiectasis    Plan:  -pt was examined and assessed at this bedside this AM SpO2 92 5 on RA   -Pt having some mild pleuritic CP on inspiration at this time, but does not appear to be in resp distress   -Plan for Bronchoscopy with Dr. Stein Tuesday   -pre-op Monday   -Cont ABX and therapy as per medicine, Pulm following   - Incentive spirometry       PLAN DISCUSSED IN DETAIL WITH ICU ATTENDING DR. Howard WHO IS AGREEABLE TO THE ABOVE PLAN     DATE OF ENTRY OF THIS NOTE IS EQUAL TO THE DATE OF SERVICES RENDERED ****      Time spent on this patient encounter, which includes documenting this note in the electronic medical record, was 45 minutes including assessing the presenting problems with associated risks, reviewing medical records to prepare for the encounter, and meeting face to face with the patient to obtain additional history. I have also performed an appropriate physical exam, made interventions listed and ordered and interpreted appropriate diagnostic studies as documented. To improve communication and patient safety, I have coordinated care with the multidisciplinary team including the bedside nurse, appropriate attending of record and consultants as needed.

## 2023-11-23 NOTE — PROGRESS NOTE ADULT - SUBJECTIVE AND OBJECTIVE BOX
CHIEF COMPLAINT/INTERVAL HISTORY:    Patient is a 33y old  Female who presents with a chief complaint of CHARLY pneumonia and B/L bronchiectasis (2023 18:36)      HPI:  Chief Complaint: chest pain.    The patient is a 33y Female with no significant PMH presented in ED with c/o worsening left chest pain since yesterday.  Her chest pain is mainly located in left upper chest.  She also has on and off fever, cough and chills for last couple weeks. Her appetite is also low, and has lost about 4-5 kg of body weight in last 5-6 months.   She denies any night sweats or diaphoresis. As per her , she has some lung problems going on for last few years while she was in [place]. [place] has recently come to [place] about 6 months ago.  She denies any personal or family history of tuberculosis.   Upon arrival in ED, she was tachycardic with HR in 140s but afebrile. She is saturating well on RA.   Sig labs: WBC 22.12k, N 86%, Lactate 1.8,  D-dimer 480,  Hb11.6, MCV 74, RDW 16, . Lipase 14 and Mg 2.1.  CXR CHARLY consolidation.  CT angio chest: Negative for PE, but shows CHARLY pneumonia and B/L extensive bronchiectasis.  EK bpm in sinus tachycardia.    NS 2 L bolus and Levaquin 740 mg IV given in ED.   (2023 19:53)      SUBJECTIVE & OBJECTIVE: Pt seen and examined at bedside.     ICU Vital Signs Last 24 Hrs  T(C): 37.3 (2023 08:38), Max: 37.3 (2023 08:38)  T(F): 99.2 (2023 08:38), Max: 99.2 (2023 08:38)  HR: 86 (2023 08:38) (72 - 97)  BP: 90/60 (2023 08:38) (90/60 - 107/68)  BP(mean): --  ABP: --  ABP(mean): --  RR: 18 (2023 08:38) (18 - 18)  SpO2: 92% (2023 08:38) (92% - 96%)    O2 Parameters below as of 2023 08:38  Patient On (Oxygen Delivery Method): room air              MEDICATIONS  (STANDING):  acetaminophen   IVPB .. 675 milliGRAM(s) IV Intermittent once  albuterol    90 MICROgram(s) HFA Inhaler 2 Puff(s) Inhalation every 6 hours  budesonide 160 MICROgram(s)/formoterol 4.5 MICROgram(s) Inhaler 2 Puff(s) Inhalation two times a day  cefepime  Injectable. 2000 milliGRAM(s) IV Push every 8 hours  enoxaparin Injectable 40 milliGRAM(s) SubCutaneous every 24 hours  folic acid 1 milliGRAM(s) Oral daily  hydrocodone/homatropine Syrup 5 milliLiter(s) Oral every 8 hours  methylPREDNISolone sodium succinate Injectable 40 milliGRAM(s) IV Push every 12 hours  sodium chloride 0.9%. 1000 milliLiter(s) (100 mL/Hr) IV Continuous <Continuous>    MEDICATIONS  (PRN):  acetaminophen     Tablet .. 650 milliGRAM(s) Oral every 6 hours PRN Temp greater or equal to 38C (100.4F), Mild Pain (1 - 3)  albuterol    90 MICROgram(s) HFA Inhaler 2 Puff(s) Inhalation every 6 hours PRN Shortness of Breath and/or Wheezing  aluminum hydroxide/magnesium hydroxide/simethicone Suspension 30 milliLiter(s) Oral every 4 hours PRN Dyspepsia  guaiFENesin  milliGRAM(s) Oral every 12 hours PRN Cough  melatonin 3 milliGRAM(s) Oral at bedtime PRN Insomnia  morphine  - Injectable 2 milliGRAM(s) IV Push every 8 hours PRN Moderate Pain (4 - 6)  ondansetron Injectable 4 milliGRAM(s) IV Push every 6 hours PRN Nausea and/or Vomiting        PHYSICAL EXAM:      NERVOUS SYSTEM:  Alert & Oriented X3, Motor Strength 5/5 B/L upper and lower extremities; DTRs 2+ intact and symmetric  CHEST/LUNG: Clear to auscultation bilaterally; No rales, rhonchi, wheezing, or rubs  HEART: Regular rate and rhythm; No murmurs, rubs, or gallops  ABDOMEN: Soft, Nontender, Nondistended; Bowel sounds present  EXTREMITIES:  2+ Peripheral Pulses, No clubbing, cyanosis, or edema    LABS:                        10.2   14.21 )-----------( 519      ( 2023 06:54 )             31.6     -    135  |  102  |  14  ----------------------------<  94  4.2   |  25  |  0.55    Ca    9.3      2023 06:54        Urinalysis Basic - ( 2023 06:54 )    Color: x / Appearance: x / SG: x / pH: x  Gluc: 94 mg/dL / Ketone: x  / Bili: x / Urobili: x   Blood: x / Protein: x / Nitrite: x   Leuk Esterase: x / RBC: x / WBC x   Sq Epi: x / Non Sq Epi: x / Bacteria: x        CAPILLARY BLOOD GLUCOSE          RECENT CULTURES:      RADIOLOGY & ADDITIONAL TESTS: personally reviewed    < from: CT Angio Chest PE Protocol w/ IV Cont (23 @ 17:19) >  No endobronchial lesions are noted. Extensive bronchiectasis is noted   within both lungs. Some of the dilated bronchi are filled with   mucus/secretions. Fairly large consolidation is noted in the left upper   lobe. It is new when compared to previous exam. No pleural effusions are   noted.    Below the diaphragm, visualized portions of the abdomen are unremarkable.    Visualized osseous structures are within normal limits.    IMPRESSION: No pulmonary embolus is noted.    Findings suggestive of left upper lobe pneumonia.    Extensive bronchiectasis within both lungs.            Fever. CHARLY pneumonia. 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 Leukocytosis (improving)  - concern for possible MTB with clinical presentation and radiological findings - airborne precautions - sputum afb (-) x 2 f/u 3rd sputum - if negative dc airborne precautions  - on IV cefepime 2gmq8h   - Follow cultures/ Sputum AFB-neg  - Serum serology-aspergillus, alph1, antitrypsin, & VEGF-D testing  - Ct surgery fu  - Trial of steroids with persistent symptoms  - was having work up prior to arrival for cystic fibrosis per pts friend - has been sick and had previous pulmonary illness as child as well   - may require bronchoscopy - as outpt  - HIV test (-)  - on dc po ceftin 500mg BID 7-10 day course, doxycycline 100mg BID x 7 days total   - tolerating abx well so far; no side effects noted  - bronchoscopy on monday /  tuesday          # VTE Px: Lovenox sq daily.

## 2023-11-23 NOTE — PROGRESS NOTE ADULT - ASSESSMENT
33y Female with no significant PMH presented in ED with c/o worsening left chest pain since yesterday.  Her chest pain is mainly located in left upper chest.  She also has on and off fever, cough and chills for last couple weeks. Her appetite is also low, and has lost about 4-5 kg of body weight in last 5-6 months.   She denies any night sweats or diaphoresis. As per her , she has some lung problems going on for last few years while she was in [place]. She has recently come to [place] about 6 [place] ago.  She denies any personal or family history of tuberculosis.     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 Diagnostic considerations include cystic fibrosis, Lucho-Coleman syndrome, allergic bronchopulmonary aspergillosis (ABPA).  Concern for possible MTB with clinical presentation and radiological findings - airborne precautions and check  Sinus tachycardia likely due to pneumonia  Hypochromic microcytic  anemia    PLAN:    pulmonary unchanged  Follow cultures/ Sputum AFB-neg  Serum serology-aspergillus, alph1, antitrypsin, & VEGF-D testing  Ct surgery fu  Po predniosone  Po ceftin  Albuterol prn  Robitussin prn  TSH level  spoke to pat &  in Kisw[place]ili/ spoke to Dr Alberto-friend Doctor  DVT prophylaxis: Lovenox sq daily

## 2023-11-23 NOTE — PROGRESS NOTE ADULT - SUBJECTIVE AND OBJECTIVE BOX
Subjective:    pat better, off isolation, cough better, no respiratory distress.    Home Medications:  Albuterol (Eqv-ProAir HFA) 90 mcg/inh inhalation aerosol: 2 puff(s) inhaled every 6 hours (17 Nov 2023 20:15)  albuterol 2.5 mg/3 mL (0.083%) inhalation solution: 3 milliliter(s) by nebulizer 4 times a day (17 Nov 2023 20:15)  folic acid 1 mg oral tablet: 1 tab(s) orally once a day (17 Nov 2023 20:15)  naproxen 500 mg oral tablet: 1 tab(s) orally once a day as needed for (17 Nov 2023 20:15)    MEDICATIONS  (STANDING):  acetaminophen   IVPB .. 675 milliGRAM(s) IV Intermittent once  albuterol    90 MICROgram(s) HFA Inhaler 2 Puff(s) Inhalation every 6 hours  budesonide 160 MICROgram(s)/formoterol 4.5 MICROgram(s) Inhaler 2 Puff(s) Inhalation two times a day  cefepime  Injectable. 2000 milliGRAM(s) IV Push every 8 hours  enoxaparin Injectable 40 milliGRAM(s) SubCutaneous every 24 hours  folic acid 1 milliGRAM(s) Oral daily  hydrocodone/homatropine Syrup 5 milliLiter(s) Oral every 8 hours    MEDICATIONS  (PRN):  acetaminophen     Tablet .. 650 milliGRAM(s) Oral every 6 hours PRN Temp greater or equal to 38C (100.4F), Mild Pain (1 - 3)  albuterol    90 MICROgram(s) HFA Inhaler 2 Puff(s) Inhalation every 6 hours PRN Shortness of Breath and/or Wheezing  aluminum hydroxide/magnesium hydroxide/simethicone Suspension 30 milliLiter(s) Oral every 4 hours PRN Dyspepsia  guaiFENesin  milliGRAM(s) Oral every 12 hours PRN Cough  melatonin 3 milliGRAM(s) Oral at bedtime PRN Insomnia  morphine  - Injectable 2 milliGRAM(s) IV Push every 8 hours PRN Moderate Pain (4 - 6)  ondansetron Injectable 4 milliGRAM(s) IV Push every 6 hours PRN Nausea and/or Vomiting      Allergies    No Known Allergies    Intolerances        Vital Signs Last 24 Hrs  T(C): 37.3 (23 Nov 2023 08:38), Max: 37.3 (23 Nov 2023 08:38)  T(F): 99.2 (23 Nov 2023 08:38), Max: 99.2 (23 Nov 2023 08:38)  HR: 86 (23 Nov 2023 08:38) (72 - 97)  BP: 90/60 (23 Nov 2023 08:38) (90/60 - 107/68)  BP(mean): --  RR: 18 (23 Nov 2023 08:38) (18 - 18)  SpO2: 92% (23 Nov 2023 08:38) (92% - 96%)    Parameters below as of 23 Nov 2023 08:38  Patient On (Oxygen Delivery Method): room air          PHYSICAL EXAMINATION:    NECK:  Supple. No lymphadenopathy. Jugular venous pressure not elevated. Carotids equal.   HEART:   The cardiac impulse has a normal quality. Reg., Nl S1 and S2.  There are no murmurs, rubs or gallops noted  CHEST:  Chest is clear to auscultation. Normal respiratory effort.  ABDOMEN:  Soft and nontender.   EXTREMITIES:  There is no edema.       LABS:                        10.2   14.21 )-----------( 519      ( 22 Nov 2023 06:54 )             31.6     11-22    135  |  102  |  14  ----------------------------<  94  4.2   |  25  |  0.55    Ca    9.3      22 Nov 2023 06:54        Urinalysis Basic - ( 22 Nov 2023 06:54 )    Color: x / Appearance: x / SG: x / pH: x  Gluc: 94 mg/dL / Ketone: x  / Bili: x / Urobili: x   Blood: x / Protein: x / Nitrite: x   Leuk Esterase: x / RBC: x / WBC x   Sq Epi: x / Non Sq Epi: x / Bacteria: x

## 2023-11-24 LAB
CRP SERPL-MCNC: 23 MG/L — HIGH
CRP SERPL-MCNC: 23 MG/L — HIGH
ERYTHROCYTE [SEDIMENTATION RATE] IN BLOOD: 114 MM/HR — HIGH (ref 0–15)
ERYTHROCYTE [SEDIMENTATION RATE] IN BLOOD: 114 MM/HR — HIGH (ref 0–15)
IGA FLD-MCNC: 351 MG/DL — SIGNIFICANT CHANGE UP (ref 84–499)
IGA FLD-MCNC: 351 MG/DL — SIGNIFICANT CHANGE UP (ref 84–499)
IGG FLD-MCNC: 1361 MG/DL — SIGNIFICANT CHANGE UP (ref 610–1660)
IGG FLD-MCNC: 1361 MG/DL — SIGNIFICANT CHANGE UP (ref 610–1660)
IGM SERPL-MCNC: 141 MG/DL — SIGNIFICANT CHANGE UP (ref 35–242)
IGM SERPL-MCNC: 141 MG/DL — SIGNIFICANT CHANGE UP (ref 35–242)
KAPPA LC SER QL IFE: 4.85 MG/DL — HIGH (ref 0.33–1.94)
KAPPA LC SER QL IFE: 4.85 MG/DL — HIGH (ref 0.33–1.94)
KAPPA/LAMBDA FREE LIGHT CHAIN RATIO, SERUM: 1.34 RATIO — SIGNIFICANT CHANGE UP (ref 0.26–1.65)
KAPPA/LAMBDA FREE LIGHT CHAIN RATIO, SERUM: 1.34 RATIO — SIGNIFICANT CHANGE UP (ref 0.26–1.65)
LAMBDA LC SER QL IFE: 3.61 MG/DL — HIGH (ref 0.57–2.63)
LAMBDA LC SER QL IFE: 3.61 MG/DL — HIGH (ref 0.57–2.63)

## 2023-11-24 PROCEDURE — 71250 CT THORAX DX C-: CPT | Mod: 26

## 2023-11-24 PROCEDURE — 99232 SBSQ HOSP IP/OBS MODERATE 35: CPT

## 2023-11-24 RX ADMIN — ALBUTEROL 2 PUFF(S): 90 AEROSOL, METERED ORAL at 13:27

## 2023-11-24 RX ADMIN — CEFEPIME 2000 MILLIGRAM(S): 1 INJECTION, POWDER, FOR SOLUTION INTRAMUSCULAR; INTRAVENOUS at 05:05

## 2023-11-24 RX ADMIN — Medication 650 MILLIGRAM(S): at 18:24

## 2023-11-24 RX ADMIN — Medication 40 MILLIGRAM(S): at 21:41

## 2023-11-24 RX ADMIN — ALBUTEROL 2 PUFF(S): 90 AEROSOL, METERED ORAL at 09:17

## 2023-11-24 RX ADMIN — Medication 40 MILLIGRAM(S): at 09:54

## 2023-11-24 RX ADMIN — ALBUTEROL 2 PUFF(S): 90 AEROSOL, METERED ORAL at 20:29

## 2023-11-24 RX ADMIN — BUDESONIDE AND FORMOTEROL FUMARATE DIHYDRATE 2 PUFF(S): 160; 4.5 AEROSOL RESPIRATORY (INHALATION) at 20:30

## 2023-11-24 RX ADMIN — CEFEPIME 2000 MILLIGRAM(S): 1 INJECTION, POWDER, FOR SOLUTION INTRAMUSCULAR; INTRAVENOUS at 21:40

## 2023-11-24 RX ADMIN — BUDESONIDE AND FORMOTEROL FUMARATE DIHYDRATE 2 PUFF(S): 160; 4.5 AEROSOL RESPIRATORY (INHALATION) at 09:18

## 2023-11-24 RX ADMIN — CEFEPIME 2000 MILLIGRAM(S): 1 INJECTION, POWDER, FOR SOLUTION INTRAMUSCULAR; INTRAVENOUS at 13:26

## 2023-11-24 RX ADMIN — Medication 1 MILLIGRAM(S): at 09:28

## 2023-11-24 RX ADMIN — ENOXAPARIN SODIUM 40 MILLIGRAM(S): 100 INJECTION SUBCUTANEOUS at 18:23

## 2023-11-24 NOTE — PROGRESS NOTE ADULT - SUBJECTIVE AND OBJECTIVE BOX
Date of service: 11-24-23 @ 12:22    Lying in bed in NAD  Has dry cough  Has SOB   Reported with worsening CXR    ROS: no fever or chills; denies dizziness, no HA, no SOB or cough, no abdominal pain, no diarrhea or constipation; no dysuria, no legs pain, no rashes    MEDICATIONS  (STANDING):  acetaminophen   IVPB .. 675 milliGRAM(s) IV Intermittent once  albuterol    90 MICROgram(s) HFA Inhaler 2 Puff(s) Inhalation every 6 hours  budesonide 160 MICROgram(s)/formoterol 4.5 MICROgram(s) Inhaler 2 Puff(s) Inhalation two times a day  cefepime  Injectable. 2000 milliGRAM(s) IV Push every 8 hours  enoxaparin Injectable 40 milliGRAM(s) SubCutaneous every 24 hours  folic acid 1 milliGRAM(s) Oral daily  hydrocodone/homatropine Syrup 5 milliLiter(s) Oral every 8 hours  methylPREDNISolone sodium succinate Injectable 40 milliGRAM(s) IV Push every 12 hours    Vital Signs Last 24 Hrs  T(C): 36.6 (24 Nov 2023 09:16), Max: 36.7 (23 Nov 2023 15:18)  T(F): 97.8 (24 Nov 2023 09:16), Max: 98.1 (23 Nov 2023 15:18)  HR: 102 (24 Nov 2023 09:16) (90 - 102)  BP: 103/64 (24 Nov 2023 09:16) (99/57 - 103/64)  BP(mean): --  RR: 17 (24 Nov 2023 09:16) (17 - 18)  SpO2: 96% (24 Nov 2023 09:16) (93% - 96%)    Parameters below as of 24 Nov 2023 09:16  Patient On (Oxygen Delivery Method): room air     Physical exam:    Constitutional: NAD   HEENT: NC/AT, EOMI, PERRLA, conjunctivae clear; ears and nose atraumatic; pharynx benign  Neck: supple; thyroid not palpable  Back: no tenderness  Respiratory: decreased breath sounds, rhonchi   Cardiovascular: S1S2 regular, no murmurs  Abdomen: soft, not tender, not distended, positive BS; liver and spleen WNL  Genitourinary: no suprapubic tenderness  Lymphatic: no LN palpable  Musculoskeletal: no muscle tenderness, no joint swelling or tenderness  Extremities: no pedal edema  Neurological/ Psychiatric: AxOx3, Judgement and insight normal;  moving all extremities  Skin: no rashes; no palpable lesions    Labs: reviewed    Ferritin: 143 ng/mL (11-18-23 @ 08:03)  D-Dimer Assay, Quantitative: 480 ng/mL DDU (11-17-23 @ 15:26)                                            9.7    12.80 )-----------( 412      ( 21 Nov 2023 07:11 )             29.4     11-21    138  |  104  |  12  ----------------------------<  115<H>  3.9   |  28  |  0.54    Ca    9.4      21 Nov 2023 07:11  Mg     2.3     11-21    LIVER FUNCTIONS - ( 17 Nov 2023 15:26 )  Alb: 3.0 g/dL / Pro: 7.9 gm/dL / ALK PHOS: 105 U/L / ALT: 17 U/L / AST: 15 U/L / GGT: x           Urinalysis Basic - ( 18 Nov 2023 08:03 )    Color: x / Appearance: x / SG: x / pH: x  Gluc: 114 mg/dL / Ketone: x  / Bili: x / Urobili: x   Blood: x / Protein: x / Nitrite: x   Leuk Esterase: x / RBC: x / WBC x   Sq Epi: x / Non Sq Epi: x / Bacteria: x    Culture - Acid Fast - Sputum w/Smear (collected 21 Nov 2023 05:36)  Source: .Sputum Sputum  Preliminary Report (22 Nov 2023 15:09):    Culture is being performed.    Culture - Acid Fast - Sputum w/Smear (collected 19 Nov 2023 19:52)  Source: .Sputum Sputum  Preliminary Report (22 Nov 2023 15:09):    Culture is being performed.    Culture - Acid Fast - Sputum w/Smear (collected 18 Nov 2023 17:30)  Source: .Sputum Sputum  Preliminary Report (22 Nov 2023 15:09):    Culture is being performed.    Culture - Sputum (collected 18 Nov 2023 00:30)  Source: .Sputum Sputum  Gram Stain (18 Nov 2023 13:46):    Moderate polymorphonuclear leukocytes per low power field    Few Squamous epithelial cells per low power field    Moderate Gram Positive Cocci in Pairs and Chains seen per oil power field    Few Gram Negative Rods seen per oil power field    Few Gram Positive Rods seen per oil power field  Final Report (20 Nov 2023 10:34):    Normal Respiratory Terra present    Culture - Blood (collected 17 Nov 2023 17:33)  Source: .Blood None  Final Report (23 Nov 2023 01:00):    No growth at 5 days    Culture - Blood (collected 17 Nov 2023 17:33)  Source: .Blood None  Final Report (23 Nov 2023 01:00):    No growth at 5 days    Radiology: all available radiological tests reviewed  < from: CT Angio Chest PE Protocol w/ IV Cont (11.17.23 @ 17:19) >    ACC: [id number] EXAM:  CT ANGIO CHEST PULM ART [facility]   ORDERED BY: ROSY OWENS     PROCEDURE DATE:  11/17/2023      INTERPRETATION:  Clinical information: Evaluate for pulmonary embolus.   Exam is compared to previous study of 8/23/2023.    CT angiogram of the chest was obtained following administration of   intravenous contrast. Approximately 70 cc of Omnipaque 350 was   administered and 30 cc was discarded. Coronal, sagittal and MIP images   were submitted for review.    Few lymph nodes are present in the subcarinal space, AP window and in the   hilar regions bilaterally.    Heart is normal in size. No pericardial effusion is noted. Pulmonary   arteries are normal in caliber. No filling defects are noted.    No endobronchial lesions are noted. Extensive bronchiectasis is noted   within both lungs. Some of the dilated bronchi are filled with   mucus/secretions. Fairly large consolidation is noted in the left upper   lobe. It is new when compared to previous exam. No pleural effusions are   noted.  Below the diaphragm, visualized portions of the abdomen are unremarkable.  Visualized osseous structures are within normal limits.    IMPRESSION: No pulmonary embolus is noted.  Findings suggestive of left upper lobe pneumonia.  Extensive bronchiectasis within both lungs.    < from: Xray Chest 1 View- PORTABLE-Urgent (Xray Chest 1 View- PORTABLE-Urgent .) (11.22.23 @ 09:41) >  IMPRESSION: Mild progression left upper lobe consolidation  < end of copied text >    Advanced directives addressed: full resuscitation

## 2023-11-24 NOTE — PROGRESS NOTE ADULT - SUBJECTIVE AND OBJECTIVE BOX
Subjective:    pat better, still feel sob & congested cough, sitting in bed.    Home Medications:  Albuterol (Eqv-ProAir HFA) 90 mcg/inh inhalation aerosol: 2 puff(s) inhaled every 6 hours (17 Nov 2023 20:15)  albuterol 2.5 mg/3 mL (0.083%) inhalation solution: 3 milliliter(s) by nebulizer 4 times a day (17 Nov 2023 20:15)  folic acid 1 mg oral tablet: 1 tab(s) orally once a day (17 Nov 2023 20:15)  naproxen 500 mg oral tablet: 1 tab(s) orally once a day as needed for (17 Nov 2023 20:15)    MEDICATIONS  (STANDING):  acetaminophen   IVPB .. 675 milliGRAM(s) IV Intermittent once  albuterol    90 MICROgram(s) HFA Inhaler 2 Puff(s) Inhalation every 6 hours  budesonide 160 MICROgram(s)/formoterol 4.5 MICROgram(s) Inhaler 2 Puff(s) Inhalation two times a day  cefepime  Injectable. 2000 milliGRAM(s) IV Push every 8 hours  enoxaparin Injectable 40 milliGRAM(s) SubCutaneous every 24 hours  folic acid 1 milliGRAM(s) Oral daily  hydrocodone/homatropine Syrup 5 milliLiter(s) Oral every 8 hours  methylPREDNISolone sodium succinate Injectable 40 milliGRAM(s) IV Push every 12 hours    MEDICATIONS  (PRN):  acetaminophen     Tablet .. 650 milliGRAM(s) Oral every 6 hours PRN Temp greater or equal to 38C (100.4F), Mild Pain (1 - 3)  albuterol    90 MICROgram(s) HFA Inhaler 2 Puff(s) Inhalation every 6 hours PRN Shortness of Breath and/or Wheezing  aluminum hydroxide/magnesium hydroxide/simethicone Suspension 30 milliLiter(s) Oral every 4 hours PRN Dyspepsia  guaiFENesin  milliGRAM(s) Oral every 12 hours PRN Cough  melatonin 3 milliGRAM(s) Oral at bedtime PRN Insomnia  morphine  - Injectable 2 milliGRAM(s) IV Push every 8 hours PRN Moderate Pain (4 - 6)  ondansetron Injectable 4 milliGRAM(s) IV Push every 6 hours PRN Nausea and/or Vomiting      Allergies    No Known Allergies    Intolerances        Vital Signs Last 24 Hrs  T(C): 36.6 (24 Nov 2023 09:16), Max: 36.7 (23 Nov 2023 15:18)  T(F): 97.8 (24 Nov 2023 09:16), Max: 98.1 (23 Nov 2023 15:18)  HR: 102 (24 Nov 2023 09:16) (90 - 102)  BP: 103/64 (24 Nov 2023 09:16) (99/57 - 103/64)  BP(mean): --  RR: 17 (24 Nov 2023 09:16) (17 - 18)  SpO2: 96% (24 Nov 2023 09:16) (93% - 96%)    Parameters below as of 24 Nov 2023 09:16  Patient On (Oxygen Delivery Method): room air          PHYSICAL EXAMINATION:    NECK:  Supple. No lymphadenopathy. Jugular venous pressure not elevated. Carotids equal.   HEART:   The cardiac impulse has a normal quality. Reg., Nl S1 and S2.  There are no murmurs, rubs or gallops noted  CHEST:  Chest crackles to auscultation. Normal respiratory effort.  ABDOMEN:  Soft and nontender.   EXTREMITIES:  There is no edema.       LABS:

## 2023-11-24 NOTE — PROGRESS NOTE ADULT - SUBJECTIVE AND OBJECTIVE BOX
CHIEF COMPLAINT/INTERVAL HISTORY:    Patient is a 33y old  Female who presents with a chief complaint of CHARLY pneumonia and B/L bronchiectasis (2023 18:36)      HPI:  Chief Complaint: chest pain.    The patient is a 33y Female with no significant PMH presented in ED with c/o worsening left chest pain since yesterday.  Her chest pain is mainly located in left upper chest.  She also has on and off fever, cough and chills for last couple weeks. Her appetite is also low, and has lost about 4-5 kg of body weight in last 5-6 months.   She denies any night sweats or diaphoresis. As per her , she has some lung problems going on for last few years while she was in [place]. [place] recently come to [place] about 6 months ago.  She denies any personal or family history of tuberculosis.   Upon arrival in ED, she was tachycardic with HR in 140s but afebrile. She is saturating well on RA.   Sig labs: WBC 22.12k, N 86%, Lactate 1.8,  D-dimer 480,  Hb11.6, MCV 74, RDW 16, . Lipase 14 and Mg 2.1.  CXR CHARLY consolidation.  CT angio chest: Negative for PE, but shows CHARLY pneumonia and B/L extensive bronchiectasis.  EK bpm in sinus tachycardia.    negative for MTB    Vital Signs Last 24 Hrs  T(C): 36.6 (2023 00:07), Max: 36.7 (2023 15:18)  T(F): 97.8 (2023 00:07), Max: 98.1 (2023 15:18)  HR: 99 (2023 00:07) (99 - 101)  BP: 99/57 (2023 00:07) (99/57 - 100/62)  BP(mean): --  RR: 18 (2023 00:07) (18 - 18)  SpO2: 95% (2023 00:07) (93% - 95%)    Parameters below as of 2023 00:07  Patient On (Oxygen Delivery Method): room air      MEDICATIONS  (STANDING):  acetaminophen   IVPB .. 675 milliGRAM(s) IV Intermittent once  albuterol    90 MICROgram(s) HFA Inhaler 2 Puff(s) Inhalation every 6 hours  budesonide 160 MICROgram(s)/formoterol 4.5 MICROgram(s) Inhaler 2 Puff(s) Inhalation two times a day  cefepime  Injectable. 2000 milliGRAM(s) IV Push every 8 hours  enoxaparin Injectable 40 milliGRAM(s) SubCutaneous every 24 hours  folic acid 1 milliGRAM(s) Oral daily  hydrocodone/homatropine Syrup 5 milliLiter(s) Oral every 8 hours  predniSONE   Tablet 40 milliGRAM(s) Oral daily    MEDICATIONS  (PRN):  acetaminophen     Tablet .. 650 milliGRAM(s) Oral every 6 hours PRN Temp greater or equal to 38C (100.4F), Mild Pain (1 - 3)  albuterol    90 MICROgram(s) HFA Inhaler 2 Puff(s) Inhalation every 6 hours PRN Shortness of Breath and/or Wheezing  aluminum hydroxide/magnesium hydroxide/simethicone Suspension 30 milliLiter(s) Oral every 4 hours PRN Dyspepsia  guaiFENesin  milliGRAM(s) Oral every 12 hours PRN Cough  melatonin 3 milliGRAM(s) Oral at bedtime PRN Insomnia  morphine  - Injectable 2 milliGRAM(s) IV Push every 8 hours PRN Moderate Pain (4 - 6)  ondansetron Injectable 4 milliGRAM(s) IV Push every 6 hours PRN Nausea and/or Vomiting        PHYSICAL EXAM:      NERVOUS SYSTEM:  Alert & Oriented X3, Motor Strength 5/5 B/L upper and lower extremities; DTRs 2+ intact and symmetric  CHEST/LUNG: Clear to auscultation bilaterally; No rales, rhonchi, wheezing, or rubs  HEART: Regular rate and rhythm; No murmurs, rubs, or gallops  ABDOMEN: Soft, Nontender, Nondistended; Bowel sounds present  EXTREMITIES:  2+ Peripheral Pulses, No clubbing, cyanosis, or edema    LABS:                        10.2   14.21 )-----------( 519      ( 2023 06:54 )             31.6         135  |  102  |  14  ----------------------------<  94  4.2   |  25  |  0.55    Ca    9.3      2023 06:54        Urinalysis Basic - ( 2023 06:54 )    Color: x / Appearance: x / SG: x / pH: x  Gluc: 94 mg/dL / Ketone: x  / Bili: x / Urobili: x   Blood: x / Protein: x / Nitrite: x   Leuk Esterase: x / RBC: x / WBC x   Sq Epi: x / Non Sq Epi: x / Bacteria: x        CAPILLARY BLOOD GLUCOSE          RECENT CULTURES:      RADIOLOGY & ADDITIONAL TESTS: personally reviewed    < from: CT Angio Chest PE Protocol w/ IV Cont (23 @ 17:19) >  No endobronchial lesions are noted. Extensive bronchiectasis is noted   within both lungs. Some of the dilated bronchi are filled with   mucus/secretions. Fairly large consolidation is noted in the left upper   lobe. It is new when compared to previous exam. No pleural effusions are   noted.    Below the diaphragm, visualized portions of the abdomen are unremarkable.    Visualized osseous structures are within normal limits.    IMPRESSION: No pulmonary embolus is noted.    Findings suggestive of left upper lobe pneumonia.    Extensive bronchiectasis within both lungs.            Fever. CHARLY pneumonia. 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 Leukocytosis (improving)  - concern for possible MTB with clinical presentation and radiological findings - airborne precautions - sputum afb (-) x 2 f/u 3rd sputum - if negative dc airborne precautions  - on IV cefepime 2gmq8h   - Follow cultures/ Sputum AFB-neg  - Serum serology-aspergillus, alph1, antitrypsin, & VEGF-D testing  - Ct surgery fu  - Trial of steroids with persistent symptoms  - on dc po ceftin 500mg BID 7-10 day course, doxycycline 100mg BID x 7 days total   - tolerating abx well so far; no side effects noted  - bronchoscopy on monday /  tuesday      # VTE Px: Lovenox sq daily.    fam informed [phone]       CHIEF COMPLAINT/INTERVAL HISTORY:    Patient is a 33y old  Female who presents with a chief complaint of CHARLY pneumonia and B/L bronchiectasis (2023 18:36)      HPI:  Chief Complaint: chest pain.    The patient is a 33y Female with no significant PMH presented in ED with c/o worsening left chest pain since yesterday.  Her chest pain is mainly located in left upper chest.  She also has on and off fever, cough and chills for last couple weeks. Her appetite is also low, and has lost about 4-5 kg of body weight in last 5-6 months.   She denies any night sweats or diaphoresis. As per her , she has some lung problems going on for last few years while she was in [place]. [place] recently come to [place] about 6 [place] ago.  She denies any personal or family history of tuberculosis.   Upon arrival in ED, she was tachycardic with HR in 140s but afebrile. She is saturating well on RA.   Sig labs: WBC 22.12k, N 86%, Lactate 1.8,  D-dimer 480,  Hb11.6, MCV 74, RDW 16, . Lipase 14 and Mg 2.1.  CXR CHARLY consolidation.  CT angio chest: Negative for PE, but shows CHARLY pneumonia and B/L extensive bronchiectasis.  EK bpm in sinus tachycardia.    negative for MTB    Vital Signs Last 24 Hrs  T(C): 36.6 (2023 00:07), Max: 36.7 (2023 15:18)  T(F): 97.8 (2023 00:07), Max: 98.1 (2023 15:18)  HR: 99 (2023 00:07) (99 - 101)  BP: 99/57 (2023 00:07) (99/57 - 100/62)  BP(mean): --  RR: 18 (2023 00:07) (18 - 18)  SpO2: 95% (2023 00:07) (93% - 95%)    Parameters below as of 2023 00:07  Patient On (Oxygen Delivery Method): room air      MEDICATIONS  (STANDING):  acetaminophen   IVPB .. 675 milliGRAM(s) IV Intermittent once  albuterol    90 MICROgram(s) HFA Inhaler 2 Puff(s) Inhalation every 6 hours  budesonide 160 MICROgram(s)/formoterol 4.5 MICROgram(s) Inhaler 2 Puff(s) Inhalation two times a day  cefepime  Injectable. 2000 milliGRAM(s) IV Push every 8 hours  enoxaparin Injectable 40 milliGRAM(s) SubCutaneous every 24 hours  folic acid 1 milliGRAM(s) Oral daily  hydrocodone/homatropine Syrup 5 milliLiter(s) Oral every 8 hours  predniSONE   Tablet 40 milliGRAM(s) Oral daily    MEDICATIONS  (PRN):  acetaminophen     Tablet .. 650 milliGRAM(s) Oral every 6 hours PRN Temp greater or equal to 38C (100.4F), Mild Pain (1 - 3)  albuterol    90 MICROgram(s) HFA Inhaler 2 Puff(s) Inhalation every 6 hours PRN Shortness of Breath and/or Wheezing  aluminum hydroxide/magnesium hydroxide/simethicone Suspension 30 milliLiter(s) Oral every 4 hours PRN Dyspepsia  guaiFENesin  milliGRAM(s) Oral every 12 hours PRN Cough  melatonin 3 milliGRAM(s) Oral at bedtime PRN Insomnia  morphine  - Injectable 2 milliGRAM(s) IV Push every 8 hours PRN Moderate Pain (4 - 6)  ondansetron Injectable 4 milliGRAM(s) IV Push every 6 hours PRN Nausea and/or Vomiting        PHYSICAL EXAM:      NERVOUS SYSTEM:  Alert & Oriented X3, Motor Strength 5/5 B/L upper and lower extremities; DTRs 2+ intact and symmetric  CHEST/LUNG: Clear to auscultation bilaterally; No rales, rhonchi, wheezing, or rubs  HEART: Regular rate and rhythm; No murmurs, rubs, or gallops  ABDOMEN: Soft, Nontender, Nondistended; Bowel sounds present  EXTREMITIES:  2+ Peripheral Pulses, No clubbing, cyanosis, or edema    LABS:                        10.2   14.21 )-----------( 519      ( 2023 06:54 )             31.6         135  |  102  |  14  ----------------------------<  94  4.2   |  25  |  0.55    Ca    9.3      2023 06:54        Urinalysis Basic - ( 2023 06:54 )    Color: x / Appearance: x / SG: x / pH: x  Gluc: 94 mg/dL / Ketone: x  / Bili: x / Urobili: x   Blood: x / Protein: x / Nitrite: x   Leuk Esterase: x / RBC: x / WBC x   Sq Epi: x / Non Sq Epi: x / Bacteria: x        CAPILLARY BLOOD GLUCOSE          RECENT CULTURES:      RADIOLOGY & ADDITIONAL TESTS: personally reviewed    < from: CT Angio Chest PE Protocol w/ IV Cont (23 @ 17:19) >  No endobronchial lesions are noted. Extensive bronchiectasis is noted   within both lungs. Some of the dilated bronchi are filled with   mucus/secretions. Fairly large consolidation is noted in the left upper   lobe. It is new when compared to previous exam. No pleural effusions are   noted.    Below the diaphragm, visualized portions of the abdomen are unremarkable.    Visualized osseous structures are within normal limits.    IMPRESSION: No pulmonary embolus is noted.    Findings suggestive of left upper lobe pneumonia.    Extensive bronchiectasis within both lungs.            # Fever. CHARLY pneumonia. 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 Leukocytosis (improving). Bronchiectasis could have been a chronic problem  AFB negative  - on IV cefepime 2gmq8h   - Follow cultures/ Sputum AFB-neg  - Serum serology-aspergillus, alph1, antitrypsin, & VEGF-D testing noted  - Trial of steroids with persistent symptoms  consult ID re: adding Vanco  - bronchoscopy on monday /  tuesday      # VTE Px: Lovenox sq daily.    fam informed -[phone]

## 2023-11-24 NOTE — PROGRESS NOTE ADULT - ASSESSMENT
33y Female with no significant PMH presented in ED with c/o worsening left chest pain since day prior to admit. Her chest pain is mainly located in left upper chest.  She also has on and off fever, cough and chills for last couple weeks. Her appetite is also low, and has lost about 4-5 kg of body weight in last 5-6 months.   She denies any night sweats or diaphoresis. As per her , she has some lung problems going on for last few years while she was in [place]. [place] has recently come to [place] about 6 [place] ago.  She denies any personal or family history of tuberculosis.  Upon arrival in ED, she was tachycardic with HR in 140s but afebrile. She is saturating well on RA.  Sig labs: WBC 22.12k, N 86%, Lactate 1.8,  D-dimer 480,  Hb11.6, MCV 74, RDW 16, . Lipase 14 and Mg 2.1. CXR CHARLY consolidation. CT angio chest: Negative for PE, but shows CHARLY pneumonia and B/L extensive bronchiectasis. EK bpm in sinus tachycardia. NS 2 L bolus and Levaquin 740 mg IV given in ED.    1. Fever. CHARLY pneumonia. 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 Leukocytosis  -fever resolving  -CXR shows mild progression of CHARLY infiltrate  -sputum shows only normal mike, no MRSA  - imaging reviewed  - pulmonary eval noted  - on IV cefepime 2gmq8h # 7  - s/p doxycycline 100mg BID # 5  - continue with antibiotic coverage with cefepime  -no evidence of MRSA in sputum, no need for vancomycin  - f/u cultures, sputum cx nl mike  - was having work up prior to arrival for cystic fibrosis per pts friend - has been sick and had previous pulmonary illness as child as well   - may require bronchoscopy- as outpt  - HIV test (-)  - on dc po ceftin 500mg BID 7-10 day course  - tolerating abx well so far; no side effects noted  - supportive care  - fu cbc    2. other issues - care per medicine     d/w Dr. Motley

## 2023-11-24 NOTE — PROGRESS NOTE ADULT - ASSESSMENT
33y Female with no significant PMH presented in ED with c/o worsening left chest pain since yesterday.  Her chest pain is mainly located in left upper chest.  She also has on and off fever, cough and chills for last couple weeks. Her appetite is also low, and has lost about 4-5 kg of body weight in last 5-6 months.   She denies any night sweats or diaphoresis. As per her , she has some lung problems going on for last few years while she was in [place]. She has recently come to [place] about 6 [place] ago.  She denies any personal or family history of tuberculosis.     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 Diagnostic considerations include cystic fibrosis, Lucho-Coleman syndrome, allergic bronchopulmonary aspergillosis (ABPA).  Concern for possible MTB with clinical presentation and radiological findings - airborne precautions and check  Sinus tachycardia likely due to pneumonia  Hypochromic microcytic  anemia    PLAN:    pulmonary repeat CT chest, esr/crp, change to iv steroids, Id [id number] trending down-spoke to Dr stephenson  Follow cultures/ Sputum AFB-neg  Serum serology-aspergillus, alph1, antitrypsin, & VEGF-D testing  Ct surgery fu  Iv cefepime  Iv solu-medrols 40mg q12hr  Albuterol prn  Robitussin prn  TSH level  Spoke to elaina &  in [place]/ spoke to Dr Mak again today in detail-friend Doctor  DVT prophylaxis: Lovenox sq daily

## 2023-11-25 LAB
ANION GAP SERPL CALC-SCNC: 6 MMOL/L — SIGNIFICANT CHANGE UP (ref 5–17)
ANION GAP SERPL CALC-SCNC: 6 MMOL/L — SIGNIFICANT CHANGE UP (ref 5–17)
BUN SERPL-MCNC: 14 MG/DL — SIGNIFICANT CHANGE UP (ref 7–23)
BUN SERPL-MCNC: 14 MG/DL — SIGNIFICANT CHANGE UP (ref 7–23)
C ALBICANS IGE QN: 2.61 KUA/L — HIGH
C ALBICANS IGE QN: 2.61 KUA/L — HIGH
CALCIUM SERPL-MCNC: 9 MG/DL — SIGNIFICANT CHANGE UP (ref 8.5–10.1)
CALCIUM SERPL-MCNC: 9 MG/DL — SIGNIFICANT CHANGE UP (ref 8.5–10.1)
CAT DANDER IGE QN: 0.11 KUA/L — SIGNIFICANT CHANGE UP
CAT DANDER IGE QN: 0.11 KUA/L — SIGNIFICANT CHANGE UP
CHLORIDE SERPL-SCNC: 103 MMOL/L — SIGNIFICANT CHANGE UP (ref 96–108)
CHLORIDE SERPL-SCNC: 103 MMOL/L — SIGNIFICANT CHANGE UP (ref 96–108)
CLADOSPORIUM IGE QN: 1.72 KUA/L — HIGH
CLADOSPORIUM IGE QN: 1.72 KUA/L — HIGH
CLADOSPORIUM IGE QN: 2 (ref 0–0)
CLADOSPORIUM IGE QN: 2 (ref 0–0)
CO2 SERPL-SCNC: 28 MMOL/L — SIGNIFICANT CHANGE UP (ref 22–31)
CO2 SERPL-SCNC: 28 MMOL/L — SIGNIFICANT CHANGE UP (ref 22–31)
COMMON RAGWEED IGE QN: 0.76 KUA/L — HIGH
COMMON RAGWEED IGE QN: 0.76 KUA/L — HIGH
CREAT SERPL-MCNC: 0.56 MG/DL — SIGNIFICANT CHANGE UP (ref 0.5–1.3)
CREAT SERPL-MCNC: 0.56 MG/DL — SIGNIFICANT CHANGE UP (ref 0.5–1.3)
D FARINAE IGE QN: 2.44 KUA/L — HIGH
D FARINAE IGE QN: 2.44 KUA/L — HIGH
D PTERONYSS IGE QN: 2.22 KUA/L — HIGH
D PTERONYSS IGE QN: 2.22 KUA/L — HIGH
DEPRECATED A ALTERNATA IGE RAST QL: 7.9 KUA/L — HIGH
DEPRECATED A ALTERNATA IGE RAST QL: 7.9 KUA/L — HIGH
DEPRECATED A FUMIGATUS IGE RAST QL: 4 (ref 0–0)
DEPRECATED A FUMIGATUS IGE RAST QL: 4 (ref 0–0)
DEPRECATED ALTERNARIA IGE RAST QL: 3 (ref 0–0)
DEPRECATED ALTERNARIA IGE RAST QL: 3 (ref 0–0)
DEPRECATED C ALBICANS IGE RAST QL: 2
DEPRECATED C ALBICANS IGE RAST QL: 2
DEPRECATED CAT DANDER IGE RAST QL: SIGNIFICANT CHANGE UP (ref 0–0)
DEPRECATED CAT DANDER IGE RAST QL: SIGNIFICANT CHANGE UP (ref 0–0)
DEPRECATED COMMON RAGWEED IGE RAST QL: 2 (ref 0–0)
DEPRECATED COMMON RAGWEED IGE RAST QL: 2 (ref 0–0)
DEPRECATED D FARINAE IGE RAST QL: 2 (ref 0–0)
DEPRECATED D FARINAE IGE RAST QL: 2 (ref 0–0)
DEPRECATED M RACEMOSUS IGE RAST QL: 2
DEPRECATED M RACEMOSUS IGE RAST QL: 2
DEPRECATED ROACH IGE RAST QL: 2 (ref 0–0)
DEPRECATED ROACH IGE RAST QL: 2 (ref 0–0)
DEPRECATED TIMOTHY IGE RAST QL: 2 (ref 0–0)
DEPRECATED TIMOTHY IGE RAST QL: 2 (ref 0–0)
DOG DANDER IGE QN: 0.6 KUA/L — HIGH
DOG DANDER IGE QN: 0.6 KUA/L — HIGH
DOG DANDER IGE QN: 1 (ref 0–0)
DOG DANDER IGE QN: 1 (ref 0–0)
DUST ALLERG MIX2 IGE RAST: 2 (ref 0–0)
DUST ALLERG MIX2 IGE RAST: 2 (ref 0–0)
EGFR: 125 ML/MIN/1.73M2 — SIGNIFICANT CHANGE UP
EGFR: 125 ML/MIN/1.73M2 — SIGNIFICANT CHANGE UP
GLUCOSE SERPL-MCNC: 115 MG/DL — HIGH (ref 70–99)
GLUCOSE SERPL-MCNC: 115 MG/DL — HIGH (ref 70–99)
HCT VFR BLD CALC: 30.1 % — LOW (ref 34.5–45)
HCT VFR BLD CALC: 30.1 % — LOW (ref 34.5–45)
HGB BLD-MCNC: 9.5 G/DL — LOW (ref 11.5–15.5)
HGB BLD-MCNC: 9.5 G/DL — LOW (ref 11.5–15.5)
MCHC RBC-ENTMCNC: 24.7 PG — LOW (ref 27–34)
MCHC RBC-ENTMCNC: 24.7 PG — LOW (ref 27–34)
MCHC RBC-ENTMCNC: 31.6 GM/DL — LOW (ref 32–36)
MCHC RBC-ENTMCNC: 31.6 GM/DL — LOW (ref 32–36)
MCV RBC AUTO: 78.4 FL — LOW (ref 80–100)
MCV RBC AUTO: 78.4 FL — LOW (ref 80–100)
MOLD ALLERG MIX A IGE QL: 46.3 KUA/L — HIGH
MOLD ALLERG MIX A IGE QL: 46.3 KUA/L — HIGH
MOLD ALLERG MIX6 IGG QL: 0.99 KUA/L — HIGH
MOLD ALLERG MIX6 IGG QL: 0.99 KUA/L — HIGH
PLATELET # BLD AUTO: 565 K/UL — HIGH (ref 150–400)
PLATELET # BLD AUTO: 565 K/UL — HIGH (ref 150–400)
POTASSIUM SERPL-MCNC: 4.5 MMOL/L — SIGNIFICANT CHANGE UP (ref 3.5–5.3)
POTASSIUM SERPL-MCNC: 4.5 MMOL/L — SIGNIFICANT CHANGE UP (ref 3.5–5.3)
POTASSIUM SERPL-SCNC: 4.5 MMOL/L — SIGNIFICANT CHANGE UP (ref 3.5–5.3)
POTASSIUM SERPL-SCNC: 4.5 MMOL/L — SIGNIFICANT CHANGE UP (ref 3.5–5.3)
RBC # BLD: 3.84 M/UL — SIGNIFICANT CHANGE UP (ref 3.8–5.2)
RBC # BLD: 3.84 M/UL — SIGNIFICANT CHANGE UP (ref 3.8–5.2)
RBC # FLD: 17.5 % — HIGH (ref 10.3–14.5)
RBC # FLD: 17.5 % — HIGH (ref 10.3–14.5)
ROACH IGE QN: 1.26 KUA/L — HIGH
ROACH IGE QN: 1.26 KUA/L — HIGH
SODIUM SERPL-SCNC: 137 MMOL/L — SIGNIFICANT CHANGE UP (ref 135–145)
SODIUM SERPL-SCNC: 137 MMOL/L — SIGNIFICANT CHANGE UP (ref 135–145)
TIMOTHY IGE QN: 0.7 KUA/L — HIGH
TIMOTHY IGE QN: 0.7 KUA/L — HIGH
TREE ALLERG MIX1 IGE QL: 0.37 KUA/L — HIGH
TREE ALLERG MIX1 IGE QL: 0.37 KUA/L — HIGH
TREE ALLERG MIX1 IGE RAST: 1 (ref 0–0)
TREE ALLERG MIX1 IGE RAST: 1 (ref 0–0)
WBC # BLD: 13.67 K/UL — HIGH (ref 3.8–10.5)
WBC # BLD: 13.67 K/UL — HIGH (ref 3.8–10.5)
WBC # FLD AUTO: 13.67 K/UL — HIGH (ref 3.8–10.5)
WBC # FLD AUTO: 13.67 K/UL — HIGH (ref 3.8–10.5)

## 2023-11-25 PROCEDURE — 99232 SBSQ HOSP IP/OBS MODERATE 35: CPT

## 2023-11-25 RX ADMIN — BUDESONIDE AND FORMOTEROL FUMARATE DIHYDRATE 2 PUFF(S): 160; 4.5 AEROSOL RESPIRATORY (INHALATION) at 20:12

## 2023-11-25 RX ADMIN — Medication 40 MILLIGRAM(S): at 23:11

## 2023-11-25 RX ADMIN — Medication 40 MILLIGRAM(S): at 09:33

## 2023-11-25 RX ADMIN — ALBUTEROL 2 PUFF(S): 90 AEROSOL, METERED ORAL at 14:53

## 2023-11-25 RX ADMIN — ENOXAPARIN SODIUM 40 MILLIGRAM(S): 100 INJECTION SUBCUTANEOUS at 23:10

## 2023-11-25 RX ADMIN — CEFEPIME 2000 MILLIGRAM(S): 1 INJECTION, POWDER, FOR SOLUTION INTRAMUSCULAR; INTRAVENOUS at 23:10

## 2023-11-25 RX ADMIN — Medication 1 MILLIGRAM(S): at 09:33

## 2023-11-25 RX ADMIN — CEFEPIME 2000 MILLIGRAM(S): 1 INJECTION, POWDER, FOR SOLUTION INTRAMUSCULAR; INTRAVENOUS at 13:54

## 2023-11-25 RX ADMIN — ALBUTEROL 2 PUFF(S): 90 AEROSOL, METERED ORAL at 20:12

## 2023-11-25 RX ADMIN — ALBUTEROL 2 PUFF(S): 90 AEROSOL, METERED ORAL at 09:40

## 2023-11-25 RX ADMIN — BUDESONIDE AND FORMOTEROL FUMARATE DIHYDRATE 2 PUFF(S): 160; 4.5 AEROSOL RESPIRATORY (INHALATION) at 09:40

## 2023-11-25 RX ADMIN — CEFEPIME 2000 MILLIGRAM(S): 1 INJECTION, POWDER, FOR SOLUTION INTRAMUSCULAR; INTRAVENOUS at 06:03

## 2023-11-25 NOTE — PROGRESS NOTE ADULT - ASSESSMENT
33y Female with no significant PMH presented in ED with c/o worsening left chest pain since yesterday.  Her chest pain is mainly located in left upper chest.  She also has on and off fever, cough and chills for last couple weeks. Her appetite is also low, and has lost about 4-5 kg of body weight in last 5-6 months.   She denies any night sweats or diaphoresis. As per her , she has some lung problems going on for last few years while she was in [place]. She has recently come to [place] about 6 [place] ago.  She denies any personal or family history of tuberculosis.     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 Diagnostic considerations include cystic fibrosis, Lucho-Coleman syndrome, allergic bronchopulmonary aspergillosis (ABPA).  Concern for possible MTB with clinical presentation and radiological findings - airborne precautions and check  Sinus tachycardia likely due to pneumonia  Hypochromic microcytic  anemia    PLAN:    Pulmonary slow improvements  Bronch next week  Follow cultures/ Sputum AFB-neg  Serum serology-aspergillus, alph1, antitrypsin, & VEGF-D testing  Ct surgery fu  Iv cefepime  Iv solu-medrols 40mg q12hr  Albuterol prn  Robitussin prn  TSH level  Spoke to elaina &  in Arabic/ spoke to Dr Mak again today in detail-friend Doctor  DVT prophylaxis: Lovenox sq daily

## 2023-11-25 NOTE — PROGRESS NOTE ADULT - SUBJECTIVE AND OBJECTIVE BOX
Subjective:    pat c/o discomfort left upper lung, ct chest slow improvements, , sitting in bed.    Home Medications:  Albuterol (Eqv-ProAir HFA) 90 mcg/inh inhalation aerosol: 2 puff(s) inhaled every 6 hours (17 Nov 2023 20:15)  albuterol 2.5 mg/3 mL (0.083%) inhalation solution: 3 milliliter(s) by nebulizer 4 times a day (17 Nov 2023 20:15)  folic acid 1 mg oral tablet: 1 tab(s) orally once a day (17 Nov 2023 20:15)  naproxen 500 mg oral tablet: 1 tab(s) orally once a day as needed for (17 Nov 2023 20:15)    MEDICATIONS  (STANDING):  acetaminophen   IVPB .. 675 milliGRAM(s) IV Intermittent once  albuterol    90 MICROgram(s) HFA Inhaler 2 Puff(s) Inhalation every 6 hours  budesonide 160 MICROgram(s)/formoterol 4.5 MICROgram(s) Inhaler 2 Puff(s) Inhalation two times a day  cefepime  Injectable. 2000 milliGRAM(s) IV Push every 8 hours  enoxaparin Injectable 40 milliGRAM(s) SubCutaneous every 24 hours  folic acid 1 milliGRAM(s) Oral daily  hydrocodone/homatropine Syrup 5 milliLiter(s) Oral every 8 hours  methylPREDNISolone sodium succinate Injectable 40 milliGRAM(s) IV Push every 12 hours    MEDICATIONS  (PRN):  acetaminophen     Tablet .. 650 milliGRAM(s) Oral every 6 hours PRN Temp greater or equal to 38C (100.4F), Mild Pain (1 - 3)  albuterol    90 MICROgram(s) HFA Inhaler 2 Puff(s) Inhalation every 6 hours PRN Shortness of Breath and/or Wheezing  aluminum hydroxide/magnesium hydroxide/simethicone Suspension 30 milliLiter(s) Oral every 4 hours PRN Dyspepsia  guaiFENesin  milliGRAM(s) Oral every 12 hours PRN Cough  melatonin 3 milliGRAM(s) Oral at bedtime PRN Insomnia  morphine  - Injectable 2 milliGRAM(s) IV Push every 8 hours PRN Moderate Pain (4 - 6)  ondansetron Injectable 4 milliGRAM(s) IV Push every 6 hours PRN Nausea and/or Vomiting      Allergies    No Known Allergies    Intolerances        Vital Signs Last 24 Hrs  T(C): 36.6 (25 Nov 2023 07:54), Max: 36.8 (24 Nov 2023 15:07)  T(F): 97.8 (25 Nov 2023 07:54), Max: 98.2 (24 Nov 2023 15:07)  HR: 89 (25 Nov 2023 09:42) (74 - 96)  BP: 93/47 (25 Nov 2023 07:54) (93/47 - 106/63)  BP(mean): --  RR: 17 (25 Nov 2023 07:54) (17 - 18)  SpO2: 96% (25 Nov 2023 07:54) (94% - 97%)    Parameters below as of 25 Nov 2023 07:54  Patient On (Oxygen Delivery Method): room air          PHYSICAL EXAMINATION:    NECK:  Supple. No lymphadenopathy. Jugular venous pressure not elevated. Carotids equal.   HEART:   The cardiac impulse has a normal quality. Reg., Nl S1 and S2.  There are no murmurs, rubs or gallops noted  CHEST:  Chest crackles to auscultation. Normal respiratory effort.  ABDOMEN:  Soft and nontender.   EXTREMITIES:  There is no edema.       LABS:                        9.5    13.67 )-----------( 565      ( 25 Nov 2023 07:20 )             30.1     11-25    137  |  103  |  14  ----------------------------<  115<H>  4.5   |  28  |  0.56    Ca    9.0      25 Nov 2023 07:20        Urinalysis Basic - ( 25 Nov 2023 07:20 )    Color: x / Appearance: x / SG: x / pH: x  Gluc: 115 mg/dL / Ketone: x  / Bili: x / Urobili: x   Blood: x / Protein: x / Nitrite: x   Leuk Esterase: x / RBC: x / WBC x   Sq Epi: x / Non Sq Epi: x / Bacteria: x          CT Chest No Cont (11.24.23 @ 12:58) >  IMPRESSION:  Large left upper lobe consolidation is slightly decreased in size   compared to the prior study with areas of interval lucency. New patchy   left upper lobe opacity.

## 2023-11-25 NOTE — PROGRESS NOTE ADULT - SUBJECTIVE AND OBJECTIVE BOX
CHIEF COMPLAINT/INTERVAL HISTORY:    Patient is a 33y old  Female who presents with a chief complaint of CHARLY pneumonia and B/L bronchiectasis (2023 18:36)      HPI:  Chief Complaint: chest pain.    The patient is a 33y Female with no significant PMH presented in ED with c/o worsening left chest pain since yesterday.  Her chest pain is mainly located in left upper chest.  She also has on and off fever, cough and chills for last couple weeks. Her appetite is also low, and has lost about 4-5 kg of body weight in last 5-6 months.   She denies any night sweats or diaphoresis. As per her , she has some lung problems going on for last few years while she was in [place]. [place] recently come to [place] about 6 [place] ago.  She denies any personal or family history of tuberculosis.   Upon arrival in ED, she was tachycardic with HR in 140s but afebrile. She is saturating well on RA.   Sig labs: WBC 22.12k, N 86%, Lactate 1.8,  D-dimer 480,  Hb11.6, MCV 74, RDW 16, . Lipase 14 and Mg 2.1.  CXR CHARLY consolidation.  CT angio chest: Negative for PE, but shows CHARLY pneumonia and B/L extensive bronchiectasis.  EK bpm in sinus tachycardia.    negative for MTB    Vital Signs Last 24 Hrs  T(C): 36.6 (2023 07:54), Max: 36.8 (2023 15:07)  T(F): 97.8 (2023 07:54), Max: 98.2 (2023 15:07)  HR: 89 (2023 09:42) (74 - 96)  BP: 93/47 (2023 07:54) (93/47 - 106/63)  BP(mean): --  RR: 17 (2023 07:54) (17 - 18)  SpO2: 96% (2023 07:54) (94% - 97%)    Parameters below as of 2023 07:54  Patient On (Oxygen Delivery Method): room air          MEDICATIONS  (STANDING):  acetaminophen   IVPB .. 675 milliGRAM(s) IV Intermittent once  albuterol    90 MICROgram(s) HFA Inhaler 2 Puff(s) Inhalation every 6 hours  budesonide 160 MICROgram(s)/formoterol 4.5 MICROgram(s) Inhaler 2 Puff(s) Inhalation two times a day  cefepime  Injectable. 2000 milliGRAM(s) IV Push every 8 hours  enoxaparin Injectable 40 milliGRAM(s) SubCutaneous every 24 hours  folic acid 1 milliGRAM(s) Oral daily  hydrocodone/homatropine Syrup 5 milliLiter(s) Oral every 8 hours  predniSONE   Tablet 40 milliGRAM(s) Oral daily    MEDICATIONS  (PRN):  acetaminophen     Tablet .. 650 milliGRAM(s) Oral every 6 hours PRN Temp greater or equal to 38C (100.4F), Mild Pain (1 - 3)  albuterol    90 MICROgram(s) HFA Inhaler 2 Puff(s) Inhalation every 6 hours PRN Shortness of Breath and/or Wheezing  aluminum hydroxide/magnesium hydroxide/simethicone Suspension 30 milliLiter(s) Oral every 4 hours PRN Dyspepsia  guaiFENesin  milliGRAM(s) Oral every 12 hours PRN Cough  melatonin 3 milliGRAM(s) Oral at bedtime PRN Insomnia  morphine  - Injectable 2 milliGRAM(s) IV Push every 8 hours PRN Moderate Pain (4 - 6)  ondansetron Injectable 4 milliGRAM(s) IV Push every 6 hours PRN Nausea and/or Vomiting        PHYSICAL EXAM:      NERVOUS SYSTEM:  Alert & Oriented X3, Motor Strength 5/5 B/L upper and lower extremities; DTRs 2+ intact and symmetric  CHEST/LUNG: Clear to auscultation bilaterally; No rales, rhonchi, wheezing, or rubs  HEART: Regular rate and rhythm; No murmurs, rubs, or gallops  ABDOMEN: Soft, Nontender, Nondistended; Bowel sounds present  EXTREMITIES:  2+ Peripheral Pulses, No clubbing, cyanosis, or edema    LABS:                        10.2   14.21 )-----------( 519      ( 2023 06:54 )             31.6         135  |  102  |  14  ----------------------------<  94  4.2   |  25  |  0.55    Ca    9.3      2023 06:54        Urinalysis Basic - ( 2023 06:54 )    Color: x / Appearance: x / SG: x / pH: x  Gluc: 94 mg/dL / Ketone: x  / Bili: x / Urobili: x   Blood: x / Protein: x / Nitrite: x   Leuk Esterase: x / RBC: x / WBC x   Sq Epi: x / Non Sq Epi: x / Bacteria: x        CAPILLARY BLOOD GLUCOSE          RECENT CULTURES:      RADIOLOGY & ADDITIONAL TESTS: personally reviewed    < from: CT Angio Chest PE Protocol w/ IV Cont (23 @ 17:19) >  No endobronchial lesions are noted. Extensive bronchiectasis is noted   within both lungs. Some of the dilated bronchi are filled with   mucus/secretions. Fairly large consolidation is noted in the left upper   lobe. It is new when compared to previous exam. No pleural effusions are   noted.    Below the diaphragm, visualized portions of the abdomen are unremarkable.    Visualized osseous structures are within normal limits.    IMPRESSION: No pulmonary embolus is noted.    Findings suggestive of left upper lobe pneumonia.    Extensive bronchiectasis within both lungs.            # Fever. CHARLY pneumonia. 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 Leukocytosis (improving). Bronchiectasis could have been a chronic problem  AFB negative  - on IV cefepime 2gmq8h   - Follow cultures/ Sputum AFB-neg  - Serum serology-aspergillus, alph1, antitrypsin, & VEGF-D testing noted  - Trial of steroids with persistent symptoms  - bronchoscopy on monday /  tuesday      # VTE Px: Lovenox sq daily.    fam informed [phone]       CHIEF COMPLAINT/INTERVAL HISTORY:    Patient is a 33y old  Female who presents with a chief complaint of CHARLY pneumonia and B/L bronchiectasis (2023 18:36)      HPI:  Chief Complaint: chest pain.    The patient is a 33y Female with no significant PMH presented in ED with c/o worsening left chest pain since yesterday.  Her chest pain is mainly located in left upper chest.  She also has on and off fever, cough and chills for last couple weeks. Her appetite is also low, and has lost about 4-5 kg of body weight in last 5-6 months.   She denies any night sweats or diaphoresis. As per her , she has some lung problems going on for last few years while she was in [place]. [place] recently come to [place] about 6 [place] ago.  She denies any personal or family history of tuberculosis.   Upon arrival in ED, she was tachycardic with HR in 140s but afebrile. She is saturating well on RA.   Sig labs: WBC 22.12k, N 86%, Lactate 1.8,  D-dimer 480,  Hb11.6, MCV 74, RDW 16, . Lipase 14 and Mg 2.1.  CXR CHARLY consolidation.  CT angio chest: Negative for PE, but shows CHARLY pneumonia and B/L extensive bronchiectasis.  EK bpm in sinus tachycardia.    negative for MTB  c/o PIERSON    Vital Signs Last 24 Hrs  T(C): 36.6 (2023 07:54), Max: 36.8 (2023 15:07)  T(F): 97.8 (2023 07:54), Max: 98.2 (2023 15:07)  HR: 89 (2023 09:42) (74 - 96)  BP: 93/47 (2023 07:54) (93/47 - 106/63)  BP(mean): --  RR: 17 (2023 07:54) (17 - 18)  SpO2: 96% (2023 07:54) (94% - 97%)    Parameters below as of 2023 07:54  Patient On (Oxygen Delivery Method): room air          MEDICATIONS  (STANDING):  acetaminophen   IVPB .. 675 milliGRAM(s) IV Intermittent once  albuterol    90 MICROgram(s) HFA Inhaler 2 Puff(s) Inhalation every 6 hours  budesonide 160 MICROgram(s)/formoterol 4.5 MICROgram(s) Inhaler 2 Puff(s) Inhalation two times a day  cefepime  Injectable. 2000 milliGRAM(s) IV Push every 8 hours  enoxaparin Injectable 40 milliGRAM(s) SubCutaneous every 24 hours  folic acid 1 milliGRAM(s) Oral daily  hydrocodone/homatropine Syrup 5 milliLiter(s) Oral every 8 hours  predniSONE   Tablet 40 milliGRAM(s) Oral daily    MEDICATIONS  (PRN):  acetaminophen     Tablet .. 650 milliGRAM(s) Oral every 6 hours PRN Temp greater or equal to 38C (100.4F), Mild Pain (1 - 3)  albuterol    90 MICROgram(s) HFA Inhaler 2 Puff(s) Inhalation every 6 hours PRN Shortness of Breath and/or Wheezing  aluminum hydroxide/magnesium hydroxide/simethicone Suspension 30 milliLiter(s) Oral every 4 hours PRN Dyspepsia  guaiFENesin  milliGRAM(s) Oral every 12 hours PRN Cough  melatonin 3 milliGRAM(s) Oral at bedtime PRN Insomnia  morphine  - Injectable 2 milliGRAM(s) IV Push every 8 hours PRN Moderate Pain (4 - 6)  ondansetron Injectable 4 milliGRAM(s) IV Push every 6 hours PRN Nausea and/or Vomiting        PHYSICAL EXAM:      NERVOUS SYSTEM:  Alert & Oriented X3, Motor Strength 5/5 B/L upper and lower extremities; DTRs 2+ intact and symmetric  CHEST/LUNG: Clear to auscultation bilaterally; No rales, rhonchi, wheezing, or rubs  HEART: Regular rate and rhythm; No murmurs, rubs, or gallops  ABDOMEN: Soft, Nontender, Nondistended; Bowel sounds present  EXTREMITIES:  2+ Peripheral Pulses, No clubbing, cyanosis, or edema    LABS:                        10.2   14.21 )-----------( 519      ( 2023 06:54 )             31.6     11-    135  |  102  |  14  ----------------------------<  94  4.2   |  25  |  0.55    Ca    9.3      2023 06:54        Urinalysis Basic - ( 2023 06:54 )    Color: x / Appearance: x / SG: x / pH: x  Gluc: 94 mg/dL / Ketone: x  / Bili: x / Urobili: x   Blood: x / Protein: x / Nitrite: x   Leuk Esterase: x / RBC: x / WBC x   Sq Epi: x / Non Sq Epi: x / Bacteria: x        CAPILLARY BLOOD GLUCOSE          RECENT CULTURES:      RADIOLOGY & ADDITIONAL TESTS: personally reviewed    < from: CT Angio Chest PE Protocol w/ IV Cont (23 @ 17:19) >  No endobronchial lesions are noted. Extensive bronchiectasis is noted   within both lungs. Some of the dilated bronchi are filled with   mucus/secretions. Fairly large consolidation is noted in the left upper   lobe. It is new when compared to previous exam. No pleural effusions are   noted.    Below the diaphragm, visualized portions of the abdomen are unremarkable.    Visualized osseous structures are within normal limits.    IMPRESSION: No pulmonary embolus is noted.    Findings suggestive of left upper lobe pneumonia.    Extensive bronchiectasis within both lungs.            # Fever. CHARLY pneumonia. 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 Leukocytosis (improving). Bronchiectasis could have been a chronic problem  AFB negative  - on IV cefepime 2gmq8h   - Follow cultures/ Sputum AFB-neg  - Serum serology-aspergillus, alph1, antitrypsin, & VEGF-D testing noted  - Trial of steroids with persistent symptoms  - bronchoscopy on monday /  tuesday  check Pox ambulating    # VTE Px: Lovenox sq daily.    fam informed [phone]

## 2023-11-26 LAB
AUTO DIFF PNL BLD: NEGATIVE — SIGNIFICANT CHANGE UP
AUTO DIFF PNL BLD: NEGATIVE — SIGNIFICANT CHANGE UP
C-ANCA SER-ACNC: NEGATIVE — SIGNIFICANT CHANGE UP
C-ANCA SER-ACNC: NEGATIVE — SIGNIFICANT CHANGE UP
IGE SERPL-ACNC: 9990 KU/L — HIGH
IGE SERPL-ACNC: 9990 KU/L — HIGH
MPO AB + PR3 PNL SER: SIGNIFICANT CHANGE UP
MPO AB + PR3 PNL SER: SIGNIFICANT CHANGE UP
P-ANCA SER-ACNC: NEGATIVE — SIGNIFICANT CHANGE UP
P-ANCA SER-ACNC: NEGATIVE — SIGNIFICANT CHANGE UP
TOTAL IGE SMQN RAST: SIGNIFICANT CHANGE UP
TOTAL IGE SMQN RAST: SIGNIFICANT CHANGE UP

## 2023-11-26 PROCEDURE — 99233 SBSQ HOSP IP/OBS HIGH 50: CPT

## 2023-11-26 RX ADMIN — Medication 1 MILLIGRAM(S): at 09:52

## 2023-11-26 RX ADMIN — ENOXAPARIN SODIUM 40 MILLIGRAM(S): 100 INJECTION SUBCUTANEOUS at 21:29

## 2023-11-26 RX ADMIN — BUDESONIDE AND FORMOTEROL FUMARATE DIHYDRATE 2 PUFF(S): 160; 4.5 AEROSOL RESPIRATORY (INHALATION) at 20:46

## 2023-11-26 RX ADMIN — BUDESONIDE AND FORMOTEROL FUMARATE DIHYDRATE 2 PUFF(S): 160; 4.5 AEROSOL RESPIRATORY (INHALATION) at 09:58

## 2023-11-26 RX ADMIN — CEFEPIME 2000 MILLIGRAM(S): 1 INJECTION, POWDER, FOR SOLUTION INTRAMUSCULAR; INTRAVENOUS at 05:13

## 2023-11-26 RX ADMIN — ALBUTEROL 2 PUFF(S): 90 AEROSOL, METERED ORAL at 09:59

## 2023-11-26 RX ADMIN — ALBUTEROL 2 PUFF(S): 90 AEROSOL, METERED ORAL at 14:33

## 2023-11-26 RX ADMIN — Medication 40 MILLIGRAM(S): at 21:29

## 2023-11-26 RX ADMIN — Medication 40 MILLIGRAM(S): at 09:52

## 2023-11-26 RX ADMIN — ALBUTEROL 2 PUFF(S): 90 AEROSOL, METERED ORAL at 20:46

## 2023-11-26 RX ADMIN — CEFEPIME 2000 MILLIGRAM(S): 1 INJECTION, POWDER, FOR SOLUTION INTRAMUSCULAR; INTRAVENOUS at 14:08

## 2023-11-26 RX ADMIN — CEFEPIME 2000 MILLIGRAM(S): 1 INJECTION, POWDER, FOR SOLUTION INTRAMUSCULAR; INTRAVENOUS at 21:30

## 2023-11-26 NOTE — PROGRESS NOTE ADULT - SUBJECTIVE AND OBJECTIVE BOX
chief complaint: SOB / CHARLY pneumonia and B/L bronchiectasis (2023 18:36)      HPI:  Chief Complaint: chest pain.    The patient is a 33y Female with no significant PMH presented in ED with c/o worsening left chest pain since yesterday.  Her chest pain is mainly located in left upper chest.  She also has on and off fever, cough and chills for last couple weeks. Her appetite is also low, and has lost about 4-5 kg of body weight in last 5-6 months.   She denies any night sweats or diaphoresis. As per her , she has some lung problems going on for last few years while she was in [place]. [place] has recently come to [place] about 6 [place] ago.  She denies any personal or family history of tuberculosis.   Upon arrival in ED, she was tachycardic with HR in 140s but afebrile. She is saturating well on RA.   Sig labs: WBC 22.12k, N 86%, Lactate 1.8,  D-dimer 480,  Hb11.6, MCV 74, RDW 16, . Lipase 14 and Mg 2.1.  CXR CHARLY consolidation.  CT angio chest: Negative for PE, but shows CHARLY pneumonia and B/L extensive bronchiectasis.  EK bpm in sinus tachycardia.    S:  : Lying in bed, comfortable.  Spoke to family via phone in english, discussed plan of care, states feeling better overall.  Discussed plan for bronch Tuesday.    REVIEW OF SYSTEMS: All other review of systems is negative unless indicated above.      Vital Signs Last 24 Hrs  T(C): 36.5 (2023 11:03), Max: 36.7 (2023 23:18)  T(F): 97.7 (2023 11:03), Max: 98.1 (2023 23:18)  HR: 78 (2023 14:33) (78 - 91)  BP: 106/57 (2023 11:03) (99/57 - 106/57)  BP(mean): --  RR: 17 (2023 11:04) (17 - 18)  SpO2: 98% (2023 14:33) (96% - 98%)    Parameters below as of 2023 14:33  Patient On (Oxygen Delivery Method): room air      PHYSICAL EXAM:    Constitutional: NAD, awake and alert  HEENT: PERR, EOMI, Normal Hearing, MMM  Neck: Soft and supple  Respiratory: Breath sounds are clear bilaterally, No wheezing, rales or rhonchi  Cardiovascular: S1 and S2, regular rate and rhythm, no Murmurs, gallops or rubs  Gastrointestinal: Bowel Sounds present, soft, nontender, nondistended, no guarding, no rebound  Extremities: No peripheral edema  Neurological: A/O x 3, no focal deficits in my limited exam    MEDICATIONS  (STANDING):  acetaminophen   IVPB .. 675 milliGRAM(s) IV Intermittent once  albuterol    90 MICROgram(s) HFA Inhaler 2 Puff(s) Inhalation every 6 hours  budesonide 160 MICROgram(s)/formoterol 4.5 MICROgram(s) Inhaler 2 Puff(s) Inhalation two times a day  cefepime  Injectable. 2000 milliGRAM(s) IV Push every 8 hours  enoxaparin Injectable 40 milliGRAM(s) SubCutaneous every 24 hours  folic acid 1 milliGRAM(s) Oral daily  hydrocodone/homatropine Syrup 5 milliLiter(s) Oral every 8 hours  methylPREDNISolone sodium succinate Injectable 40 milliGRAM(s) IV Push every 12 hours    MEDICATIONS  (PRN):  acetaminophen     Tablet .. 650 milliGRAM(s) Oral every 6 hours PRN Temp greater or equal to 38C (100.4F), Mild Pain (1 - 3)  albuterol    90 MICROgram(s) HFA Inhaler 2 Puff(s) Inhalation every 6 hours PRN Shortness of Breath and/or Wheezing  aluminum hydroxide/magnesium hydroxide/simethicone Suspension 30 milliLiter(s) Oral every 4 hours PRN Dyspepsia  guaiFENesin  milliGRAM(s) Oral every 12 hours PRN Cough  melatonin 3 milliGRAM(s) Oral at bedtime PRN Insomnia  ondansetron Injectable 4 milliGRAM(s) IV Push every 6 hours PRN Nausea and/or Vomiting                                9.5    13.67 )-----------( 565      ( 2023 07:20 )             30.1     11-25    137  |  103  |  14  ----------------------------<  115<H>  4.5   |  28  |  0.56    Ca    9.0      2023 07:20      CAPILLARY BLOOD GLUCOSE            Urinalysis Basic - ( 2023 07:20 )    Color: x / Appearance: x / SG: x / pH: x  Gluc: 115 mg/dL / Ketone: x  / Bili: x / Urobili: x   Blood: x / Protein: x / Nitrite: x   Leuk Esterase: x / RBC: x / WBC x   Sq Epi: x / Non Sq Epi: x / Bacteria: x        A/P:  #Sepsis due to CHARLY pneumonia / 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    fam informed -[phone]

## 2023-11-26 NOTE — PROGRESS NOTE ADULT - SUBJECTIVE AND OBJECTIVE BOX
ICU  Note    HPI:    S:    Pt seen and examined  HD # 10  33F  PMHx none  presented in ED with c/o worsening left chest pain since yesterday.  Her chest pain is mainly located in left upper chest.  She also has on and off fever, cough and chills for last couple weeks. Her appetite is also low, and has lost about 4-5 kg of body weight in last 5-6 months.   She denies any night sweats or diaphoresis. As per her , she has some lung problems going on for last few years while she was in [place]. [place] has recently come to [place] about 6 [place] ago.  She denies any personal or family history of tuberculosis.   Upon arrival in ED, she was tachycardic with HR in 140s but afebrile. She is saturating well on RA.   Sig labs: WBC 22.12k, N 86%, Lactate 1.8,  D-dimer 480,  Hb11.6, MCV 74, RDW 16, . Lipase 14 and Mg 2.1.  CXR CHARLY consolidation.  CT angio chest: Negative for PE, but shows CHARLY pneumonia and B/L extensive bronchiectasis.  EK bpm in sinus tachycardia.    : Slowly improving. Plan for bronch on .    ROS: + SOB, improving  Remainder of systems reviewed, negative        Allergies    No Known Allergies    Intolerances        MEDICATIONS  (STANDING):  acetaminophen   IVPB .. 675 milliGRAM(s) IV Intermittent once  albuterol    90 MICROgram(s) HFA Inhaler 2 Puff(s) Inhalation every 6 hours  budesonide 160 MICROgram(s)/formoterol 4.5 MICROgram(s) Inhaler 2 Puff(s) Inhalation two times a day  cefepime  Injectable. 2000 milliGRAM(s) IV Push every 8 hours  enoxaparin Injectable 40 milliGRAM(s) SubCutaneous every 24 hours  folic acid 1 milliGRAM(s) Oral daily  hydrocodone/homatropine Syrup 5 milliLiter(s) Oral every 8 hours  methylPREDNISolone sodium succinate Injectable 40 milliGRAM(s) IV Push every 12 hours    MEDICATIONS  (PRN):  acetaminophen     Tablet .. 650 milliGRAM(s) Oral every 6 hours PRN Temp greater or equal to 38C (100.4F), Mild Pain (1 - 3)  albuterol    90 MICROgram(s) HFA Inhaler 2 Puff(s) Inhalation every 6 hours PRN Shortness of Breath and/or Wheezing  aluminum hydroxide/magnesium hydroxide/simethicone Suspension 30 milliLiter(s) Oral every 4 hours PRN Dyspepsia  guaiFENesin  milliGRAM(s) Oral every 12 hours PRN Cough  melatonin 3 milliGRAM(s) Oral at bedtime PRN Insomnia  ondansetron Injectable 4 milliGRAM(s) IV Push every 6 hours PRN Nausea and/or Vomiting      Drug Dosing Weight    Weight (kg): 45 (2023 14:37)    PAST MEDICAL & SURGICAL HISTORY:  History of lung disease      Pneumonia      No pertinent past surgical history          FAMILY HISTORY:  No pertinent family history          REVIEW OF SYSTEMS    UNLESS OTHERWISE NOTED IN HPI above:    Constitutional:  No Weight Change, No Fever, No Chills, No Night Sweats, No Fatigue, No Malaise  ENT/Mouth:  No Hearing Changes, No Ear Pain, No Nasal Congestion, No  Sinus Pain, No Hoarseness, No sore throat, No Rhinorrhea, No Swallowing  Difficulty  Eyes:  No Eye Pain, No Swelling, No Redness, No Foreign Body, No Discharge, No Vision Changes  Cardiovascular:  No Chest Pain, No SOB, No PND, No Dyspnea on Exertion,  No Orthopnea, No Claudication, No Edema, No Palpitations  Respiratory:  No Cough, No Sputum, No Wheezing, No Smoke Exposure, No Dyspnea  Gastrointestinal:  No Nausea, No Vomiting, No Diarrhea, No  Constipation, No Pain, No Heartburn, No Anorexia, No Dysphagia, No  Hematochezia, No Melena, No Flatulence, No Jaundice  Genitourinary:  No Dysmenorrhea, No DUB, No Dyspareunia, No Dysuria, No  Urinary Frequency, No Hematuria, No Urinary Incontinence, No Urgency,  No Flank Pain, No Urinary Flow Changes, No Hesitancy  Musculoskeletal:  No Arthralgias, No Myalgias, No Joint Swelling, No  Joint Stiffness, No Back Pain, No Neck Pain, No Injury History  Skin:  No Skin Lesions, No Pruritis, No Hair Changes, No Breast/Skin Changes, No Nipple Discharge  Neuro:  No Weakness, No Numbness, No Paresthesias, No Loss of  Consciousness, No Syncope, No Dizziness, No Headache, No Coordination  Changes, No Recent Falls  Psych:  No Anxiety/Panic, No Depression, No Insomnia, No Personality  Changes, No Delusions, No Rumination, No SI/HI/AH/VH, No Social Issues,  No Memory Changes, No Violence/Abuse Hx., No Eating Concerns  Heme/Lymph:  No Bruising, No Bleeding, No Transfusions History, No Lymphadenopathy  Endocrine:  No Polyuria, No Polydipsia, No Temperature Intolerance    O:    ICU Vital Signs Last 24 Hrs  T(C): 36.5 (2023 11:03), Max: 36.7 (2023 23:18)  T(F): 97.7 (2023 11:03), Max: 98.1 (2023 23:18)  HR: 91 (2023 11:03) (78 - 95)  BP: 106/57 (2023 11:03) (99/57 - 106/57)  BP(mean): --  ABP: --  ABP(mean): --  RR: 17 (2023 11:04) (17 - 18)  SpO2: 96% (2023 11:04) (96% - 98%)    O2 Parameters below as of 2023 11:04  Patient On (Oxygen Delivery Method): room air                I&O's Detail          PE:    Adult lying in bed  No JVD trachea midline  Normocephalic, atraumatic  S1S2+  CTA B/L  Abd soft NTND  No leg swelling/edema noted  Awake and alert  Skin pink, warm    LABS:    CBC Full  -  ( 2023 07:20 )  WBC Count : 13.67 K/uL  RBC Count : 3.84 M/uL  Hemoglobin : 9.5 g/dL  Hematocrit : 30.1 %  Platelet Count - Automated : 565 K/uL  Mean Cell Volume : 78.4 fl  Mean Cell Hemoglobin : 24.7 pg  Mean Cell Hemoglobin Concentration : 31.6 gm/dL  Auto Neutrophil # : x  Auto Lymphocyte # : x  Auto Monocyte # : x  Auto Eosinophil # : x  Auto Basophil # : x  Auto Neutrophil % : x  Auto Lymphocyte % : x  Auto Monocyte % : x  Auto Eosinophil % : x  Auto Basophil % : x    11-25    137  |  103  |  14  ----------------------------<  115<H>  4.5   |  28  |  0.56    Ca    9.0      2023 07:20        Urinalysis Basic - ( 2023 07:20 )    Color: x / Appearance: x / SG: x / pH: x  Gluc: 115 mg/dL / Ketone: x  / Bili: x / Urobili: x   Blood: x / Protein: x / Nitrite: x   Leuk Esterase: x / RBC: x / WBC x   Sq Epi: x / Non Sq Epi: x / Bacteria: x      CAPILLARY BLOOD GLUCOSE

## 2023-11-26 NOTE — PROGRESS NOTE ADULT - SUBJECTIVE AND OBJECTIVE BOX
Subjective:    pat better, chest pain is improving, wbc trending down.    Home Medications:  Albuterol (Eqv-ProAir HFA) 90 mcg/inh inhalation aerosol: 2 puff(s) inhaled every 6 hours (17 Nov 2023 20:15)  albuterol 2.5 mg/3 mL (0.083%) inhalation solution: 3 milliliter(s) by nebulizer 4 times a day (17 Nov 2023 20:15)  folic acid 1 mg oral tablet: 1 tab(s) orally once a day (17 Nov 2023 20:15)  naproxen 500 mg oral tablet: 1 tab(s) orally once a day as needed for (17 Nov 2023 20:15)    MEDICATIONS  (STANDING):  acetaminophen   IVPB .. 675 milliGRAM(s) IV Intermittent once  albuterol    90 MICROgram(s) HFA Inhaler 2 Puff(s) Inhalation every 6 hours  budesonide 160 MICROgram(s)/formoterol 4.5 MICROgram(s) Inhaler 2 Puff(s) Inhalation two times a day  cefepime  Injectable. 2000 milliGRAM(s) IV Push every 8 hours  enoxaparin Injectable 40 milliGRAM(s) SubCutaneous every 24 hours  folic acid 1 milliGRAM(s) Oral daily  methylPREDNISolone sodium succinate Injectable 40 milliGRAM(s) IV Push every 12 hours    MEDICATIONS  (PRN):  acetaminophen     Tablet .. 650 milliGRAM(s) Oral every 6 hours PRN Temp greater or equal to 38C (100.4F), Mild Pain (1 - 3)  albuterol    90 MICROgram(s) HFA Inhaler 2 Puff(s) Inhalation every 6 hours PRN Shortness of Breath and/or Wheezing  aluminum hydroxide/magnesium hydroxide/simethicone Suspension 30 milliLiter(s) Oral every 4 hours PRN Dyspepsia  guaiFENesin  milliGRAM(s) Oral every 12 hours PRN Cough  melatonin 3 milliGRAM(s) Oral at bedtime PRN Insomnia  ondansetron Injectable 4 milliGRAM(s) IV Push every 6 hours PRN Nausea and/or Vomiting      Allergies    No Known Allergies    Intolerances        Vital Signs Last 24 Hrs  T(C): 36.7 (26 Nov 2023 16:35), Max: 36.7 (25 Nov 2023 23:18)  T(F): 98.1 (26 Nov 2023 16:35), Max: 98.1 (25 Nov 2023 23:18)  HR: 79 (26 Nov 2023 16:35) (78 - 91)  BP: 98/53 (26 Nov 2023 16:35) (98/53 - 106/57)  BP(mean): --  RR: 17 (26 Nov 2023 16:35) (17 - 18)  SpO2: 97% (26 Nov 2023 16:35) (96% - 98%)    Parameters below as of 26 Nov 2023 16:35  Patient On (Oxygen Delivery Method): room air          PHYSICAL EXAMINATION:    NECK:  Supple. No lymphadenopathy. Jugular venous pressure not elevated. Carotids equal.   HEART:   The cardiac impulse has a normal quality. Reg., Nl S1 and S2.  There are no murmurs, rubs or gallops noted  CHEST:  Chest crackles to auscultation. Normal respiratory effort.  ABDOMEN:  Soft and nontender.   EXTREMITIES:  There is no edema.       LABS:                        9.5    13.67 )-----------( 565      ( 25 Nov 2023 07:20 )             30.1     11-25    137  |  103  |  14  ----------------------------<  115<H>  4.5   |  28  |  0.56    Ca    9.0      25 Nov 2023 07:20        Urinalysis Basic - ( 25 Nov 2023 07:20 )    Color: x / Appearance: x / SG: x / pH: x  Gluc: 115 mg/dL / Ketone: x  / Bili: x / Urobili: x   Blood: x / Protein: x / Nitrite: x   Leuk Esterase: x / RBC: x / WBC x   Sq Epi: x / Non Sq Epi: x / Bacteria: x

## 2023-11-26 NOTE — PROGRESS NOTE ADULT - ASSESSMENT
33y Female with no significant PMH presented in ED with c/o worsening left chest pain since yesterday.  Her chest pain is mainly located in left upper chest.  She also has on and off fever, cough and chills for last couple weeks. Her appetite is also low, and has lost about 4-5 kg of body weight in last 5-6 months.   She denies any night sweats or diaphoresis. As per her , she has some lung problems going on for last few years while she was in [place]. [place] recently come to [place] about 6 months ago.  She denies any personal or family history of tuberculosis.     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 Diagnostic considerations include cystic fibrosis, Lucho-Coleman syndrome, allergic bronchopulmonary aspergillosis (ABPA).  Concern for possible MTB with clinical presentation and radiological findings - airborne precautions and check  Sinus tachycardia likely due to pneumonia  Hypochromic microcytic  anemia    PLAN:    Pulmonary slow improvements  Bronch next week  Follow cultures/ Sputum AFB-neg  Serum serology-aspergillus, alph1, antitrypsin, & VEGF-D testing  Ct surgery fu  Iv cefepime  Iv solu-medrols 40mg q12hr  Albuterol prn  Robitussin prn  TSH level  Spoke to elaina &  in [place]ian/ spoke to Dr Mak again today in detail-friend Doctor  DVT prophylaxis: Lovenox sq daily

## 2023-11-26 NOTE — PROGRESS NOTE ADULT - ASSESSMENT
A:    33yFemale  HD # 10    Here for:    1. PNA  2. Acute hypoxic resp failure  3. SOB    This patient requires a low lvl of care for support     P:    No resp distress    Plan for bronch Tues 11/28  Orders placed  NPO order placed    Dispo: Cont care.    Date of entry of this note is equal to the date of services rendered    TOTAL CARE TIME: 25+ minutes     Note:  This time INCLUDES time spent directly as this patient's bedside with evaluation and management, review of chart including review of laboratory and imaging studies, interpretation of vital signs and cardiac output measurements, any necessary ventilator management, and time spent discussing plan of care with patient and family, including goals of care discussion. This also includes time spent in multidisciplinary discussion with care team and various consultants to optimize treatment plan. This time may NOT include various procedures, which will be noted seperately.

## 2023-11-27 ENCOUNTER — APPOINTMENT (OUTPATIENT)
Dept: PULMONOLOGY | Facility: CLINIC | Age: 33
End: 2023-11-27

## 2023-11-27 PROBLEM — Z87.09 PERSONAL HISTORY OF OTHER DISEASES OF THE RESPIRATORY SYSTEM: Chronic | Status: ACTIVE | Noted: 2023-11-17

## 2023-11-27 PROBLEM — J18.9 PNEUMONIA, UNSPECIFIED ORGANISM: Chronic | Status: ACTIVE | Noted: 2023-11-17

## 2023-11-27 LAB
ABO RH CONFIRMATION: SIGNIFICANT CHANGE UP
ABO RH CONFIRMATION: SIGNIFICANT CHANGE UP
ANA TITR SER: NEGATIVE — SIGNIFICANT CHANGE UP
ANA TITR SER: NEGATIVE — SIGNIFICANT CHANGE UP
ANION GAP SERPL CALC-SCNC: 5 MMOL/L — SIGNIFICANT CHANGE UP (ref 5–17)
ANION GAP SERPL CALC-SCNC: 5 MMOL/L — SIGNIFICANT CHANGE UP (ref 5–17)
APTT BLD: 30.9 SEC — SIGNIFICANT CHANGE UP (ref 24.5–35.6)
APTT BLD: 30.9 SEC — SIGNIFICANT CHANGE UP (ref 24.5–35.6)
BLD GP AB SCN SERPL QL: SIGNIFICANT CHANGE UP
BLD GP AB SCN SERPL QL: SIGNIFICANT CHANGE UP
BUN SERPL-MCNC: 18 MG/DL — SIGNIFICANT CHANGE UP (ref 7–23)
BUN SERPL-MCNC: 18 MG/DL — SIGNIFICANT CHANGE UP (ref 7–23)
CALCIUM SERPL-MCNC: 9.3 MG/DL — SIGNIFICANT CHANGE UP (ref 8.5–10.1)
CALCIUM SERPL-MCNC: 9.3 MG/DL — SIGNIFICANT CHANGE UP (ref 8.5–10.1)
CHLORIDE SERPL-SCNC: 103 MMOL/L — SIGNIFICANT CHANGE UP (ref 96–108)
CHLORIDE SERPL-SCNC: 103 MMOL/L — SIGNIFICANT CHANGE UP (ref 96–108)
CO2 SERPL-SCNC: 31 MMOL/L — SIGNIFICANT CHANGE UP (ref 22–31)
CO2 SERPL-SCNC: 31 MMOL/L — SIGNIFICANT CHANGE UP (ref 22–31)
CREAT SERPL-MCNC: 0.52 MG/DL — SIGNIFICANT CHANGE UP (ref 0.5–1.3)
CREAT SERPL-MCNC: 0.52 MG/DL — SIGNIFICANT CHANGE UP (ref 0.5–1.3)
EGFR: 127 ML/MIN/1.73M2 — SIGNIFICANT CHANGE UP
EGFR: 127 ML/MIN/1.73M2 — SIGNIFICANT CHANGE UP
GLUCOSE SERPL-MCNC: 104 MG/DL — HIGH (ref 70–99)
GLUCOSE SERPL-MCNC: 104 MG/DL — HIGH (ref 70–99)
HCG SERPL-ACNC: <1 MIU/ML — SIGNIFICANT CHANGE UP
HCG SERPL-ACNC: <1 MIU/ML — SIGNIFICANT CHANGE UP
HCT VFR BLD CALC: 31.7 % — LOW (ref 34.5–45)
HCT VFR BLD CALC: 31.7 % — LOW (ref 34.5–45)
HGB BLD-MCNC: 10 G/DL — LOW (ref 11.5–15.5)
HGB BLD-MCNC: 10 G/DL — LOW (ref 11.5–15.5)
INR BLD: 1.02 RATIO — SIGNIFICANT CHANGE UP (ref 0.85–1.18)
INR BLD: 1.02 RATIO — SIGNIFICANT CHANGE UP (ref 0.85–1.18)
MCHC RBC-ENTMCNC: 25 PG — LOW (ref 27–34)
MCHC RBC-ENTMCNC: 25 PG — LOW (ref 27–34)
MCHC RBC-ENTMCNC: 31.5 GM/DL — LOW (ref 32–36)
MCHC RBC-ENTMCNC: 31.5 GM/DL — LOW (ref 32–36)
MCV RBC AUTO: 79.3 FL — LOW (ref 80–100)
MCV RBC AUTO: 79.3 FL — LOW (ref 80–100)
PLATELET # BLD AUTO: 557 K/UL — HIGH (ref 150–400)
PLATELET # BLD AUTO: 557 K/UL — HIGH (ref 150–400)
POTASSIUM SERPL-MCNC: 4.1 MMOL/L — SIGNIFICANT CHANGE UP (ref 3.5–5.3)
POTASSIUM SERPL-MCNC: 4.1 MMOL/L — SIGNIFICANT CHANGE UP (ref 3.5–5.3)
POTASSIUM SERPL-SCNC: 4.1 MMOL/L — SIGNIFICANT CHANGE UP (ref 3.5–5.3)
POTASSIUM SERPL-SCNC: 4.1 MMOL/L — SIGNIFICANT CHANGE UP (ref 3.5–5.3)
PROTHROM AB SERPL-ACNC: 11.5 SEC — SIGNIFICANT CHANGE UP (ref 9.5–13)
PROTHROM AB SERPL-ACNC: 11.5 SEC — SIGNIFICANT CHANGE UP (ref 9.5–13)
RBC # BLD: 4 M/UL — SIGNIFICANT CHANGE UP (ref 3.8–5.2)
RBC # BLD: 4 M/UL — SIGNIFICANT CHANGE UP (ref 3.8–5.2)
RBC # FLD: 18.6 % — HIGH (ref 10.3–14.5)
RBC # FLD: 18.6 % — HIGH (ref 10.3–14.5)
SODIUM SERPL-SCNC: 139 MMOL/L — SIGNIFICANT CHANGE UP (ref 135–145)
SODIUM SERPL-SCNC: 139 MMOL/L — SIGNIFICANT CHANGE UP (ref 135–145)
VEGF SERPL-MCNC: 70 PG/ML — SIGNIFICANT CHANGE UP (ref 0–115)
VEGF SERPL-MCNC: 70 PG/ML — SIGNIFICANT CHANGE UP (ref 0–115)
WBC # BLD: 19.68 K/UL — HIGH (ref 3.8–10.5)
WBC # BLD: 19.68 K/UL — HIGH (ref 3.8–10.5)
WBC # FLD AUTO: 19.68 K/UL — HIGH (ref 3.8–10.5)
WBC # FLD AUTO: 19.68 K/UL — HIGH (ref 3.8–10.5)

## 2023-11-27 PROCEDURE — 99232 SBSQ HOSP IP/OBS MODERATE 35: CPT

## 2023-11-27 RX ADMIN — Medication 1 MILLIGRAM(S): at 09:53

## 2023-11-27 RX ADMIN — BUDESONIDE AND FORMOTEROL FUMARATE DIHYDRATE 2 PUFF(S): 160; 4.5 AEROSOL RESPIRATORY (INHALATION) at 09:35

## 2023-11-27 RX ADMIN — BUDESONIDE AND FORMOTEROL FUMARATE DIHYDRATE 2 PUFF(S): 160; 4.5 AEROSOL RESPIRATORY (INHALATION) at 20:24

## 2023-11-27 RX ADMIN — CEFEPIME 2000 MILLIGRAM(S): 1 INJECTION, POWDER, FOR SOLUTION INTRAMUSCULAR; INTRAVENOUS at 05:12

## 2023-11-27 RX ADMIN — CEFEPIME 2000 MILLIGRAM(S): 1 INJECTION, POWDER, FOR SOLUTION INTRAMUSCULAR; INTRAVENOUS at 21:38

## 2023-11-27 RX ADMIN — CEFEPIME 2000 MILLIGRAM(S): 1 INJECTION, POWDER, FOR SOLUTION INTRAMUSCULAR; INTRAVENOUS at 14:26

## 2023-11-27 RX ADMIN — ALBUTEROL 2 PUFF(S): 90 AEROSOL, METERED ORAL at 13:52

## 2023-11-27 RX ADMIN — ENOXAPARIN SODIUM 40 MILLIGRAM(S): 100 INJECTION SUBCUTANEOUS at 21:37

## 2023-11-27 RX ADMIN — Medication 40 MILLIGRAM(S): at 09:55

## 2023-11-27 RX ADMIN — ALBUTEROL 2 PUFF(S): 90 AEROSOL, METERED ORAL at 09:35

## 2023-11-27 RX ADMIN — ALBUTEROL 2 PUFF(S): 90 AEROSOL, METERED ORAL at 20:24

## 2023-11-27 NOTE — PROGRESS NOTE ADULT - SUBJECTIVE AND OBJECTIVE BOX
Subjective:    pat better, cough improving, sitting in bed, no respiratory distress.    Home Medications:  Albuterol (Eqv-ProAir HFA) 90 mcg/inh inhalation aerosol: 2 puff(s) inhaled every 6 hours (17 Nov 2023 20:15)  albuterol 2.5 mg/3 mL (0.083%) inhalation solution: 3 milliliter(s) by nebulizer 4 times a day (17 Nov 2023 20:15)  folic acid 1 mg oral tablet: 1 tab(s) orally once a day (17 Nov 2023 20:15)  naproxen 500 mg oral tablet: 1 tab(s) orally once a day as needed for (17 Nov 2023 20:15)    MEDICATIONS  (STANDING):  acetaminophen   IVPB .. 675 milliGRAM(s) IV Intermittent once  albuterol    90 MICROgram(s) HFA Inhaler 2 Puff(s) Inhalation every 6 hours  budesonide 160 MICROgram(s)/formoterol 4.5 MICROgram(s) Inhaler 2 Puff(s) Inhalation two times a day  cefepime  Injectable. 2000 milliGRAM(s) IV Push every 8 hours  enoxaparin Injectable 40 milliGRAM(s) SubCutaneous every 24 hours  folic acid 1 milliGRAM(s) Oral daily  methylPREDNISolone sodium succinate Injectable 40 milliGRAM(s) IV Push every 12 hours    MEDICATIONS  (PRN):  acetaminophen     Tablet .. 650 milliGRAM(s) Oral every 6 hours PRN Temp greater or equal to 38C (100.4F), Mild Pain (1 - 3)  albuterol    90 MICROgram(s) HFA Inhaler 2 Puff(s) Inhalation every 6 hours PRN Shortness of Breath and/or Wheezing  aluminum hydroxide/magnesium hydroxide/simethicone Suspension 30 milliLiter(s) Oral every 4 hours PRN Dyspepsia  guaiFENesin  milliGRAM(s) Oral every 12 hours PRN Cough  melatonin 3 milliGRAM(s) Oral at bedtime PRN Insomnia  ondansetron Injectable 4 milliGRAM(s) IV Push every 6 hours PRN Nausea and/or Vomiting      Allergies    No Known Allergies    Intolerances        Vital Signs Last 24 Hrs  T(C): 36.7 (27 Nov 2023 08:39), Max: 36.7 (26 Nov 2023 16:35)  T(F): 98 (27 Nov 2023 08:39), Max: 98.1 (26 Nov 2023 16:35)  HR: 76 (27 Nov 2023 09:30) (65 - 87)  BP: 99/59 (27 Nov 2023 08:39) (98/53 - 103/59)  BP(mean): 69 (26 Nov 2023 21:30) (69 - 69)  RR: 18 (27 Nov 2023 08:39) (17 - 18)  SpO2: 95% (27 Nov 2023 08:39) (95% - 98%)    Parameters below as of 27 Nov 2023 08:39  Patient On (Oxygen Delivery Method): room air          PHYSICAL EXAMINATION:    NECK:  Supple. No lymphadenopathy. Jugular venous pressure not elevated. Carotids equal.   HEART:   The cardiac impulse has a normal quality. Reg., Nl S1 and S2.  There are no murmurs, rubs or gallops noted  CHEST:  Chest crackles to auscultation. Normal respiratory effort.  ABDOMEN:  Soft and nontender.   EXTREMITIES:  There is no edema.       LABS:                        10.0   19.68 )-----------( 557      ( 27 Nov 2023 08:08 )             31.7     11-27    139  |  103  |  18  ----------------------------<  104<H>  4.1   |  31  |  0.52    Ca    9.3      27 Nov 2023 08:08      PT/INR - ( 27 Nov 2023 08:08 )   PT: 11.5 sec;   INR: 1.02 ratio         PTT - ( 27 Nov 2023 08:08 )  PTT:30.9 sec  Urinalysis Basic - ( 27 Nov 2023 08:08 )    Color: x / Appearance: x / SG: x / pH: x  Gluc: 104 mg/dL / Ketone: x  / Bili: x / Urobili: x   Blood: x / Protein: x / Nitrite: x   Leuk Esterase: x / RBC: x / WBC x   Sq Epi: x / Non Sq Epi: x / Bacteria: x

## 2023-11-27 NOTE — PROGRESS NOTE ADULT - ASSESSMENT
33y Female no significant PMH admitted 11/17/23 with c/o worsening left chest pain found to have CHARLY PNA and bronchiectasis. Called for bronchoscopy AFB x 3 negative.     Plan   NPO p midnight   T&S, HCG- neg   Plan for Bronchoscopy with Dr. Stein Tuesday   Cont ABX and therapy as per medical teams  Pulm following   Incentive spirometry   D/W Dr. Stein

## 2023-11-27 NOTE — PROGRESS NOTE ADULT - ASSESSMENT
33y Female with no significant PMH presented in ED with c/o worsening left chest pain since yesterday.  Her chest pain is mainly located in left upper chest.  She also has on and off fever, cough and chills for last couple weeks. Her appetite is also low, and has lost about 4-5 kg of body weight in last 5-6 months.   She denies any night sweats or diaphoresis. As per her , she has some lung problems going on for last few years while she was in [place]. She has recently come to [place] about 6 [place] ago.  She denies any personal or family history of tuberculosis.     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 Diagnostic considerations include cystic fibrosis, Lucho-Coleman syndrome, allergic bronchopulmonary aspergillosis (ABPA).  Concern for possible MTB with clinical presentation and radiological findings - airborne precautions and check  Sinus tachycardia likely due to pneumonia  Hypochromic microcytic  anemia    PLAN:    Pulmonary slow improvements  Bronch tomorrow  Follow cultures/ Sputum AFB-neg  Serum serology-aspergillus, alph1, antitrypsin, & VEGF-D testing  Ct surgery fu  Iv cefepime  taper Iv solu-medrols 40mg qdaily  Albuterol prn  Robitussin prn  TSH level  Spoke to pat &  in Armenian/ spoke to Dr Mak again today in detail-friend Doctor  DVT prophylaxis: Lovenox sq daily

## 2023-11-27 NOTE — PROGRESS NOTE ADULT - SUBJECTIVE AND OBJECTIVE BOX
Subjective:  Pt in bed NAD no issues overnight     T(C): 36.7 (11-27-23 @ 08:39), Max: 36.7 (11-26-23 @ 16:35)  HR: 76 (11-27-23 @ 09:30) (65 - 87)  BP: 99/59 (11-27-23 @ 08:39) (98/53 - 103/59)  ABP: --  ABP(mean): --  RR: 18 (11-27-23 @ 08:39) (17 - 18)  SpO2: 95% (11-27-23 @ 08:39) (95% - 97%) RA   Wt(kg): --  CVP(mm Hg): --  CO: --  CI: --  PA: --                                              Tele: SR             11-27    139  |  103  |  18  ----------------------------<  104<H>  4.1   |  31  |  0.52    Ca    9.3      27 Nov 2023 08:08                                 10.0   19.68 )-----------( 557      ( 27 Nov 2023 08:08 )             31.7        PT/INR - ( 27 Nov 2023 08:08 )   PT: 11.5 sec;   INR: 1.02 ratio         PTT - ( 27 Nov 2023 08:08 )  PTT:30.9 sec         CAPILLARY BLOOD GLUCOSE    < from: CT Chest No Cont (11.24.23 @ 12:58) >  IMPRESSION:  Large left upper lobe consolidation is slightly decreased in size   compared to the prior study with areas of interval lucency. New patchy   left upper lobe opacity.    < end of copied text >                     Exam   Neuro:  Alert Awake NAD   Pulm: decreased at bases   CV: RRR  Abd: soft   Extremities: warm          Assessment:  33yFemale    with PAST MEDICAL & SURGICAL HISTORY:  History of lung disease      Pneumonia      No pertinent past surgical history            Plan:

## 2023-11-27 NOTE — PROGRESS NOTE ADULT - SUBJECTIVE AND OBJECTIVE BOX
chief complaint: SOB / CHARLY pneumonia and B/L bronchiectasis (2023 18:36)    HPI:    The patient is a 33y Female with no significant PMH presented in ED with c/o worsening left chest pain since yesterday.  Her chest pain is mainly located in left upper chest.  She also has on and off fever, cough and chills for last couple weeks. Her appetite is also low, and has lost about 4-5 kg of body weight in last 5-6 months.   She denies any night sweats or diaphoresis. As per her , she has some lung problems going on for last few years while she was in [place]. She has recently come to [place] about 6 [place] ago.  She denies any personal or family history of tuberculosis.   Upon arrival in ED, she was tachycardic with HR in 140s but afebrile. She is saturating well on RA.   Sig labs: WBC 22.12k, N 86%, Lactate 1.8,  D-dimer 480,  Hb11.6, MCV 74, RDW 16, . Lipase 14 and Mg 2.1.  CXR CHARLY consolidation.  CT angio chest: Negative for PE, but shows CHARLY pneumonia and B/L extensive bronchiectasis.  EK bpm in sinus tachycardia.    S:  : Lying in bed, comfortable.  Spoke to family via phone in english, discussed plan of care, states feeling better overall.  Discussed plan for bronch Tuesday.  : COmfortable, no new events, status quo, due for bronch tomorrow.  Spoke to family via phone in english to update on plan of care.    REVIEW OF SYSTEMS: All other review of systems is negative unless indicated above.      Vital Signs Last 24 Hrs  T(C): 36.7 (2023 08:39), Max: 36.7 (2023 16:35)  T(F): 98 (2023 08:39), Max: 98.1 (2023 16:35)  HR: 76 (2023 09:30) (65 - 87)  BP: 99/59 (2023 08:39) (98/53 - 103/59)  BP(mean): 69 (2023 21:30) (69 - 69)  RR: 18 (2023 08:39) (17 - 18)  SpO2: 95% (2023 08:39) (95% - 98%)    Parameters below as of 2023 08:39  Patient On (Oxygen Delivery Method): room air          PHYSICAL EXAM:    Constitutional: NAD, awake and alert  HEENT: PERR, EOMI, Normal Hearing, MMM  Neck: Soft and supple  Respiratory: Breath sounds are clear bilaterally, No wheezing, rales or rhonchi  Cardiovascular: S1 and S2, regular rate and rhythm, no Murmurs, gallops or rubs  Gastrointestinal: Bowel Sounds present, soft, nontender, nondistended, no guarding, no rebound  Extremities: No peripheral edema  Neurological: A/O x 3, no focal deficits in my limited exam      MEDICATIONS  (STANDING):  acetaminophen   IVPB .. 675 milliGRAM(s) IV Intermittent once  albuterol    90 MICROgram(s) HFA Inhaler 2 Puff(s) Inhalation every 6 hours  budesonide 160 MICROgram(s)/formoterol 4.5 MICROgram(s) Inhaler 2 Puff(s) Inhalation two times a day  cefepime  Injectable. 2000 milliGRAM(s) IV Push every 8 hours  enoxaparin Injectable 40 milliGRAM(s) SubCutaneous every 24 hours  folic acid 1 milliGRAM(s) Oral daily  methylPREDNISolone sodium succinate Injectable 40 milliGRAM(s) IV Push daily    MEDICATIONS  (PRN):  acetaminophen     Tablet .. 650 milliGRAM(s) Oral every 6 hours PRN Temp greater or equal to 38C (100.4F), Mild Pain (1 - 3)  albuterol    90 MICROgram(s) HFA Inhaler 2 Puff(s) Inhalation every 6 hours PRN Shortness of Breath and/or Wheezing  aluminum hydroxide/magnesium hydroxide/simethicone Suspension 30 milliLiter(s) Oral every 4 hours PRN Dyspepsia  guaiFENesin  milliGRAM(s) Oral every 12 hours PRN Cough  melatonin 3 milliGRAM(s) Oral at bedtime PRN Insomnia  ondansetron Injectable 4 milliGRAM(s) IV Push every 6 hours PRN Nausea and/or Vomiting                                10.0   19.68 )-----------( 557      ( 2023 08:08 )             31.7     11-    139  |  103  |  18  ----------------------------<  104<H>  4.1   |  31  |  0.52    Ca    9.3      2023 08:08      CAPILLARY BLOOD GLUCOSE          PT/INR - ( 2023 08:08 )   PT: 11.5 sec;   INR: 1.02 ratio         PTT - ( 2023 08:08 )  PTT:30.9 sec  Urinalysis Basic - ( 2023 08:08 )    Color: x / Appearance: x / SG: x / pH: x  Gluc: 104 mg/dL / Ketone: x  / Bili: x / Urobili: x   Blood: x / Protein: x / Nitrite: x   Leuk Esterase: x / RBC: x / WBC x   Sq Epi: x / Non Sq Epi: x / Bacteria: x          A/P:  #Sepsis due to CHARLY pneumonia / 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   - completed course of doxycycline   - Cultures neg  - Serum serology-aspergillus, alph1, antitrypsin, & VEGF-D testing noted  - continue steroids as is for now BID  - bronchoscopy on Tuesday      # VTE Px: Lovenox sq daily.    fam informed -[phone]

## 2023-11-28 ENCOUNTER — RESULT REVIEW (OUTPATIENT)
Age: 33
End: 2023-11-28

## 2023-11-28 ENCOUNTER — APPOINTMENT (OUTPATIENT)
Dept: THORACIC SURGERY | Facility: HOSPITAL | Age: 33
End: 2023-11-28

## 2023-11-28 ENCOUNTER — TRANSCRIPTION ENCOUNTER (OUTPATIENT)
Age: 33
End: 2023-11-28

## 2023-11-28 LAB
GALACTOMANNAN AG SERPL-ACNC: 0.05 INDEX — SIGNIFICANT CHANGE UP (ref 0–0.49)
GALACTOMANNAN AG SERPL-ACNC: 0.05 INDEX — SIGNIFICANT CHANGE UP (ref 0–0.49)

## 2023-11-28 PROCEDURE — 99233 SBSQ HOSP IP/OBS HIGH 50: CPT

## 2023-11-28 PROCEDURE — 31623 DX BRONCHOSCOPE/BRUSH: CPT

## 2023-11-28 PROCEDURE — 31624 DX BRONCHOSCOPE/LAVAGE: CPT

## 2023-11-28 PROCEDURE — 71045 X-RAY EXAM CHEST 1 VIEW: CPT | Mod: 26

## 2023-11-28 PROCEDURE — 88108 CYTOPATH CONCENTRATE TECH: CPT | Mod: 26

## 2023-11-28 PROCEDURE — 99231 SBSQ HOSP IP/OBS SF/LOW 25: CPT

## 2023-11-28 PROCEDURE — 88305 TISSUE EXAM BY PATHOLOGIST: CPT | Mod: 26

## 2023-11-28 PROCEDURE — 31625 BRONCHOSCOPY W/BIOPSY(S): CPT

## 2023-11-28 RX ORDER — SODIUM CHLORIDE 9 MG/ML
1000 INJECTION, SOLUTION INTRAVENOUS
Refills: 0 | Status: DISCONTINUED | OUTPATIENT
Start: 2023-11-28 | End: 2023-11-28

## 2023-11-28 RX ORDER — FENTANYL CITRATE 50 UG/ML
50 INJECTION INTRAVENOUS
Refills: 0 | Status: DISCONTINUED | OUTPATIENT
Start: 2023-11-28 | End: 2023-11-28

## 2023-11-28 RX ORDER — OXYCODONE HYDROCHLORIDE 5 MG/1
5 TABLET ORAL ONCE
Refills: 0 | Status: DISCONTINUED | OUTPATIENT
Start: 2023-11-28 | End: 2023-11-28

## 2023-11-28 RX ORDER — ONDANSETRON 8 MG/1
4 TABLET, FILM COATED ORAL ONCE
Refills: 0 | Status: DISCONTINUED | OUTPATIENT
Start: 2023-11-28 | End: 2023-11-28

## 2023-11-28 RX ADMIN — CEFEPIME 2000 MILLIGRAM(S): 1 INJECTION, POWDER, FOR SOLUTION INTRAMUSCULAR; INTRAVENOUS at 05:11

## 2023-11-28 RX ADMIN — Medication 1 MILLIGRAM(S): at 15:26

## 2023-11-28 RX ADMIN — Medication 650 MILLIGRAM(S): at 15:26

## 2023-11-28 RX ADMIN — Medication 600 MILLIGRAM(S): at 15:26

## 2023-11-28 RX ADMIN — Medication 650 MILLIGRAM(S): at 16:26

## 2023-11-28 RX ADMIN — Medication 40 MILLIGRAM(S): at 09:32

## 2023-11-28 RX ADMIN — BUDESONIDE AND FORMOTEROL FUMARATE DIHYDRATE 2 PUFF(S): 160; 4.5 AEROSOL RESPIRATORY (INHALATION) at 21:52

## 2023-11-28 RX ADMIN — ALBUTEROL 2 PUFF(S): 90 AEROSOL, METERED ORAL at 21:51

## 2023-11-28 RX ADMIN — ENOXAPARIN SODIUM 40 MILLIGRAM(S): 100 INJECTION SUBCUTANEOUS at 21:48

## 2023-11-28 RX ADMIN — ALBUTEROL 2 PUFF(S): 90 AEROSOL, METERED ORAL at 08:23

## 2023-11-28 RX ADMIN — Medication 3 MILLIGRAM(S): at 21:48

## 2023-11-28 RX ADMIN — BUDESONIDE AND FORMOTEROL FUMARATE DIHYDRATE 2 PUFF(S): 160; 4.5 AEROSOL RESPIRATORY (INHALATION) at 08:22

## 2023-11-28 NOTE — PROGRESS NOTE ADULT - SUBJECTIVE AND OBJECTIVE BOX
chief complaint: SOB / CHARLY pneumonia and B/L bronchiectasis (2023 18:36)    HPI:    The patient is a 33y Female with no significant PMH presented in ED with c/o worsening left chest pain since yesterday.  Her chest pain is mainly located in left upper chest.  She also has on and off fever, cough and chills for last couple weeks. Her appetite is also low, and has lost about 4-5 kg of body weight in last 5-6 months.   She denies any night sweats or diaphoresis. As per her , she has some lung problems going on for last few years while she was in [place]. [place] has recently come to [place] about [place] ago.  She denies any personal or family history of tuberculosis.   Upon arrival in ED, she was tachycardic with HR in 140s but afebrile. She is saturating well on RA.   Sig labs: WBC 22.12k, N 86%, Lactate 1.8,  D-dimer 480,  Hb11.6, MCV 74, RDW 16, . Lipase 14 and Mg 2.1.  CXR CHARLY consolidation.  CT angio chest: Negative for PE, but shows CHARLY pneumonia and B/L extensive bronchiectasis.  EK bpm in sinus tachycardia.    S:  : Lying in bed, comfortable.  Spoke to family via phone in english, discussed plan of care, states feeling better overall.  Discussed plan for bronch Tuesday.  : COmfortable, no new events, status quo, due for bronch tomorrow.  Spoke to family via phone in english to update on plan of care.  : Feels okay, Denies fever, chills, N, V, abd pain, CP, SOB.  Awaiting bronch for today.      REVIEW OF SYSTEMS: All other review of systems is negative unless indicated above.    Vital Signs Last 24 Hrs  T(C): 36.4 (2023 13:45), Max: 36.8 (2023 23:56)  T(F): 97.6 (2023 13:45), Max: 98.2 (2023 23:56)  HR: 118 (2023 13:45) (66 - 118)  BP: 108/97 (2023 13:45) (107/69 - 114/70)  BP(mean): --  RR: 23 (2023 13:45) (18 - 23)  SpO2: 97% (2023 13:45) (93% - 98%)    Parameters below as of 2023 13:45  Patient On (Oxygen Delivery Method): nasal cannula  O2 Flow (L/min): 3      PHYSICAL EXAM:    Constitutional: NAD, awake and alert  HEENT: PERR, EOMI, Normal Hearing, MMM  Neck: Soft and supple  Respiratory: Breath sounds are clear bilaterally, No wheezing, rales or rhonchi  Cardiovascular: S1 and S2, regular rate and rhythm, no Murmurs, gallops or rubs  Gastrointestinal: Bowel Sounds present, soft, nontender, nondistended, no guarding, no rebound  Extremities: No peripheral edema  Neurological: A/O x 3, no focal deficits in my limited exam      MEDICATIONS  (STANDING):  acetaminophen   IVPB .. 675 milliGRAM(s) IV Intermittent once  albuterol    90 MICROgram(s) HFA Inhaler 2 Puff(s) Inhalation every 6 hours  budesonide 160 MICROgram(s)/formoterol 4.5 MICROgram(s) Inhaler 2 Puff(s) Inhalation two times a day  enoxaparin Injectable 40 milliGRAM(s) SubCutaneous every 24 hours  folic acid 1 milliGRAM(s) Oral daily  lactated ringers. 1000 milliLiter(s) (100 mL/Hr) IV Continuous <Continuous>  methylPREDNISolone sodium succinate Injectable 40 milliGRAM(s) IV Push daily    MEDICATIONS  (PRN):  acetaminophen     Tablet .. 650 milliGRAM(s) Oral every 6 hours PRN Temp greater or equal to 38C (100.4F), Mild Pain (1 - 3)  albuterol    90 MICROgram(s) HFA Inhaler 2 Puff(s) Inhalation every 6 hours PRN Shortness of Breath and/or Wheezing  aluminum hydroxide/magnesium hydroxide/simethicone Suspension 30 milliLiter(s) Oral every 4 hours PRN Dyspepsia  fentaNYL    Injectable 50 MICROGram(s) IV Push every 10 minutes PRN Severe Pain (7 - 10)  guaiFENesin  milliGRAM(s) Oral every 12 hours PRN Cough  melatonin 3 milliGRAM(s) Oral at bedtime PRN Insomnia  ondansetron Injectable 4 milliGRAM(s) IV Push every 6 hours PRN Nausea and/or Vomiting  ondansetron Injectable 4 milliGRAM(s) IV Push once PRN Nausea and/or Vomiting  oxyCODONE    IR 5 milliGRAM(s) Oral once PRN Moderate Pain (4 - 6)                                10.0   19.68 )-----------( 557      ( 2023 08:08 )             31.7         139  |  103  |  18  ----------------------------<  104<H>  4.1   |  31  |  0.52    Ca    9.3      2023 08:08      CAPILLARY BLOOD GLUCOSE          PT/INR - ( 2023 08:08 )   PT: 11.5 sec;   INR: 1.02 ratio         PTT - ( 2023 08:08 )  PTT:30.9 sec  Urinalysis Basic - ( 2023 08:08 )    Color: x / Appearance: x / SG: x / pH: x  Gluc: 104 mg/dL / Ketone: x  / Bili: x / Urobili: x   Blood: x / Protein: x / Nitrite: x   Leuk Esterase: x / RBC: x / WBC x   Sq Epi: x / Non Sq Epi: x / Bacteria: x          A/P:    #Sepsis due to CHARLY pneumonia / 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    - completed course of doxycycline   - Cultures neg  - Serum serology-aspergillus, alph1, antitrypsin, & VEGF-D testing noted  - continue steroids as is for now BID --> qd  - bronchoscopy today      # VTE Px: Lovenox sq daily.    fam informed [phone]    Dispo:  -f/u pulm, thoracic, possible d/c planning soon.

## 2023-11-28 NOTE — BRIEF OPERATIVE NOTE - NSICDXBRIEFPROCEDURE_GEN_ALL_CORE_FT
PROCEDURES:  Bronchoscopy, with BAL 28-Nov-2023 13:36:27 bronch w BAL, brushings, biopsy CHARLY endobronchial Farrah Kowalski E

## 2023-11-28 NOTE — PROGRESS NOTE ADULT - SUBJECTIVE AND OBJECTIVE BOX
Subjective:  Pt seen, nPO for bronch    Vital Signs:  Vital Signs Last 24 Hrs  T(C): 36.6 (11-28-23 @ 07:54), Max: 36.8 (11-27-23 @ 23:56)  T(F): 97.8 (11-28-23 @ 07:54), Max: 98.2 (11-27-23 @ 23:56)  HR: 72 (11-28-23 @ 08:25) (66 - 100)  BP: 114/70 (11-28-23 @ 07:54) (107/69 - 114/70)  RR: 18 (11-28-23 @ 07:54) (18 - 18)  SpO2: 93% (11-28-23 @ 07:54) (93% - 98%) on (O2)    Telemetry/Alarms:    Relevant labs, radiology and Medications reviewed                        10.0   19.68 )-----------( 557      ( 27 Nov 2023 08:08 )             31.7     11-27    139  |  103  |  18  ----------------------------<  104<H>  4.1   |  31  |  0.52    Ca    9.3      27 Nov 2023 08:08      PT/INR - ( 27 Nov 2023 08:08 )   PT: 11.5 sec;   INR: 1.02 ratio         PTT - ( 27 Nov 2023 08:08 )  PTT:30.9 sec  MEDICATIONS  (STANDING):  acetaminophen   IVPB .. 675 milliGRAM(s) IV Intermittent once  albuterol    90 MICROgram(s) HFA Inhaler 2 Puff(s) Inhalation every 6 hours  budesonide 160 MICROgram(s)/formoterol 4.5 MICROgram(s) Inhaler 2 Puff(s) Inhalation two times a day  cefepime  Injectable. 2000 milliGRAM(s) IV Push every 8 hours  enoxaparin Injectable 40 milliGRAM(s) SubCutaneous every 24 hours  folic acid 1 milliGRAM(s) Oral daily  methylPREDNISolone sodium succinate Injectable 40 milliGRAM(s) IV Push daily    MEDICATIONS  (PRN):  acetaminophen     Tablet .. 650 milliGRAM(s) Oral every 6 hours PRN Temp greater or equal to 38C (100.4F), Mild Pain (1 - 3)  albuterol    90 MICROgram(s) HFA Inhaler 2 Puff(s) Inhalation every 6 hours PRN Shortness of Breath and/or Wheezing  aluminum hydroxide/magnesium hydroxide/simethicone Suspension 30 milliLiter(s) Oral every 4 hours PRN Dyspepsia  guaiFENesin  milliGRAM(s) Oral every 12 hours PRN Cough  melatonin 3 milliGRAM(s) Oral at bedtime PRN Insomnia  ondansetron Injectable 4 milliGRAM(s) IV Push every 6 hours PRN Nausea and/or Vomiting      Physical exam  General:  NAD                                                         Neuro: A+O x 3,                      Eyes: PERRL, EOMI   ENT: Normal exam of nasal/oral mucosa with absence of cyanosis.   Neck: supple, no JVD, trachea midline   Chest: , normal excursion, slight tachypnic  CV: RRR,   GI: soft, NT, ND, thin   Extremities: no edema  SKIN: warm, dry, intact     I&O's Summary    27 Nov 2023 07:01  -  28 Nov 2023 07:00  --------------------------------------------------------  IN: 480 mL / OUT: 0 mL / NET: 480 mL        Assessment  33y Female  w/ PAST MEDICAL & SURGICAL HISTORY:  History of lung disease      Pneumonia      No pertinent past surgical history      admitted with complaints of Patient is a 33y old  Female who presents with a chief complaint of CHARLY pneumonia and B/L bronchiectasis (27 Nov 2023 15:36)  .  33y Female no significant PMH admitted 11/17/23 with c/o worsening left chest pain found to have CHARLY PNA and bronchiectasis. Called for bronchoscopy AFB x 3 negative.     Plan   NPO Plan for Bronchoscopy with Dr. Sarita Bean ABX and therapy as per medical teams  Pulm following   Incentive spirometry     Discussed with Cardiothoracic Team at AM rounds.

## 2023-11-28 NOTE — PROGRESS NOTE ADULT - SUBJECTIVE AND OBJECTIVE BOX
Subjective:    pat s/p bronchoscopy with mucus plugging, no respiratory distress.    Home Medications:  Albuterol (Eqv-ProAir HFA) 90 mcg/inh inhalation aerosol: 2 puff(s) inhaled every 6 hours (17 Nov 2023 20:15)  albuterol 2.5 mg/3 mL (0.083%) inhalation solution: 3 milliliter(s) by nebulizer 4 times a day (17 Nov 2023 20:15)  folic acid 1 mg oral tablet: 1 tab(s) orally once a day (17 Nov 2023 20:15)  naproxen 500 mg oral tablet: 1 tab(s) orally once a day as needed for (17 Nov 2023 20:15)    MEDICATIONS  (STANDING):  acetaminophen   IVPB .. 675 milliGRAM(s) IV Intermittent once  albuterol    90 MICROgram(s) HFA Inhaler 2 Puff(s) Inhalation every 6 hours  budesonide 160 MICROgram(s)/formoterol 4.5 MICROgram(s) Inhaler 2 Puff(s) Inhalation two times a day  enoxaparin Injectable 40 milliGRAM(s) SubCutaneous every 24 hours  folic acid 1 milliGRAM(s) Oral daily  methylPREDNISolone sodium succinate Injectable 40 milliGRAM(s) IV Push daily    MEDICATIONS  (PRN):  acetaminophen     Tablet .. 650 milliGRAM(s) Oral every 6 hours PRN Temp greater or equal to 38C (100.4F), Mild Pain (1 - 3)  albuterol    90 MICROgram(s) HFA Inhaler 2 Puff(s) Inhalation every 6 hours PRN Shortness of Breath and/or Wheezing  aluminum hydroxide/magnesium hydroxide/simethicone Suspension 30 milliLiter(s) Oral every 4 hours PRN Dyspepsia  guaiFENesin  milliGRAM(s) Oral every 12 hours PRN Cough  melatonin 3 milliGRAM(s) Oral at bedtime PRN Insomnia  ondansetron Injectable 4 milliGRAM(s) IV Push every 6 hours PRN Nausea and/or Vomiting      Allergies    No Known Allergies    Intolerances        Vital Signs Last 24 Hrs  T(C): 36.6 (28 Nov 2023 15:00), Max: 36.8 (27 Nov 2023 23:56)  T(F): 97.9 (28 Nov 2023 15:00), Max: 98.2 (27 Nov 2023 23:56)  HR: 89 (28 Nov 2023 15:00) (66 - 118)  BP: 93/57 (28 Nov 2023 15:00) (93/57 - 114/70)  BP(mean): --  RR: 18 (28 Nov 2023 15:00) (12 - 23)  SpO2: 92% (28 Nov 2023 15:00) (92% - 98%)    Parameters below as of 28 Nov 2023 15:00  Patient On (Oxygen Delivery Method): room air          PHYSICAL EXAMINATION:    NECK:  Supple. No lymphadenopathy. Jugular venous pressure not elevated. Carotids equal.   HEART:   The cardiac impulse has a normal quality. Reg., Nl S1 and S2.  There are no murmurs, rubs or gallops noted  CHEST:  Chest cracklesr to auscultation. Normal respiratory effort.  ABDOMEN:  Soft and nontender.   EXTREMITIES:  There is no edema.       LABS:                        10.0   19.68 )-----------( 557      ( 27 Nov 2023 08:08 )             31.7     11-27    139  |  103  |  18  ----------------------------<  104<H>  4.1   |  31  |  0.52    Ca    9.3      27 Nov 2023 08:08      PT/INR - ( 27 Nov 2023 08:08 )   PT: 11.5 sec;   INR: 1.02 ratio         PTT - ( 27 Nov 2023 08:08 )  PTT:30.9 sec  Urinalysis Basic - ( 27 Nov 2023 08:08 )    Color: x / Appearance: x / SG: x / pH: x  Gluc: 104 mg/dL / Ketone: x  / Bili: x / Urobili: x   Blood: x / Protein: x / Nitrite: x   Leuk Esterase: x / RBC: x / WBC x   Sq Epi: x / Non Sq Epi: x / Bacteria: x             Subjective:    pat s/p bronchoscopy with mucus plugging, no respiratory distress.     Home Medications:  Albuterol (Eqv-ProAir HFA) 90 mcg/inh inhalation aerosol: 2 puff(s) inhaled every 6 hours (17 Nov 2023 20:15)  albuterol 2.5 mg/3 mL (0.083%) inhalation solution: 3 milliliter(s) by nebulizer 4 times a day (17 Nov 2023 20:15)  folic acid 1 mg oral tablet: 1 tab(s) orally once a day (17 Nov 2023 20:15)  naproxen 500 mg oral tablet: 1 tab(s) orally once a day as needed for (17 Nov 2023 20:15)    MEDICATIONS  (STANDING):  acetaminophen   IVPB .. 675 milliGRAM(s) IV Intermittent once  albuterol    90 MICROgram(s) HFA Inhaler 2 Puff(s) Inhalation every 6 hours  budesonide 160 MICROgram(s)/formoterol 4.5 MICROgram(s) Inhaler 2 Puff(s) Inhalation two times a day  enoxaparin Injectable 40 milliGRAM(s) SubCutaneous every 24 hours  folic acid 1 milliGRAM(s) Oral daily  methylPREDNISolone sodium succinate Injectable 40 milliGRAM(s) IV Push daily    MEDICATIONS  (PRN):  acetaminophen     Tablet .. 650 milliGRAM(s) Oral every 6 hours PRN Temp greater or equal to 38C (100.4F), Mild Pain (1 - 3)  albuterol    90 MICROgram(s) HFA Inhaler 2 Puff(s) Inhalation every 6 hours PRN Shortness of Breath and/or Wheezing  aluminum hydroxide/magnesium hydroxide/simethicone Suspension 30 milliLiter(s) Oral every 4 hours PRN Dyspepsia  guaiFENesin  milliGRAM(s) Oral every 12 hours PRN Cough  melatonin 3 milliGRAM(s) Oral at bedtime PRN Insomnia  ondansetron Injectable 4 milliGRAM(s) IV Push every 6 hours PRN Nausea and/or Vomiting      Allergies    No Known Allergies    Intolerances        Vital Signs Last 24 Hrs  T(C): 36.6 (28 Nov 2023 15:00), Max: 36.8 (27 Nov 2023 23:56)  T(F): 97.9 (28 Nov 2023 15:00), Max: 98.2 (27 Nov 2023 23:56)  HR: 89 (28 Nov 2023 15:00) (66 - 118)  BP: 93/57 (28 Nov 2023 15:00) (93/57 - 114/70)  BP(mean): --  RR: 18 (28 Nov 2023 15:00) (12 - 23)  SpO2: 92% (28 Nov 2023 15:00) (92% - 98%)    Parameters below as of 28 Nov 2023 15:00  Patient On (Oxygen Delivery Method): room air          PHYSICAL EXAMINATION:    NECK:  Supple. No lymphadenopathy. Jugular venous pressure not elevated. Carotids equal.   HEART:   The cardiac impulse has a normal quality. Reg., Nl S1 and S2.  There are no murmurs, rubs or gallops noted  CHEST:  Chest cracklesr to auscultation. Normal respiratory effort.  ABDOMEN:  Soft and nontender.   EXTREMITIES:  There is no edema.       LABS:                        10.0   19.68 )-----------( 557      ( 27 Nov 2023 08:08 )             31.7     11-27    139  |  103  |  18  ----------------------------<  104<H>  4.1   |  31  |  0.52    Ca    9.3      27 Nov 2023 08:08      PT/INR - ( 27 Nov 2023 08:08 )   PT: 11.5 sec;   INR: 1.02 ratio         PTT - ( 27 Nov 2023 08:08 )  PTT:30.9 sec  Urinalysis Basic - ( 27 Nov 2023 08:08 )    Color: x / Appearance: x / SG: x / pH: x  Gluc: 104 mg/dL / Ketone: x  / Bili: x / Urobili: x   Blood: x / Protein: x / Nitrite: x   Leuk Esterase: x / RBC: x / WBC x   Sq Epi: x / Non Sq Epi: x / Bacteria: x

## 2023-11-28 NOTE — PROGRESS NOTE ADULT - ASSESSMENT
33y Female with no significant PMH presented in ED with c/o worsening left chest pain since yesterday.  Her chest pain is mainly located in left upper chest.  She also has on and off fever, cough and chills for last couple weeks. Her appetite is also low, and has lost about 4-5 kg of body weight in last 5-6 months.   She denies any night sweats or diaphoresis. As per her , she has some lung problems going on for last few years while she was in [place]. [place] recently come to [place] about 6 [place] ago.  She denies any personal or family history of tuberculosis.     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 Diagnostic considerations include cystic fibrosis, Lucho-Coleman syndrome, allergic bronchopulmonary aspergillosis (ABPA).  Concern for possible MTB with clinical presentation and radiological findings - airborne precautions and check  Sinus tachycardia likely due to pneumonia  Hypochromic microcytic  anemia    PLAN:    Pulmonary slow improvements  S/P Bronch wait cultures  Follow cultures/ Sputum AFB-neg  Serum serology-aspergillus, alph1, antitrypsin, & VEGF-D testing  Ct surgery fu  Iv cefepime  IV solu-medrols 40mg qdaily  Albuterol prn  Robitussin prn  TSH level  Spoke to pat &  in Portuguese/ spoke to Dr Mak again today in detail-friend Doctor  DVT prophylaxis: Lovenox sq daily     33y Female with no significant PMH presented in ED with c/o worsening left chest pain since yesterday.  Her chest pain is mainly located in left upper chest.  She also has on and off fever, cough and chills for last couple weeks. Her appetite is also low, and has lost about 4-5 kg of body weight in last 5-6 months.   She denies any night sweats or diaphoresis. As per her , she has some lung problems going on for last few years while she was in [place]. [place] recently come to [place] about 6 [place] ago.  She denies any personal or family history of tuberculosis.     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 Diagnostic considerations include cystic fibrosis, Lucho-Coleman syndrome, allergic bronchopulmonary aspergillosis (ABPA).  Concern for possible MTB with clinical presentation and radiological findings - airborne precautions and check  Sinus tachycardia likely due to pneumonia  Hypochromic microcytic  anemia    PLAN:    Pulmonary slow improvements  S/P Bronch wait cultures  Follow cultures/ Sputum AFB-neg/IGE 9999, VEGF normal 70  Serum serology-aspergillus, alph1, antitrypsin, & VEGF-D testing  Ct surgery fu  Iv cefepime  IV solu-medrols 40mg qdaily  Albuterol prn  Robitussin prn  TSH level  Spoke to pat &  in Swedish/ spoke to Dr Mak again today in detail-friend Doctor  DVT prophylaxis: Lovenox sq daily

## 2023-11-29 LAB
GRAM STN FLD: SIGNIFICANT CHANGE UP
NIGHT BLUE STAIN TISS: SIGNIFICANT CHANGE UP
SPECIMEN SOURCE: SIGNIFICANT CHANGE UP
TROPONIN I, HIGH SENSITIVITY RESULT: <3 NG/L — SIGNIFICANT CHANGE UP
TROPONIN I, HIGH SENSITIVITY RESULT: <3 NG/L — SIGNIFICANT CHANGE UP

## 2023-11-29 PROCEDURE — 99231 SBSQ HOSP IP/OBS SF/LOW 25: CPT

## 2023-11-29 PROCEDURE — 93010 ELECTROCARDIOGRAM REPORT: CPT

## 2023-11-29 PROCEDURE — 99232 SBSQ HOSP IP/OBS MODERATE 35: CPT

## 2023-11-29 RX ADMIN — ALBUTEROL 2 PUFF(S): 90 AEROSOL, METERED ORAL at 10:51

## 2023-11-29 RX ADMIN — BUDESONIDE AND FORMOTEROL FUMARATE DIHYDRATE 2 PUFF(S): 160; 4.5 AEROSOL RESPIRATORY (INHALATION) at 10:51

## 2023-11-29 RX ADMIN — ALBUTEROL 2 PUFF(S): 90 AEROSOL, METERED ORAL at 20:35

## 2023-11-29 RX ADMIN — Medication 30 MILLILITER(S): at 21:45

## 2023-11-29 RX ADMIN — Medication 650 MILLIGRAM(S): at 21:45

## 2023-11-29 RX ADMIN — ALBUTEROL 2 PUFF(S): 90 AEROSOL, METERED ORAL at 15:55

## 2023-11-29 RX ADMIN — Medication 1 MILLIGRAM(S): at 09:50

## 2023-11-29 RX ADMIN — Medication 3 MILLIGRAM(S): at 21:45

## 2023-11-29 RX ADMIN — Medication 650 MILLIGRAM(S): at 22:45

## 2023-11-29 RX ADMIN — ENOXAPARIN SODIUM 40 MILLIGRAM(S): 100 INJECTION SUBCUTANEOUS at 21:45

## 2023-11-29 RX ADMIN — Medication 40 MILLIGRAM(S): at 09:50

## 2023-11-29 RX ADMIN — BUDESONIDE AND FORMOTEROL FUMARATE DIHYDRATE 2 PUFF(S): 160; 4.5 AEROSOL RESPIRATORY (INHALATION) at 20:35

## 2023-11-29 NOTE — PROGRESS NOTE ADULT - ASSESSMENT
33y Female no significant PMH admitted 11/17/23 with c/o worsening left chest pain found to have CHARLY PNA and bronchiectasis. Called for bronchoscopy AFB x 3 negative.  11/28 FB, BAL     Plan   Cult Pending   Cont ABX and therapy as per medical teams  Pulm following   Incentive spirometry   Pt stable to be discharged from thoracic standpoint  Pt to follow up with Pulm as outpatient     Discussed with Cardiothoracic Team at AM rounds.

## 2023-11-29 NOTE — CHART NOTE - NSCHARTNOTEFT_GEN_A_CORE
Called by nurse to evaluate patient. Patient reported chest discomfort to nurse. At time of evaluation, patient reports chest discomfort when she coughs and epigastric discomfort. Patient diagnosed with pneumonia. Chest pain likely related to pneumonia and cough.     T(C): 36.7 (11-29-23 @ 20:31), Max: 36.7 (11-29-23 @ 15:52)  HR: 78 (11-29-23 @ 20:36) (78 - 96)  BP: 98/63 (11-29-23 @ 20:31) (98/58 - 98/71)  RR: 18 (11-29-23 @ 20:31) (18 - 18)  SpO2: 96% (11-29-23 @ 20:36) (94% - 97%)    CONSTITUTIONAL: Well groomed, no apparent distress  EYES: PERRLA and symmetric, EOMI, No conjunctival or scleral injection, non-icteric  RESP: No respiratory distress, no use of accessory muscles; CTA b/l, no WRR  CV: RRR, +S1S2, no MRG; no JVD; no peripheral edema  GI: Soft, NT, ND, no rebound, no guarding; no palpable masses; no hepatosplenomegaly; no hernia palpated    Plan:   Chest pain   - EKG ordered   - Troponin Ordered   - Patient will receive Tylenol and maalox
Plan for bronchoscopy and BAL on Tuesday 11/28/23.   Preop labs on Monday

## 2023-11-29 NOTE — PROGRESS NOTE ADULT - ASSESSMENT
33y Female with no significant PMH presented in ED with c/o worsening left chest pain since yesterday.  Her chest pain is mainly located in left upper chest.  She also has on and off fever, cough and chills for last couple weeks. Her appetite is also low, and has lost about 4-5 kg of body weight in last 5-6 months.   She denies any night sweats or diaphoresis. As per her , she has some lung problems going on for last few years while she was in [place]. [place] recently come to [place] about 6 [place] ago.  She denies any personal or family history of tuberculosis.     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 Diagnostic considerations include cystic fibrosis, Lucho-Coleman syndrome, allergic bronchopulmonary aspergillosis (ABPA).  Concern for possible MTB with clinical presentation and radiological findings - airborne precautions and check  Sinus tachycardia likely due to pneumonia  Hypochromic microcytic  anemia    PLAN:    Pulmonary slow improvements  S/P Bronch wait cultures  Follow cultures/ Sputum AFB-neg/IGE 9999-VEGF normal 70  Serum serology-aspergillus, alph1, antitrypsin, & VEGF-D testing  Ct surgery fu  Iv cefepime  IV solu-medrols 40mg qdaily-change to po prednisone  Albuterol prn  Robitussin prn  TSH level  Spoke to elaina &  in Syriac/ spoke to Dr Mak again today in detail-friend Doctor  DVT prophylaxis: Lovenox sq daily

## 2023-11-29 NOTE — PROGRESS NOTE ADULT - SUBJECTIVE AND OBJECTIVE BOX
Subjective: Pt in bed NAD no issues overnight     T(C): 36.3 (11-29-23 @ 08:00), Max: 37.2 (11-28-23 @ 20:55)  HR: 83 (11-29-23 @ 08:00) (79 - 118)  BP: 98/58 (11-29-23 @ 08:00) (93/57 - 153/39)  ABP: --  ABP(mean): --  RR: 18 (11-29-23 @ 08:00) (12 - 23)  SpO2: 97% (11-29-23 @ 08:00) (92% - 98%) RA   Wt(kg): --  CVP(mm Hg): --  CO: --  CI: --  PA: --                                              Tele: SR                                      CAPILLARY BLOOD GLUCOSE                     Exam     Neuro:  Alert Awake NAD   Pulm: decreased at bases   CV: RRR  Abd: soft   Extremities: warm        Assessment:  33yFemale    with PAST MEDICAL & SURGICAL HISTORY:  History of lung disease      Pneumonia      No pertinent past surgical history

## 2023-11-29 NOTE — PROGRESS NOTE ADULT - SUBJECTIVE AND OBJECTIVE BOX
Subjective:    pat c/o body aches, s/p bronch afbx3 neg, cultures neg, CXR improving infiltrates.    Home Medications:  Albuterol (Eqv-ProAir HFA) 90 mcg/inh inhalation aerosol: 2 puff(s) inhaled every 6 hours (17 Nov 2023 20:15)  albuterol 2.5 mg/3 mL (0.083%) inhalation solution: 3 milliliter(s) by nebulizer 4 times a day (17 Nov 2023 20:15)  folic acid 1 mg oral tablet: 1 tab(s) orally once a day (17 Nov 2023 20:15)  naproxen 500 mg oral tablet: 1 tab(s) orally once a day as needed for (17 Nov 2023 20:15)    MEDICATIONS  (STANDING):  acetaminophen   IVPB .. 675 milliGRAM(s) IV Intermittent once  albuterol    90 MICROgram(s) HFA Inhaler 2 Puff(s) Inhalation every 6 hours  budesonide 160 MICROgram(s)/formoterol 4.5 MICROgram(s) Inhaler 2 Puff(s) Inhalation two times a day  enoxaparin Injectable 40 milliGRAM(s) SubCutaneous every 24 hours  folic acid 1 milliGRAM(s) Oral daily  methylPREDNISolone sodium succinate Injectable 40 milliGRAM(s) IV Push daily    MEDICATIONS  (PRN):  acetaminophen     Tablet .. 650 milliGRAM(s) Oral every 6 hours PRN Temp greater or equal to 38C (100.4F), Mild Pain (1 - 3)  albuterol    90 MICROgram(s) HFA Inhaler 2 Puff(s) Inhalation every 6 hours PRN Shortness of Breath and/or Wheezing  aluminum hydroxide/magnesium hydroxide/simethicone Suspension 30 milliLiter(s) Oral every 4 hours PRN Dyspepsia  guaiFENesin  milliGRAM(s) Oral every 12 hours PRN Cough  melatonin 3 milliGRAM(s) Oral at bedtime PRN Insomnia  ondansetron Injectable 4 milliGRAM(s) IV Push every 6 hours PRN Nausea and/or Vomiting      Allergies    No Known Allergies    Intolerances        Vital Signs Last 24 Hrs  T(C): 36.7 (29 Nov 2023 15:52), Max: 37.2 (28 Nov 2023 20:55)  T(F): 98.1 (29 Nov 2023 15:52), Max: 98.9 (28 Nov 2023 20:55)  HR: 96 (29 Nov 2023 15:52) (79 - 96)  BP: 98/71 (29 Nov 2023 15:52) (96/57 - 153/39)  BP(mean): --  RR: 18 (29 Nov 2023 15:52) (18 - 19)  SpO2: 95% (29 Nov 2023 15:52) (94% - 97%)    Parameters below as of 29 Nov 2023 15:52  Patient On (Oxygen Delivery Method): room air          PHYSICAL EXAMINATION:    NECK:  Supple. No lymphadenopathy. Jugular venous pressure not elevated. Carotids equal.   HEART:   The cardiac impulse has a normal quality. Reg., Nl S1 and S2.  There are no murmurs, rubs or gallops noted  CHEST:  Chest crackles to auscultation. Normal respiratory effort.  ABDOMEN:  Soft and nontender.   EXTREMITIES:  There is no edema.       LABS:

## 2023-11-30 ENCOUNTER — TRANSCRIPTION ENCOUNTER (OUTPATIENT)
Age: 33
End: 2023-11-30

## 2023-11-30 VITALS — OXYGEN SATURATION: 96 %

## 2023-11-30 LAB
-  AMPICILLIN/SULBACTAM: SIGNIFICANT CHANGE UP
-  AMPICILLIN/SULBACTAM: SIGNIFICANT CHANGE UP
-  CEFAZOLIN: SIGNIFICANT CHANGE UP
-  CEFAZOLIN: SIGNIFICANT CHANGE UP
-  CLINDAMYCIN: SIGNIFICANT CHANGE UP
-  CLINDAMYCIN: SIGNIFICANT CHANGE UP
-  DAPTOMYCIN: SIGNIFICANT CHANGE UP
-  DAPTOMYCIN: SIGNIFICANT CHANGE UP
-  ERYTHROMYCIN: SIGNIFICANT CHANGE UP
-  ERYTHROMYCIN: SIGNIFICANT CHANGE UP
-  GENTAMICIN: SIGNIFICANT CHANGE UP
-  GENTAMICIN: SIGNIFICANT CHANGE UP
-  LINEZOLID: SIGNIFICANT CHANGE UP
-  LINEZOLID: SIGNIFICANT CHANGE UP
-  OXACILLIN: SIGNIFICANT CHANGE UP
-  OXACILLIN: SIGNIFICANT CHANGE UP
-  PENICILLIN: SIGNIFICANT CHANGE UP
-  PENICILLIN: SIGNIFICANT CHANGE UP
-  RIFAMPIN: SIGNIFICANT CHANGE UP
-  RIFAMPIN: SIGNIFICANT CHANGE UP
-  TETRACYCLINE: SIGNIFICANT CHANGE UP
-  TETRACYCLINE: SIGNIFICANT CHANGE UP
-  TRIMETHOPRIM/SULFAMETHOXAZOLE: SIGNIFICANT CHANGE UP
-  TRIMETHOPRIM/SULFAMETHOXAZOLE: SIGNIFICANT CHANGE UP
-  VANCOMYCIN: SIGNIFICANT CHANGE UP
-  VANCOMYCIN: SIGNIFICANT CHANGE UP
A FLAVUS AB FLD QL: NEGATIVE — SIGNIFICANT CHANGE UP
A FLAVUS AB FLD QL: NEGATIVE — SIGNIFICANT CHANGE UP
A NIGER AB FLD QL: HIGH
CULTURE RESULTS: SIGNIFICANT CHANGE UP
GRAM STN FLD: ABNORMAL
GRAM STN FLD: ABNORMAL
HCT VFR BLD CALC: 34.2 % — LOW (ref 34.5–45)
HCT VFR BLD CALC: 34.2 % — LOW (ref 34.5–45)
HGB BLD-MCNC: 10.8 G/DL — LOW (ref 11.5–15.5)
HGB BLD-MCNC: 10.8 G/DL — LOW (ref 11.5–15.5)
MCHC RBC-ENTMCNC: 25.1 PG — LOW (ref 27–34)
MCHC RBC-ENTMCNC: 25.1 PG — LOW (ref 27–34)
MCHC RBC-ENTMCNC: 31.6 GM/DL — LOW (ref 32–36)
MCHC RBC-ENTMCNC: 31.6 GM/DL — LOW (ref 32–36)
MCV RBC AUTO: 79.4 FL — LOW (ref 80–100)
MCV RBC AUTO: 79.4 FL — LOW (ref 80–100)
METHOD TYPE: SIGNIFICANT CHANGE UP
METHOD TYPE: SIGNIFICANT CHANGE UP
PLATELET # BLD AUTO: 517 K/UL — HIGH (ref 150–400)
PLATELET # BLD AUTO: 517 K/UL — HIGH (ref 150–400)
RBC # BLD: 4.31 M/UL — SIGNIFICANT CHANGE UP (ref 3.8–5.2)
RBC # BLD: 4.31 M/UL — SIGNIFICANT CHANGE UP (ref 3.8–5.2)
RBC # FLD: 18.9 % — HIGH (ref 10.3–14.5)
RBC # FLD: 18.9 % — HIGH (ref 10.3–14.5)
SPECIMEN SOURCE: SIGNIFICANT CHANGE UP
WBC # BLD: 9.84 K/UL — SIGNIFICANT CHANGE UP (ref 3.8–10.5)
WBC # BLD: 9.84 K/UL — SIGNIFICANT CHANGE UP (ref 3.8–10.5)
WBC # FLD AUTO: 9.84 K/UL — SIGNIFICANT CHANGE UP (ref 3.8–10.5)
WBC # FLD AUTO: 9.84 K/UL — SIGNIFICANT CHANGE UP (ref 3.8–10.5)

## 2023-11-30 PROCEDURE — 99239 HOSP IP/OBS DSCHRG MGMT >30: CPT

## 2023-11-30 RX ORDER — PANTOPRAZOLE SODIUM 20 MG/1
40 TABLET, DELAYED RELEASE ORAL
Refills: 0 | Status: DISCONTINUED | OUTPATIENT
Start: 2023-11-30 | End: 2023-11-30

## 2023-11-30 RX ORDER — BUDESONIDE AND FORMOTEROL FUMARATE DIHYDRATE 160; 4.5 UG/1; UG/1
1 AEROSOL RESPIRATORY (INHALATION)
Qty: 1 | Refills: 0
Start: 2023-11-30

## 2023-11-30 RX ORDER — PANTOPRAZOLE SODIUM 20 MG/1
1 TABLET, DELAYED RELEASE ORAL
Qty: 30 | Refills: 0
Start: 2023-11-30 | End: 2023-12-29

## 2023-11-30 RX ADMIN — ALBUTEROL 2 PUFF(S): 90 AEROSOL, METERED ORAL at 13:55

## 2023-11-30 RX ADMIN — BUDESONIDE AND FORMOTEROL FUMARATE DIHYDRATE 2 PUFF(S): 160; 4.5 AEROSOL RESPIRATORY (INHALATION) at 08:27

## 2023-11-30 RX ADMIN — Medication 40 MILLIGRAM(S): at 09:32

## 2023-11-30 RX ADMIN — PANTOPRAZOLE SODIUM 40 MILLIGRAM(S): 20 TABLET, DELAYED RELEASE ORAL at 10:19

## 2023-11-30 RX ADMIN — ALBUTEROL 2 PUFF(S): 90 AEROSOL, METERED ORAL at 08:29

## 2023-11-30 RX ADMIN — Medication 1 MILLIGRAM(S): at 09:32

## 2023-11-30 RX ADMIN — ALBUTEROL 2 PUFF(S): 90 AEROSOL, METERED ORAL at 01:41

## 2023-11-30 NOTE — DISCHARGE NOTE PROVIDER - PROVIDER TOKENS
PROVIDER:[TOKEN:[[id number]]] PROVIDER:[TOKEN:[[id number]]],FREE:[LAST:[Sarita],FIRST:[Philippe],PHONE:[phone]:[(   )    -],ADDRESS:[[address] [place] [address] [place]  [facility]] PROVIDER:[TOKEN:[[id number]]],FREE:[LAST:[aSrita],FIRST:[Philippe],PHONE:[phone]],FAX:[(   )    -],ADDRESS:[[address] [place] [address] [place]  [facility]] PROVIDER:[TOKEN:[[id number]]],FREE:[LAST:[Sarita],FIRST:[Philippe],PHONE:[phone]],FAX:[(   )    -],ADDRESS:[[address] [place] [address] [place]  [facility]]

## 2023-11-30 NOTE — PROGRESS NOTE ADULT - ASSESSMENT
33y Female with no significant PMH presented in ED with c/o worsening left chest pain since yesterday.  Her chest pain is mainly located in left upper chest.  She also has on and off fever, cough and chills for last couple weeks. Her appetite is also low, and has lost about 4-5 kg of body weight in last 5-6 months.   She denies any night sweats or diaphoresis. As per her , she has some lung problems going on for last few years while she was in [place]. [place] recently come to [place] ago.  She denies any personal or family history of tuberculosis.     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 Diagnostic considerations include cystic fibrosis, Lucho-Coleman syndrome, allergic bronchopulmonary aspergillosis (ABPA).  Concern for possible MTB with clinical presentation and radiological findings - airborne precautions and check  Sinus tachycardia likely due to pneumonia  Hypochromic microcytic  anemia    PLAN:    Pulmonary slow improvements  S/P Bronch wait cultures-mold like fungus  Follow cultures/ Sputum AFB-neg/IGE 9999-VEGF normal 70  Serum serology-aspergillus, alph1, antitrypsin, & VEGF-D testing  Ct surgery fu  Iv cefepime  Po prednisone 40mg adily  Albuterol prn  Robitussin prn  TSH level  Spoke to elaina &  in Czech/ spoke to Dr Mak again today in detail-friend Doctor  DVT prophylaxis: Lovenox sq daily

## 2023-11-30 NOTE — DISCHARGE NOTE PROVIDER - HOSPITAL COURSE
The patient is a 33y Female with no significant PMH presented in ED with c/o worsening left chest pain since yesterday.  Her chest pain is mainly located in left upper chest.  She also has on and off fever, cough and chills for last couple weeks. Her appetite is also low, and has lost about 4-5 kg of body weight in last 5-6 months.   She denies any night sweats or diaphoresis. As per her , she has some lung problems going on for last few years while she was in [place]. [place] recently come to [place] ago.  She denies any personal or family history of tuberculosis.   Upon arrival in ED, she was tachycardic with HR in 140s but afebrile. She is saturating well on RA.   Sig labs: WBC 22.12k, N 86%, Lactate 1.8,  D-dimer 480,  Hb11.6, MCV 74, RDW 16, . Lipase 14 and Mg 2.1.  CXR CHARLY consolidation.  CT angio chest: Negative for PE, but shows CHARLY pneumonia and B/L extensive bronchiectasis.  EK bpm in sinus tachycardia.    S:  : Lying in bed, comfortable.  Spoke to family via phone in english, discussed plan of care, states feeling better overall.  Discussed plan for bronch Tuesday.  : COmfortable, no new events, status quo, due for bronch tomorrow.  Spoke to family via phone in english to update on plan of care.  : Feels okay, Denies fever, chills, N, V, abd pain, CP, SOB.  Awaiting bronch for today.  23: S/P bronchoscopy yesterday. Feels weak and tired and occasional dizziness.       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    - completed course of doxycycline   - Cultures neg  - Serum serology-aspergillus, alph1, antitrypsin, & VEGF-D testing noted  - S/P bronchoscopy-follow up with Dr Patel for biopsy report    Home today  Spent more than 30 min to prepare the discharge                       The patient is a 33y Female with no significant PMH presented in ED with c/o worsening left chest pain since yesterday.  Her chest pain is mainly located in left upper chest.  She also has on and off fever, cough and chills for last couple weeks. Her appetite is also low, and has lost about 4-5 kg of body weight in last 5-6 months.   She denies any night sweats or diaphoresis. As per her , she has some lung problems going on for last few years while she was in [place]. [place] recently come to [place] ago.  She denies any personal or family history of tuberculosis.   Upon arrival in ED, she was tachycardic with HR in 140s but afebrile. She is saturating well on RA.   Sig labs: WBC 22.12k, N 86%, Lactate 1.8,  D-dimer 480,  Hb11.6, MCV 74, RDW 16, . Lipase 14 and Mg 2.1.  CXR CHARLY consolidation.  CT angio chest: Negative for PE, but shows CHARLY pneumonia and B/L extensive bronchiectasis.  EK bpm in sinus tachycardia.    S:  : Lying in bed, comfortable.  Spoke to family via phone in english, discussed plan of care, states feeling better overall.  Discussed plan for bronch Tuesday.  : COmfortable, no new events, status quo, due for bronch tomorrow.  Spoke to family via phone in english to update on plan of care.  : Feels okay, Denies fever, chills, N, V, abd pain, CP, SOB.  Awaiting bronch for today.  23: S/P bronchoscopy yesterday. Feels weak and tired and occasional dizziness.       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    - completed course of doxycycline   - Cultures neg  - Serum serology-aspergillus, alph1, antitrypsin, & VEGF-D testing noted  - S/P bronchoscopy-follow up with Dr Ptael for biopsy report    Home today  Spent more than 30 min to prepare the discharge

## 2023-11-30 NOTE — DISCHARGE NOTE NURSING/CASE MANAGEMENT/SOCIAL WORK - PATIENT PORTAL LINK FT
You can access the FollowMyHealth Patient Portal offered by [facility] by registering at the following website: [email or web address]. By joining [facility]’s FollowMyHealth portal, you will also be able to view your health information using other applications (apps) compatible with our system.

## 2023-11-30 NOTE — DISCHARGE NOTE PROVIDER - NSDCFUSCHEDAPPT_GEN_ALL_CORE_FT
Deborah Gambino  [facility]  [facility] [address] [facility]  Scheduled Appointment: 01/25/2024     Deborah Gambino  [facility]  [facility] [address] [facility]  Scheduled Appointment: 01/25/2024     Deborah Gambino  [facility]  [facility] [address] [facility]  Scheduled Appointment: 01/25/2024

## 2023-11-30 NOTE — PROGRESS NOTE ADULT - PROVIDER SPECIALTY LIST ADULT
Critical Care
Hospitalist
Infectious Disease
Pulmonology
Thoracic Surgery
Hospitalist
Infectious Disease
Pulmonology
Thoracic Surgery
Hospitalist
Infectious Disease
Infectious Disease
Pulmonology
Pulmonology
Thoracic Surgery
Thoracic Surgery
Hospitalist
Pulmonology

## 2023-11-30 NOTE — DISCHARGE NOTE PROVIDER - NPI NUMBER (FOR SYSADMIN USE ONLY) :
[[id number]] [[id number]] [[id number]] [[id number]],[UNKNOWN] [[id number]],[UNKNOWN] [[id number]],[UNKNOWN]

## 2023-11-30 NOTE — DISCHARGE NOTE PROVIDER - NSDCCPCAREPLAN_GEN_ALL_CORE_FT
PRINCIPAL DISCHARGE DIAGNOSIS  Diagnosis: Left upper lobe pneumonia  Assessment and Plan of Treatment: Follow up with Dr Patel

## 2023-11-30 NOTE — PROGRESS NOTE ADULT - SUBJECTIVE AND OBJECTIVE BOX
Subjective:    pat better, sitting in bed, no new complaints.    Home Medications:  Albuterol (Eqv-ProAir HFA) 90 mcg/inh inhalation aerosol: 2 puff(s) inhaled every 6 hours (17 Nov 2023 20:15)  albuterol 2.5 mg/3 mL (0.083%) inhalation solution: 3 milliliter(s) by nebulizer 4 times a day (17 Nov 2023 20:15)  folic acid 1 mg oral tablet: 1 tab(s) orally once a day (17 Nov 2023 20:15)    MEDICATIONS  (STANDING):  acetaminophen   IVPB .. 675 milliGRAM(s) IV Intermittent once  albuterol    90 MICROgram(s) HFA Inhaler 2 Puff(s) Inhalation every 6 hours  budesonide 160 MICROgram(s)/formoterol 4.5 MICROgram(s) Inhaler 2 Puff(s) Inhalation two times a day  enoxaparin Injectable 40 milliGRAM(s) SubCutaneous every 24 hours  folic acid 1 milliGRAM(s) Oral daily  pantoprazole    Tablet 40 milliGRAM(s) Oral before breakfast  predniSONE   Tablet 40 milliGRAM(s) Oral daily    MEDICATIONS  (PRN):  acetaminophen     Tablet .. 650 milliGRAM(s) Oral every 6 hours PRN Temp greater or equal to 38C (100.4F), Mild Pain (1 - 3)  albuterol    90 MICROgram(s) HFA Inhaler 2 Puff(s) Inhalation every 6 hours PRN Shortness of Breath and/or Wheezing  aluminum hydroxide/magnesium hydroxide/simethicone Suspension 30 milliLiter(s) Oral every 4 hours PRN Dyspepsia  guaiFENesin  milliGRAM(s) Oral every 12 hours PRN Cough  melatonin 3 milliGRAM(s) Oral at bedtime PRN Insomnia  ondansetron Injectable 4 milliGRAM(s) IV Push every 6 hours PRN Nausea and/or Vomiting      Allergies    No Known Allergies    Intolerances        Vital Signs Last 24 Hrs  T(C): 36.7 (30 Nov 2023 07:32), Max: 36.7 (29 Nov 2023 20:31)  T(F): 98.1 (30 Nov 2023 07:32), Max: 98.1 (29 Nov 2023 20:31)  HR: 66 (30 Nov 2023 14:07) (65 - 87)  BP: 98/54 (30 Nov 2023 07:32) (98/54 - 98/63)  BP(mean): --  RR: 18 (30 Nov 2023 07:32) (18 - 18)  SpO2: 96% (30 Nov 2023 14:07) (95% - 97%)    Parameters below as of 30 Nov 2023 14:07  Patient On (Oxygen Delivery Method): room air          PHYSICAL EXAMINATION:    NECK:  Supple. No lymphadenopathy. Jugular venous pressure not elevated. Carotids equal.   HEART:   The cardiac impulse has a normal quality. Reg., Nl S1 and S2.  There are no murmurs, rubs or gallops noted  CHEST:  Chest rhonchi to auscultation. Normal respiratory effort.  ABDOMEN:  Soft and nontender.   EXTREMITIES:  There is no edema.       LABS:                        10.8   9.84  )-----------( 517      ( 30 Nov 2023 08:24 )             34.2

## 2023-11-30 NOTE — DISCHARGE NOTE PROVIDER - NSDCFUADDAPPT_GEN_ALL_CORE_FT
Call Dr. Stein's office at [phone] tomorrow or the next business day to make a followup appointment.   Call Dr. Stein's office at [phone] tomorrow or the next business day to make a followup appointment.   Call Dr. Stein's office at [phone] tomorrow or the next business day to make a followup appointment.

## 2023-11-30 NOTE — DISCHARGE NOTE PROVIDER - NSDCMRMEDTOKEN_GEN_ALL_CORE_FT
Albuterol (Eqv-ProAir HFA) 90 mcg/inh inhalation aerosol: 2 puff(s) inhaled every 6 hours  albuterol 2.5 mg/3 mL (0.083%) inhalation solution: 3 milliliter(s) by nebulizer 4 times a day  budesonide-formoterol 160 mcg-4.5 mcg/inh inhalation aerosol: 1 dose(s) inhaled 2 times a day  folic acid 1 mg oral tablet: 1 tab(s) orally once a day  pantoprazole 40 mg oral delayed release tablet: 1 tab(s) orally once a day (before a meal)  predniSONE 20 mg oral tablet: 1 tab(s) orally 2 times a day 1 tab twice daily for 3 days, then 1 tab daily for 3 days and then half tab daily cummins 4 days

## 2023-11-30 NOTE — DISCHARGE NOTE PROVIDER - CARE PROVIDER_API CALL
Umair Patel  Pulmonary Disease  [address] [place] [address]  Phone: [phone]  Fax: [phone]  Follow Up Time:    Umair Patel  Pulmonary Disease  [address] [place] [address]  Phone: [phone]  Fax: [phone]  Follow Up Time:    Umair Patel  Pulmonary Disease  [address] [place] [address]  Phone: [phone]  Fax: [phone]  Follow Up Time:    Umair Patel  Pulmonary Disease  [address] [place] [address]  Phone: [phone]  Fax: [phone]  Follow Up Time:     Philippe Stein  Phone: [phone]  Fax: (   )    -  Follow Up Time:    Umair Patel  Pulmonary Disease  [address] [place] [address]  Phone: [phone]  Fax: [phone]  Follow Up Time:     Philippe Stein  Phone: [phone]  Fax: (   )    -  Follow Up Time:    Umair Patel  Pulmonary Disease  [address] [place] [address]  Phone: [phone]  Fax: [phone]  Follow Up Time:     Philippe Stein  Phone: [phone]  Fax: (   )    -  Follow Up Time:

## 2023-11-30 NOTE — PROGRESS NOTE ADULT - REASON FOR ADMISSION
CHARLY pneumonia and B/L bronchiectasis

## 2023-12-01 RX ORDER — BUDESONIDE AND FORMOTEROL FUMARATE DIHYDRATE 160; 4.5 UG/1; UG/1
1 AEROSOL RESPIRATORY (INHALATION)
Qty: 1 | Refills: 0
Start: 2023-12-01 | End: 2023-12-30

## 2023-12-01 RX ORDER — PANTOPRAZOLE SODIUM 20 MG/1
1 TABLET, DELAYED RELEASE ORAL
Qty: 30 | Refills: 0
Start: 2023-12-01 | End: 2023-12-30

## 2023-12-02 LAB
A FLAVUS AB FLD QL: NEGATIVE — SIGNIFICANT CHANGE UP
A FLAVUS AB FLD QL: NEGATIVE — SIGNIFICANT CHANGE UP
A NIGER AB FLD QL: NEGATIVE — SIGNIFICANT CHANGE UP
CULTURE RESULTS: ABNORMAL
CULTURE RESULTS: ABNORMAL
SPECIMEN SOURCE: SIGNIFICANT CHANGE UP
SPECIMEN SOURCE: SIGNIFICANT CHANGE UP

## 2023-12-03 LAB
CULTURE RESULTS: ABNORMAL
CULTURE RESULTS: ABNORMAL
ORGANISM # SPEC MICROSCOPIC CNT: ABNORMAL
ORGANISM # SPEC MICROSCOPIC CNT: ABNORMAL
ORGANISM # SPEC MICROSCOPIC CNT: SIGNIFICANT CHANGE UP
ORGANISM # SPEC MICROSCOPIC CNT: SIGNIFICANT CHANGE UP
SPECIMEN SOURCE: SIGNIFICANT CHANGE UP
SPECIMEN SOURCE: SIGNIFICANT CHANGE UP

## 2023-12-04 ENCOUNTER — NON-APPOINTMENT (OUTPATIENT)
Age: 33
End: 2023-12-04

## 2023-12-04 LAB
CYSTIC FIBROSIS EXPANDED PANEL: SIGNIFICANT CHANGE UP
CYSTIC FIBROSIS EXPANDED PANEL: SIGNIFICANT CHANGE UP
GALACTOMANNAN AG SERPL-ACNC: 0.07 INDEX — SIGNIFICANT CHANGE UP (ref 0–0.49)
GALACTOMANNAN AG SERPL-ACNC: 0.07 INDEX — SIGNIFICANT CHANGE UP (ref 0–0.49)

## 2023-12-06 LAB
SURGICAL PATHOLOGY STUDY: SIGNIFICANT CHANGE UP
SURGICAL PATHOLOGY STUDY: SIGNIFICANT CHANGE UP

## 2023-12-07 DIAGNOSIS — D50.9 IRON DEFICIENCY ANEMIA, UNSPECIFIED: ICD-10-CM

## 2023-12-07 DIAGNOSIS — J84.81 LYMPHANGIOLEIOMYOMATOSIS: ICD-10-CM

## 2023-12-07 DIAGNOSIS — J98.4 OTHER DISORDERS OF LUNG: ICD-10-CM

## 2023-12-07 DIAGNOSIS — R07.9 CHEST PAIN, UNSPECIFIED: ICD-10-CM

## 2023-12-07 DIAGNOSIS — A41.9 SEPSIS, UNSPECIFIED ORGANISM: ICD-10-CM

## 2023-12-07 DIAGNOSIS — E87.8 OTHER DISORDERS OF ELECTROLYTE AND FLUID BALANCE, NOT ELSEWHERE CLASSIFIED: ICD-10-CM

## 2023-12-07 DIAGNOSIS — J15.69 PNEUMONIA DUE TO OTHER GRAM-NEGATIVE BACTERIA: ICD-10-CM

## 2023-12-07 DIAGNOSIS — J47.9 BRONCHIECTASIS, UNCOMPLICATED: ICD-10-CM

## 2023-12-11 ENCOUNTER — APPOINTMENT (OUTPATIENT)
Dept: PULMONOLOGY | Facility: CLINIC | Age: 33
End: 2023-12-11
Payer: MEDICAID

## 2023-12-11 VITALS
OXYGEN SATURATION: 97 % | BODY MASS INDEX: 19.83 KG/M2 | SYSTOLIC BLOOD PRESSURE: 111 MMHG | WEIGHT: 101 LBS | DIASTOLIC BLOOD PRESSURE: 69 MMHG | HEIGHT: 60 IN | HEART RATE: 86 BPM | RESPIRATION RATE: 18 BRPM

## 2023-12-11 PROCEDURE — 99214 OFFICE O/P EST MOD 30 MIN: CPT

## 2023-12-19 ENCOUNTER — APPOINTMENT (OUTPATIENT)
Dept: INTERNAL MEDICINE | Facility: CLINIC | Age: 33
End: 2023-12-19

## 2023-12-20 LAB
CULTURE RESULTS: ABNORMAL
SPECIMEN SOURCE: SIGNIFICANT CHANGE UP

## 2023-12-27 LAB
CULTURE RESULTS: SIGNIFICANT CHANGE UP
SPECIMEN SOURCE: SIGNIFICANT CHANGE UP

## 2024-01-03 LAB
CULTURE RESULTS: SIGNIFICANT CHANGE UP
CULTURE RESULTS: SIGNIFICANT CHANGE UP
SPECIMEN SOURCE: SIGNIFICANT CHANGE UP
SPECIMEN SOURCE: SIGNIFICANT CHANGE UP

## 2024-01-06 LAB
CULTURE RESULTS: SIGNIFICANT CHANGE UP
SPECIMEN SOURCE: SIGNIFICANT CHANGE UP

## 2024-01-08 LAB — ACID FAST STN SPT: NORMAL

## 2024-01-13 LAB

## 2024-01-16 ENCOUNTER — APPOINTMENT (OUTPATIENT)
Dept: PULMONOLOGY | Facility: CLINIC | Age: 34
End: 2024-01-16

## 2024-01-22 ENCOUNTER — OUTPATIENT (OUTPATIENT)
Dept: OUTPATIENT SERVICES | Facility: HOSPITAL | Age: 34
LOS: 1 days | End: 2024-01-22
Payer: COMMERCIAL

## 2024-01-22 ENCOUNTER — APPOINTMENT (OUTPATIENT)
Dept: CT IMAGING | Facility: CLINIC | Age: 34
End: 2024-01-22
Payer: MEDICAID

## 2024-01-22 DIAGNOSIS — J47.9 BRONCHIECTASIS, UNCOMPLICATED: ICD-10-CM

## 2024-01-22 DIAGNOSIS — Z78.9 OTHER SPECIFIED HEALTH STATUS: Chronic | ICD-10-CM

## 2024-01-22 PROCEDURE — 71250 CT THORAX DX C-: CPT

## 2024-01-22 PROCEDURE — 71250 CT THORAX DX C-: CPT | Mod: 26

## 2024-01-25 ENCOUNTER — APPOINTMENT (OUTPATIENT)
Dept: INTERNAL MEDICINE | Facility: CLINIC | Age: 34
End: 2024-01-25
Payer: MEDICAID

## 2024-01-25 VITALS
SYSTOLIC BLOOD PRESSURE: 110 MMHG | HEART RATE: 66 BPM | DIASTOLIC BLOOD PRESSURE: 70 MMHG | TEMPERATURE: 98.3 F | HEIGHT: 60 IN | BODY MASS INDEX: 20.62 KG/M2 | WEIGHT: 105 LBS | RESPIRATION RATE: 14 BRPM | OXYGEN SATURATION: 98 %

## 2024-01-25 DIAGNOSIS — J84.9 INTERSTITIAL PULMONARY DISEASE, UNSPECIFIED: ICD-10-CM

## 2024-01-25 DIAGNOSIS — J47.9 BRONCHIECTASIS, UNCOMPLICATED: ICD-10-CM

## 2024-01-25 DIAGNOSIS — E53.8 DEFICIENCY OF OTHER SPECIFIED B GROUP VITAMINS: ICD-10-CM

## 2024-01-25 DIAGNOSIS — M54.16 RADICULOPATHY, LUMBAR REGION: ICD-10-CM

## 2024-01-25 DIAGNOSIS — K21.9 GASTRO-ESOPHAGEAL REFLUX DISEASE W/OUT ESOPHAGITIS: ICD-10-CM

## 2024-01-25 PROCEDURE — 99213 OFFICE O/P EST LOW 20 MIN: CPT

## 2024-01-25 RX ORDER — PREDNISONE 10 MG/1
10 TABLET ORAL
Qty: 18 | Refills: 0 | Status: DISCONTINUED | COMMUNITY
Start: 2023-10-26 | End: 2024-01-25

## 2024-01-25 RX ORDER — BUDESONIDE 0.5 MG/2ML
0.5 INHALANT ORAL
Qty: 60 | Refills: 5 | Status: ACTIVE | COMMUNITY
Start: 2023-12-11 | End: 1900-01-01

## 2024-01-25 RX ORDER — FOLIC ACID 1 MG/1
1 TABLET ORAL
Qty: 90 | Refills: 1 | Status: ACTIVE | COMMUNITY
Start: 2023-10-24 | End: 1900-01-01

## 2024-01-25 RX ORDER — PREDNISONE 10 MG/1
10 TABLET ORAL
Qty: 30 | Refills: 1 | Status: DISCONTINUED | COMMUNITY
Start: 2023-12-11 | End: 2024-01-25

## 2024-01-25 RX ORDER — PANTOPRAZOLE 20 MG/1
20 TABLET, DELAYED RELEASE ORAL DAILY
Qty: 30 | Refills: 2 | Status: ACTIVE | COMMUNITY
Start: 2024-01-25 | End: 1900-01-01

## 2024-03-15 ENCOUNTER — LABORATORY RESULT (OUTPATIENT)
Age: 34
End: 2024-03-15

## 2024-03-15 ENCOUNTER — APPOINTMENT (OUTPATIENT)
Dept: PULMONOLOGY | Facility: CLINIC | Age: 34
End: 2024-03-15
Payer: COMMERCIAL

## 2024-03-15 VITALS
SYSTOLIC BLOOD PRESSURE: 115 MMHG | HEIGHT: 60 IN | HEART RATE: 80 BPM | WEIGHT: 105 LBS | BODY MASS INDEX: 20.62 KG/M2 | OXYGEN SATURATION: 96 % | DIASTOLIC BLOOD PRESSURE: 74 MMHG

## 2024-03-15 DIAGNOSIS — J47.9 BRONCHIECTASIS, UNCOMPLICATED: ICD-10-CM

## 2024-03-15 PROCEDURE — 71046 X-RAY EXAM CHEST 2 VIEWS: CPT

## 2024-03-15 PROCEDURE — 36415 COLL VENOUS BLD VENIPUNCTURE: CPT

## 2024-03-15 PROCEDURE — 99214 OFFICE O/P EST MOD 30 MIN: CPT

## 2024-03-15 RX ORDER — ALBUTEROL SULFATE 2.5 MG/3ML
(2.5 MG/3ML) SOLUTION RESPIRATORY (INHALATION)
Qty: 180 | Refills: 2 | Status: ACTIVE | COMMUNITY
Start: 2023-10-30 | End: 1900-01-01

## 2024-03-15 RX ORDER — BUDESONIDE AND FORMOTEROL FUMARATE DIHYDRATE 160; 4.5 UG/1; UG/1
160-4.5 AEROSOL RESPIRATORY (INHALATION)
Qty: 3 | Refills: 3 | Status: ACTIVE | COMMUNITY
Start: 2023-12-11 | End: 1900-01-01

## 2024-03-15 RX ORDER — PREDNISONE 20 MG/1
20 TABLET ORAL DAILY
Qty: 60 | Refills: 1 | Status: ACTIVE | COMMUNITY
Start: 2024-03-15 | End: 1900-01-01

## 2024-03-15 RX ORDER — DOXYCYCLINE HYCLATE 100 MG/1
100 TABLET ORAL
Qty: 14 | Refills: 0 | Status: ACTIVE | COMMUNITY
Start: 2024-03-15 | End: 1900-01-01

## 2024-03-15 RX ORDER — MONTELUKAST 10 MG/1
10 TABLET, FILM COATED ORAL DAILY
Qty: 90 | Refills: 2 | Status: ACTIVE | COMMUNITY
Start: 2023-12-11 | End: 1900-01-01

## 2024-03-15 NOTE — PROCEDURE
[FreeTextEntry1] : CXR: no focal consolidation, increased interstital markings in keeping with her severe bronchiectasis.   Prior records reviewed:  [facility] records reviewed--severe CHARLY pneumonia  PFT: restricted.  Lung volumes low. DLCO moderately reduced.   EXAM: [id number] - CT CHEST  - ORDERED BY: MAGDY RAMIREZ   PROCEDURE DATE:  08/23/2023    INTERPRETATION:  CLINICAL INFORMATION: Abnormal chest x-ray  COMPARISON: Chest x-ray 8/15/2023  CONTRAST/COMPLICATIONS: IV Contrast: NONE Oral Contrast: NONE Complications: None reported at time of study completion  PROCEDURE: CT scan of the chest was obtained without intravenous contrast.  FINDINGS:  LYMPH NODES: No enlarged thoracic lymph nodes.  HEART/VASCULATURE: The heart is normal in size. Levocardia. No pericardial effusion. No coronary artery calcification.  AIRWAYS/LUNGS/PLEURA: The trachea is patent. There is central cystic bronchiectasis involving all lobes with upper lobe predominance, with architectural distortion and biapical scarring. Branching tubular opacities are likely secondary to airway impaction, with scattered areas of tree-in-bud nodularity. Some of the central impacted airways in the left upper lobe demonstrated high density secretions which can be seen in the setting of allergic bronchopulmonary aspergillosis.  UPPER ABDOMEN: Normal situs. Nonobstructing right upper pole and interpolar kidney stone, each measuring 2 mm. No hydronephrosis.  BONES/SOFT TISSUES: Unremarkable.  IMPRESSION: Central cystic bronchiectasis involving all lobes of an upper lobe predominance, with associated architectural distortion/scarring and airway impaction. Diagnostic considerations include cystic fibrosis, Lucho-Coleman syndrome, allergic bronchopulmonary aspergillosis (ABPA), among other etiologies.    --- End of Report ---      MARGARET GALVEZ MD; Resident Radiologist This document has been electronically signed. MOLLY TUTTLE M.D., Attending Radiologist This document has been electronically signed. Aug 30 2023 12:06PM

## 2024-03-15 NOTE — ASSESSMENT
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

## 2024-03-15 NOTE — HISTORY OF PRESENT ILLNESS
[Never] : never [TextBox_4] : accompanied again today by her "neighbor" neighbor states Concha has been sick at home for the last 10 days with cough/sputum/fever, did not seek medical attention bc no one around to bring her to the doctor, neighbor was in [place] at the time. she is no longer having fevers but feels very congested, needs refills of nebs/inhalers.

## 2024-03-15 NOTE — PHYSICAL EXAM
[No Acute Distress] : no acute distress [No Resp Distress] : no resp distress [TextBox_68] : decreased bs bilat

## 2024-03-17 ENCOUNTER — RX RENEWAL (OUTPATIENT)
Age: 34
End: 2024-03-17

## 2024-03-17 RX ORDER — ALBUTEROL SULFATE 90 UG/1
108 (90 BASE) INHALANT RESPIRATORY (INHALATION)
Qty: 6.7 | Refills: 0 | Status: ACTIVE | COMMUNITY
Start: 2023-10-26 | End: 1900-01-01

## 2024-03-18 LAB
A FUMIGATUS IGE QN: 33.1 KUA/L
BASOPHILS # BLD AUTO: 0.04 K/UL
BASOPHILS NFR BLD AUTO: 0.5 %
DEPRECATED A FUMIGATUS IGE RAST QL: 4 (ref 0–?)
EOSINOPHIL # BLD AUTO: 0.63 K/UL
EOSINOPHIL NFR BLD AUTO: 7.6 %
HCT VFR BLD CALC: 41 %
HGB BLD-MCNC: 13.2 G/DL
IMM GRANULOCYTES NFR BLD AUTO: 0.2 %
LYMPHOCYTES # BLD AUTO: 3.47 K/UL
LYMPHOCYTES NFR BLD AUTO: 41.6 %
MAN DIFF?: NORMAL
MCHC RBC-ENTMCNC: 26.8 PG
MCHC RBC-ENTMCNC: 32.2 GM/DL
MCV RBC AUTO: 83.3 FL
MONOCYTES # BLD AUTO: 0.45 K/UL
MONOCYTES NFR BLD AUTO: 5.4 %
NEUTROPHILS # BLD AUTO: 3.73 K/UL
NEUTROPHILS NFR BLD AUTO: 44.7 %
PLATELET # BLD AUTO: 376 K/UL
RBC # BLD: 4.92 M/UL
RBC # FLD: 13.9 %
TOTAL IGE SMQN RAST: 1891 KU/L
WBC # FLD AUTO: 8.34 K/UL

## 2024-03-19 LAB — STRONGYLOIDES AB SER IA-ACNC: NEGATIVE

## 2024-03-20 LAB
A FLAVUS AB FLD QL: NEGATIVE
A FUMIGATUS AB FLD QL: NEGATIVE
A NIGER AB FLD QL: NEGATIVE

## 2024-04-01 ENCOUNTER — APPOINTMENT (OUTPATIENT)
Dept: PULMONOLOGY | Facility: CLINIC | Age: 34
End: 2024-04-01

## 2024-04-01 ENCOUNTER — APPOINTMENT (OUTPATIENT)
Dept: INTERNAL MEDICINE | Facility: CLINIC | Age: 34
End: 2024-04-01

## 2024-04-16 ENCOUNTER — APPOINTMENT (OUTPATIENT)
Dept: PAIN MANAGEMENT | Facility: CLINIC | Age: 34
End: 2024-04-16

## 2024-04-16 NOTE — HISTORY OF PRESENT ILLNESS
[FreeTextEntry1] : 04/16/2024 : Patient presents for initial evaluation. He/She c/o      Subjective Weakness: Yes/No   Numbness/Tingling: Yes/No   Bladder/Bowel dysfunction: Yes/No   Treatments Tried:     Attempted modalities for current pain complaint:  See above:  Medications: Yes/No     Injections: Yes/No     Previous Spine Surgery: N/A     Imaging:  MRI Lumbar Spine (9/16/23): [facility] Lower lumbar spondylosis at L4-L5 and L5-S1 with disc degeneration and posterior disc protrusions resulting in lateral recess and foraminal narrowing as detailed above and most advanced on the right at L5-S1 where there is severe right lateral recess stenosis with likely impingement of the descending right S1 nerve root.

## 2024-08-12 ENCOUNTER — APPOINTMENT (OUTPATIENT)
Dept: INTERNAL MEDICINE | Facility: CLINIC | Age: 34
End: 2024-08-12
Payer: COMMERCIAL

## 2024-08-12 VITALS
HEIGHT: 60 IN | DIASTOLIC BLOOD PRESSURE: 60 MMHG | WEIGHT: 106 LBS | HEART RATE: 88 BPM | OXYGEN SATURATION: 96 % | RESPIRATION RATE: 16 BRPM | BODY MASS INDEX: 20.81 KG/M2 | TEMPERATURE: 98 F | SYSTOLIC BLOOD PRESSURE: 94 MMHG

## 2024-08-12 DIAGNOSIS — J47.9 BRONCHIECTASIS, UNCOMPLICATED: ICD-10-CM

## 2024-08-12 DIAGNOSIS — R21 RASH AND OTHER NONSPECIFIC SKIN ERUPTION: ICD-10-CM

## 2024-08-12 PROCEDURE — 99214 OFFICE O/P EST MOD 30 MIN: CPT

## 2024-08-12 RX ORDER — PREDNISONE 20 MG/1
20 TABLET ORAL
Qty: 20 | Refills: 0 | Status: ACTIVE | COMMUNITY
Start: 2024-08-12 | End: 1900-01-01

## 2024-08-12 NOTE — HISTORY OF PRESENT ILLNESS
[de-identified] : 34 yr old female with bronchiectasis, interstitial lung disease here for follow up. Accompanied by her neighbor, who states that patient has been having some wheezing and shortness of breath for 1 week. Patient denies fever, chills. She had a D&C last week, had vaginal bleeding for a day and has now subsided. She states she feels weak and has been having some shortness of breath with a cough, with white phlegm. No blood in sputum. Unclear compliance with inhalers Has not seen pulmonary since March 2024. She had a rash on her back for few days. No new medications.

## 2024-08-12 NOTE — PHYSICAL EXAM
[No Acute Distress] : no acute distress [Well Nourished] : well nourished [Well Developed] : well developed [Well-Appearing] : well-appearing [PERRL] : pupils equal round and reactive to light [Normal Outer Ear/Nose] : the outer ears and nose were normal in appearance [Normal Oropharynx] : the oropharynx was normal [No JVD] : no jugular venous distention [No Lymphadenopathy] : no lymphadenopathy [Supple] : supple [No Respiratory Distress] : no respiratory distress  [No Accessory Muscle Use] : no accessory muscle use [Normal Rate] : normal rate  [Regular Rhythm] : with a regular rhythm [Normal S1, S2] : normal S1 and S2 [Pedal Pulses Present] : the pedal pulses are present [No Edema] : there was no peripheral edema [Soft] : abdomen soft [Non Tender] : non-tender [Non-distended] : non-distended [Normal Bowel Sounds] : normal bowel sounds [Normal Posterior Cervical Nodes] : no posterior cervical lymphadenopathy [Normal Anterior Cervical Nodes] : no anterior cervical lymphadenopathy [No CVA Tenderness] : no CVA  tenderness [No Spinal Tenderness] : no spinal tenderness [No Joint Swelling] : no joint swelling [Grossly Normal Strength/Tone] : grossly normal strength/tone [No Rash] : no rash [No Focal Deficits] : no focal deficits [Normal Gait] : normal gait [Normal Affect] : the affect was normal [Normal Insight/Judgement] : insight and judgment were intact [de-identified] : bilateral wheezing, coarse breath sounds

## 2024-08-13 LAB
ALBUMIN SERPL ELPH-MCNC: 4.4 G/DL
ALP BLD-CCNC: 69 U/L
ALT SERPL-CCNC: 12 U/L
ANION GAP SERPL CALC-SCNC: 13 MMOL/L
AST SERPL-CCNC: 19 U/L
BASOPHILS # BLD AUTO: 0.04 K/UL
BASOPHILS NFR BLD AUTO: 0.4 %
BILIRUB SERPL-MCNC: 0.4 MG/DL
BUN SERPL-MCNC: 9 MG/DL
CALCIUM SERPL-MCNC: 9.7 MG/DL
CHLORIDE SERPL-SCNC: 103 MMOL/L
CO2 SERPL-SCNC: 24 MMOL/L
CREAT SERPL-MCNC: 0.55 MG/DL
EGFR: 125 ML/MIN/1.73M2
EOSINOPHIL # BLD AUTO: 1.49 K/UL
EOSINOPHIL NFR BLD AUTO: 15.1 %
GLUCOSE SERPL-MCNC: 66 MG/DL
HCT VFR BLD CALC: 44.2 %
HGB BLD-MCNC: 14.9 G/DL
IMM GRANULOCYTES NFR BLD AUTO: 0.4 %
LYMPHOCYTES # BLD AUTO: 2.05 K/UL
LYMPHOCYTES NFR BLD AUTO: 20.8 %
MAN DIFF?: NORMAL
MCHC RBC-ENTMCNC: 27.7 PG
MCHC RBC-ENTMCNC: 33.7 GM/DL
MCV RBC AUTO: 82.3 FL
MONOCYTES # BLD AUTO: 0.49 K/UL
MONOCYTES NFR BLD AUTO: 5 %
NEUTROPHILS # BLD AUTO: 5.74 K/UL
NEUTROPHILS NFR BLD AUTO: 58.3 %
PLATELET # BLD AUTO: 272 K/UL
POTASSIUM SERPL-SCNC: 4.3 MMOL/L
PROT SERPL-MCNC: 7.4 G/DL
RBC # BLD: 5.37 M/UL
RBC # FLD: 13.3 %
SODIUM SERPL-SCNC: 141 MMOL/L
WBC # FLD AUTO: 9.85 K/UL

## 2024-08-14 ENCOUNTER — NON-APPOINTMENT (OUTPATIENT)
Age: 34
End: 2024-08-14

## 2024-08-14 ENCOUNTER — RESULT REVIEW (OUTPATIENT)
Age: 34
End: 2024-08-14

## 2024-08-14 ENCOUNTER — APPOINTMENT (OUTPATIENT)
Dept: RADIOLOGY | Facility: CLINIC | Age: 34
End: 2024-08-14
Payer: COMMERCIAL

## 2024-08-14 PROCEDURE — 71046 X-RAY EXAM CHEST 2 VIEWS: CPT | Mod: 26

## 2024-08-14 RX ORDER — HYDROCORTISONE 0.5 %
0.5 OINTMENT (GRAM) TOPICAL TWICE DAILY
Qty: 1 | Refills: 0 | Status: ACTIVE | COMMUNITY
Start: 2024-08-12 | End: 1900-01-01

## 2024-08-22 ENCOUNTER — EMERGENCY (EMERGENCY)
Facility: HOSPITAL | Age: 34
LOS: 0 days | Discharge: HOME | End: 2024-08-22
Attending: STUDENT IN AN ORGANIZED HEALTH CARE EDUCATION/TRAINING PROGRAM
Payer: COMMERCIAL

## 2024-08-22 VITALS
HEART RATE: 70 BPM | SYSTOLIC BLOOD PRESSURE: 109 MMHG | OXYGEN SATURATION: 98 % | DIASTOLIC BLOOD PRESSURE: 57 MMHG | RESPIRATION RATE: 18 BRPM

## 2024-08-22 VITALS
WEIGHT: 109.57 LBS | TEMPERATURE: 98 F | HEART RATE: 85 BPM | RESPIRATION RATE: 18 BRPM | SYSTOLIC BLOOD PRESSURE: 110 MMHG | OXYGEN SATURATION: 96 % | DIASTOLIC BLOOD PRESSURE: 81 MMHG

## 2024-08-22 DIAGNOSIS — R05.8 OTHER SPECIFIED COUGH: ICD-10-CM

## 2024-08-22 DIAGNOSIS — J98.4 OTHER DISORDERS OF LUNG: ICD-10-CM

## 2024-08-22 DIAGNOSIS — J47.9 BRONCHIECTASIS, UNCOMPLICATED: ICD-10-CM

## 2024-08-22 DIAGNOSIS — R06.02 SHORTNESS OF BREATH: ICD-10-CM

## 2024-08-22 DIAGNOSIS — J18.9 PNEUMONIA, UNSPECIFIED ORGANISM: ICD-10-CM

## 2024-08-22 DIAGNOSIS — J45.909 UNSPECIFIED ASTHMA, UNCOMPLICATED: ICD-10-CM

## 2024-08-22 DIAGNOSIS — Z78.9 OTHER SPECIFIED HEALTH STATUS: Chronic | ICD-10-CM

## 2024-08-22 PROCEDURE — 99284 EMERGENCY DEPT VISIT MOD MDM: CPT

## 2024-08-22 PROCEDURE — 94640 AIRWAY INHALATION TREATMENT: CPT

## 2024-08-22 PROCEDURE — 93010 ELECTROCARDIOGRAM REPORT: CPT

## 2024-08-22 PROCEDURE — 93005 ELECTROCARDIOGRAM TRACING: CPT

## 2024-08-22 PROCEDURE — 99285 EMERGENCY DEPT VISIT HI MDM: CPT | Mod: 25

## 2024-08-22 PROCEDURE — 71046 X-RAY EXAM CHEST 2 VIEWS: CPT

## 2024-08-22 PROCEDURE — 71046 X-RAY EXAM CHEST 2 VIEWS: CPT | Mod: 26

## 2024-08-22 RX ORDER — AZITHROMYCIN 250 MG
1 TABLET ORAL
Qty: 1 | Refills: 0
Start: 2024-08-22

## 2024-08-22 RX ORDER — IPRATROPIUM BROMIDE AND ALBUTEROL SULFATE 2.5; .5 MG/3ML; MG/3ML
3 SOLUTION RESPIRATORY (INHALATION)
Refills: 0 | Status: COMPLETED | OUTPATIENT
Start: 2024-08-22 | End: 2024-08-22

## 2024-08-22 RX ORDER — CEFPODOXIME PROXETIL 100 MG
1 TABLET ORAL
Qty: 20 | Refills: 0
Start: 2024-08-22 | End: 2024-08-31

## 2024-08-22 RX ADMIN — IPRATROPIUM BROMIDE AND ALBUTEROL SULFATE 3 MILLILITER(S): 2.5; .5 SOLUTION RESPIRATORY (INHALATION) at 13:51

## 2024-08-22 RX ADMIN — IPRATROPIUM BROMIDE AND ALBUTEROL SULFATE 3 MILLILITER(S): 2.5; .5 SOLUTION RESPIRATORY (INHALATION) at 14:02

## 2024-08-22 RX ADMIN — IPRATROPIUM BROMIDE AND ALBUTEROL SULFATE 3 MILLILITER(S): 2.5; .5 SOLUTION RESPIRATORY (INHALATION) at 13:40

## 2024-08-22 NOTE — ED STATDOCS - PHYSICAL EXAMINATION
Constitutional: NAD   Eyes: PERRLA EOMI  Head: Normocephalic atraumatic  Mouth: MMM  Cardiac: regular rate   Resp: bronchi and wheezing of R lung field, L lung field is clear  GI: Abd s/nt/nd  Neuro: grossly normal and intact  Skin: No visible rashes Constitutional: NAD   Eyes:  EOMI  Head: Normocephalic atraumatic  Mouth: MMM  Cardiac: regular rate   Resp: ronchi and wheezing of R lung field, L lung field is clear  GI: Abd s/nt/nd  Neuro: grossly normal and intact  Skin: No visible rashes

## 2024-08-22 NOTE — ED STATDOCS - OBJECTIVE STATEMENT
34 year-old female with past medical history of asthma and lung disease presents to the ED complaining of shortness of breath which began 2 weeks ago. Pt said she has taken medication for "water in the lungs" previously and also had an xray in November at [facility] [place] ED. Pt endorses asthma and inhaler use. Pt denies fever.   Ferny  used: #[id number]. 34 year-old female with past medical history of asthma and lung disease presents to the ED complaining of productive cough and mild shortness of breath which began 2 weeks ago. Pt with prior pneumonia. Pt endorses asthma and has been using inhaler. Pt denies fever.denies night sweats or bloody cough. no leg swelling.    Ferny  used: #[id number].

## 2024-08-22 NOTE — ED STATDOCS - PROGRESS NOTE DETAILS
34-year-old female past medical history of bronchiectasis, interstitial lung disease, sepsis secondary to left upper lobe pneumonia, admitted to  and November 2023 presents emergency department for cough and shortness of breath for the past 2 weeks.  Saw her PCP on 8/12 was started on prednisone and inhalers, called PCP on 8/15 requesting ABX and was told to come to ED for further evaluation. Vitals are stable on arrival. On exam patient nontoxic appearing, in no acute distress, +diffuse rhonchi and wheezing throughout right lung, left lung clear, heart RRR, rest of exam unremarkable. Plan for XR, r/o PNA, duonebs, reassess. - Farrah Liu PA-C X-ray reviewed, positive right-sided pneumonia.  Patient reassessed after DuoNeb's.  Wheezing improved, still with rhonchi.  Patient is oxygenating well, not septic.  Will send home with cefpodoxime and Z-Justin and have patient follow-up with her pulmonologist and primary care physician outpatient.  Strict return precautions to the emergency department were discussed.  Patient and family verbally expressed understanding.  All questions and concerns were addressed. [facility]  ID #[id number]. - Farrah Liu PA-C

## 2024-08-22 NOTE — ED STATDOCS - CLINICAL SUMMARY MEDICAL DECISION MAKING FREE TEXT BOX
Otherwise healthy 28 year-old female with history of pneumonia presents with cough and shortness of breath. Pt sent in by outside doctor for likely pneumonia. Will evaluate with chest xray and give breathing treatment for wheezing on exam. Pt not septic at this time. 28 year-old female with history of pneumonia presents with cough and shortness of breath. Pt sent in by outside doctor for likely pneumonia. Will evaluate with chest xray and give breathing treatment for wheezing on exam. Pt not septic at this time.

## 2024-08-22 NOTE — ED ADULT TRIAGE NOTE - CHIEF COMPLAINT QUOTE
PT presents to the ED for SOB, chest pain , and cough for 2 weeks. pt is well appearing 94% on room air.  in triage. no other complaints at this time. pt taken for stat ekg.

## 2024-08-22 NOTE — ED STATDOCS - PATIENT PORTAL LINK FT
You can access the FollowMyHealth Patient Portal offered by [facility] by registering at the following website: [email or web address]. By joining [employer]’s FollowMyHealth portal, you will also be able to view your health information using other applications (apps) compatible with our system.

## 2024-08-28 ENCOUNTER — APPOINTMENT (OUTPATIENT)
Dept: INTERNAL MEDICINE | Facility: CLINIC | Age: 34
End: 2024-08-28
Payer: COMMERCIAL

## 2024-08-28 VITALS
HEART RATE: 94 BPM | SYSTOLIC BLOOD PRESSURE: 108 MMHG | BODY MASS INDEX: 21.21 KG/M2 | RESPIRATION RATE: 14 BRPM | TEMPERATURE: 98.1 F | WEIGHT: 108.02 LBS | OXYGEN SATURATION: 97 % | DIASTOLIC BLOOD PRESSURE: 72 MMHG | HEIGHT: 60 IN

## 2024-08-28 DIAGNOSIS — J84.9 INTERSTITIAL PULMONARY DISEASE, UNSPECIFIED: ICD-10-CM

## 2024-08-28 PROCEDURE — 99213 OFFICE O/P EST LOW 20 MIN: CPT

## 2024-08-28 NOTE — ASSESSMENT
[FreeTextEntry1] : ILD: Recommended continue prednisone, symbicort and nebulizers. She completed abx. Arranged for appt with Dr Chandler next week.

## 2024-08-28 NOTE — PHYSICAL EXAM
[No Acute Distress] : no acute distress [Normal Sclera/Conjunctiva] : normal sclera/conjunctiva [PERRL] : pupils equal round and reactive to light [EOMI] : extraocular movements intact [No Lymphadenopathy] : no lymphadenopathy [Supple] : supple [No Respiratory Distress] : no respiratory distress  [No Accessory Muscle Use] : no accessory muscle use [de-identified] : diffuse expiratory wheezes

## 2024-08-28 NOTE — HISTORY OF PRESENT ILLNESS
[FreeTextEntry8] : 34F presents for follow-up after presenting to ED on 8/22 for dyspnea. Xray was done and showed changes suggestive of ILD but no accute process. Based on nurse's note (unable to find physician note in HIE) patient was discharged with cefpodoxime, azithromycin and instructed to follow-up. Since then, she still reports dyspnea but it has been controlled.

## 2024-08-28 NOTE — REVIEW OF SYSTEMS
[Fever] : no fever [Chills] : no chills [Night Sweats] : no night sweats [Chest Pain] : no chest pain [Palpitations] : no palpitations [Lower Ext Edema] : no lower extremity edema [Shortness Of Breath] : shortness of breath [Wheezing] : wheezing [Cough] : no cough [Dyspnea on Exertion] : dyspnea on exertion [Abdominal Pain] : no abdominal pain [Nausea] : no nausea [Vomiting] : no vomiting [Dysuria] : no dysuria Other (Free Text): Denies TB hx or CHF. Render Risk Assessment In Note?: no Detail Level: Detailed Note Text (......Xxx Chief Complaint.): This diagnosis correlates with the

## 2024-09-06 ENCOUNTER — APPOINTMENT (OUTPATIENT)
Dept: PULMONOLOGY | Facility: CLINIC | Age: 34
End: 2024-09-06

## 2024-09-09 ENCOUNTER — APPOINTMENT (OUTPATIENT)
Dept: INTERNAL MEDICINE | Facility: CLINIC | Age: 34
End: 2024-09-09
Payer: COMMERCIAL

## 2024-09-09 VITALS
HEIGHT: 60 IN | TEMPERATURE: 97.8 F | SYSTOLIC BLOOD PRESSURE: 106 MMHG | RESPIRATION RATE: 14 BRPM | WEIGHT: 108 LBS | HEART RATE: 93 BPM | BODY MASS INDEX: 21.2 KG/M2 | DIASTOLIC BLOOD PRESSURE: 70 MMHG | OXYGEN SATURATION: 98 %

## 2024-09-09 DIAGNOSIS — J47.9 BRONCHIECTASIS, UNCOMPLICATED: ICD-10-CM

## 2024-09-09 PROCEDURE — 99214 OFFICE O/P EST MOD 30 MIN: CPT

## 2024-09-09 NOTE — PLAN
[FreeTextEntry1] : Needs pulmonary follow up Check CT chest Start Prednisone and Doxycycline. Follow up in 48-72 hrs if no improvement.

## 2024-09-09 NOTE — PHYSICAL EXAM
[No Acute Distress] : no acute distress [Well Developed] : well developed [Well-Appearing] : well-appearing [Normal Sclera/Conjunctiva] : normal sclera/conjunctiva [PERRL] : pupils equal round and reactive to light [Normal Outer Ear/Nose] : the outer ears and nose were normal in appearance [Normal Oropharynx] : the oropharynx was normal [No JVD] : no jugular venous distention [No Lymphadenopathy] : no lymphadenopathy [No Respiratory Distress] : no respiratory distress  [No Accessory Muscle Use] : no accessory muscle use [Normal Rate] : normal rate  [Regular Rhythm] : with a regular rhythm [No Edema] : there was no peripheral edema [Soft] : abdomen soft [Non Tender] : non-tender [Non-distended] : non-distended [No HSM] : no HSM [Normal Bowel Sounds] : normal bowel sounds [Normal Posterior Cervical Nodes] : no posterior cervical lymphadenopathy [Normal Anterior Cervical Nodes] : no anterior cervical lymphadenopathy [No CVA Tenderness] : no CVA  tenderness [No Spinal Tenderness] : no spinal tenderness [No Joint Swelling] : no joint swelling [Grossly Normal Strength/Tone] : grossly normal strength/tone [No Rash] : no rash [Normal Gait] : normal gait [Normal Affect] : the affect was normal [Normal Insight/Judgement] : insight and judgment were intact [de-identified] : bilateral coarse breath sounds, mild wheezing

## 2024-09-09 NOTE — HISTORY OF PRESENT ILLNESS
[FreeTextEntry8] : 34 yr old female here for an acute visit. Has bronchiectasis recent visit and ED visit reviewed. Now has recurrent cough and white phlegm for over a week no fever feels she is always short of breath recent D&C [Other: _____] : [unfilled]

## 2024-09-13 ENCOUNTER — NON-APPOINTMENT (OUTPATIENT)
Age: 34
End: 2024-09-13

## 2024-09-13 ENCOUNTER — APPOINTMENT (OUTPATIENT)
Dept: PULMONOLOGY | Facility: CLINIC | Age: 34
End: 2024-09-13
Payer: COMMERCIAL

## 2024-09-13 VITALS
HEIGHT: 60 IN | BODY MASS INDEX: 21.99 KG/M2 | HEART RATE: 110 BPM | WEIGHT: 112 LBS | OXYGEN SATURATION: 96 % | DIASTOLIC BLOOD PRESSURE: 80 MMHG | SYSTOLIC BLOOD PRESSURE: 124 MMHG

## 2024-09-13 DIAGNOSIS — J47.9 BRONCHIECTASIS, UNCOMPLICATED: ICD-10-CM

## 2024-09-13 PROCEDURE — 71046 X-RAY EXAM CHEST 2 VIEWS: CPT

## 2024-09-13 PROCEDURE — 99214 OFFICE O/P EST MOD 30 MIN: CPT | Mod: 25

## 2024-09-13 PROCEDURE — 94010 BREATHING CAPACITY TEST: CPT

## 2024-09-13 RX ORDER — FLUTICASONE FUROATE AND VILANTEROL TRIFENATATE 200; 25 UG/1; UG/1
200-25 POWDER RESPIRATORY (INHALATION)
Qty: 1 | Refills: 0 | Status: DISCONTINUED | COMMUNITY
Start: 2024-09-09 | End: 2024-09-13

## 2024-09-13 NOTE — HISTORY OF PRESENT ILLNESS
[TextBox_4] : severe bronchiectasis likely ABPA infrequent follow up appears she has been hospitalized a few times since I last saw her  she states she is currently taking budesonide nebs, and another neb (probably albuterol) also symbicort bid and montelukast recently given prednisone, doxy has increased cough and sputum

## 2024-09-13 NOTE — ASSESSMENT
[FreeTextEntry1] : I had a long conversation with patient again via  regarding her severe lung disease.  We have options for treatment but I need to see her on a more consistent basis.   I am hesitant to start any long term or high risk treatment such as high dose prednisone/vori/biologics given her inconsistency with follow up.  She has agreed to follow up in 2 weeks once she has completed prednisone and doxy, then will likely require fu at least monthly.  She agrees to this.  If we are able to establish a consistent follow up schedule I will likely put her on a long course of prednisone for ABPA.  We also need to continue with airway clearance/nebs/symbicort.   I gave her my office number and office managers name to arrange for fu and acute appts when needed.  she understands and agrees to above.

## 2024-09-13 NOTE — PROCEDURE
[FreeTextEntry1] : CXR: increased nodular opacities CHARLY, no focal consolidation or pleural effusions, cardiac silhouette appears normal.  No bony abnormality.  Prior records reviewed:   ACC: [id number] EXAM: XR CHEST PA LAT 2V ORDERED BY: MARJORIE MCCRARY  PROCEDURE DATE: 08/22/2024    INTERPRETATION: Chest 2 views  CLINICAL HISTORY: Cough and shortness of breath  Comparison: 8/14/2024 and CT chest of 1/22/2024  Normal heart and mediastinum. Interstitial lung changes are again identified with upper lobe areas of fibrosis extending into the suprahilar region bilaterally. The overall pattern is similar to the prior chest x-ray with no definite superimposed acute process apparent. Angles sharp. Bones without acute abnormality.  IMPRESSION: Diffuse chronic interstitial lung changes with areas of upper lobe fibrosis again noted. No definite superimposed acute process is appreciated. If clinically warranted additional evaluation may include CT chest  --- End of Report ---      ALLA MONTANO MD; Attending Radiologist This document has been electronically signed. Aug 24 2024 9:39AM    EXAM: [id number] - CT CHEST - ORDERED BY: WANDA VOGT   PROCEDURE DATE: 01/22/2024    INTERPRETATION: EXAMINATION: CT CHEST  CLINICAL INDICATION: Pneumonia.  TECHNIQUE: Noncontrast CT of the chest was obtained.  COMPARISON: Multiple CT, most recent 11/24/2023.  FINDINGS:  AIRWAYS AND LUNGS: The central tracheobronchial tree is patent. Bilateral bronchiectasis and associated architectural distortion. Near-complete resolution prior left upper lobe consolidation with residual left upper lobe patchy nodular opacities. Redemonstrated nodular/clustered right lower lobe opacities and ill-defined linear right upper lobe opacities.  MEDIASTINUM AND PLEURA: Mildly enlarged mediastinal lymph nodes The visualized portion of the thyroid gland is unremarkable. There is no pleural effusion. There is no pneumothorax.  HEART AND VESSELS: The heart is normal in size. There are no atherosclerotic calcifications of the aorta. There is no pericardial effusion.  UPPER ABDOMEN: Images of the upper abdomen demonstrate no abnormality.  BONES AND SOFT TISSUES: The bones are unremarkable. The soft tissues are unremarkable.   IMPRESSION: Bilateral bronchiectasis and associated architectural distortion. Near-complete resolution prior left upper lobe consolidation with residual left upper lobe patchy nodular opacities. Continued follow-up CT recommended.  --- End of Report ---       JACQUELINE PHILLIPS MD; Attending Radiologist This document has been electronically signed. Feb 1 2024 11:40PM   CXR: no focal consolidation, increased interstital markings in keeping with her severe bronchiectasis.  [facility] records reviewed--severe CHARLY pneumonia  PFT: restricted.  Lung volumes low. DLCO moderately reduced.   EXAM: [id number] - CT CHEST  - ORDERED BY: MAGDY RAMIREZ   PROCEDURE DATE:  08/23/2023    INTERPRETATION:  CLINICAL INFORMATION: Abnormal chest x-ray  COMPARISON: Chest x-ray 8/15/2023  CONTRAST/COMPLICATIONS: IV Contrast: NONE Oral Contrast: NONE Complications: None reported at time of study completion  PROCEDURE: CT scan of the chest was obtained without intravenous contrast.  FINDINGS:  LYMPH NODES: No enlarged thoracic lymph nodes.  HEART/VASCULATURE: The heart is normal in size. Levocardia. No pericardial effusion. No coronary artery calcification.  AIRWAYS/LUNGS/PLEURA: The trachea is patent. There is central cystic bronchiectasis involving all lobes with upper lobe predominance, with architectural distortion and biapical scarring. Branching tubular opacities are likely secondary to airway impaction, with scattered areas of tree-in-bud nodularity. Some of the central impacted airways in the left upper lobe demonstrated high density secretions which can be seen in the setting of allergic bronchopulmonary aspergillosis.  UPPER ABDOMEN: Normal situs. Nonobstructing right upper pole and interpolar kidney stone, each measuring 2 mm. No hydronephrosis.  BONES/SOFT TISSUES: Unremarkable.  IMPRESSION: Central cystic bronchiectasis involving all lobes of an upper lobe predominance, with associated architectural distortion/scarring and airway impaction. Diagnostic considerations include cystic fibrosis, Lucho-Coleman syndrome, allergic bronchopulmonary aspergillosis (ABPA), among other etiologies.    --- End of Report ---      MARGARET GALVEZ MD; Resident Radiologist This document has been electronically signed. MOLLY TUTTLE M.D., Attending Radiologist This document has been electronically signed. Aug 30 2023 12:06PM

## 2024-09-13 NOTE — REASON FOR VISIT
[Follow-Up] : a follow-up visit [Bronchiectasis] : bronchiectasis [Other: ______] : provided by [facility] [Time Spent: ____ minutes] : Total time spent using  services: [unfilled] minutes. The patient's primary language is not English thus required  services. [Interpreters_IDNumber] : [id number]

## 2024-09-30 ENCOUNTER — APPOINTMENT (OUTPATIENT)
Dept: PULMONOLOGY | Facility: CLINIC | Age: 34
End: 2024-09-30
Payer: COMMERCIAL

## 2024-09-30 ENCOUNTER — NON-APPOINTMENT (OUTPATIENT)
Age: 34
End: 2024-09-30

## 2024-09-30 VITALS
SYSTOLIC BLOOD PRESSURE: 114 MMHG | WEIGHT: 112 LBS | BODY MASS INDEX: 21.99 KG/M2 | OXYGEN SATURATION: 98 % | DIASTOLIC BLOOD PRESSURE: 78 MMHG | HEIGHT: 60 IN | HEART RATE: 87 BPM

## 2024-09-30 DIAGNOSIS — J47.9 BRONCHIECTASIS, UNCOMPLICATED: ICD-10-CM

## 2024-09-30 PROCEDURE — 36415 COLL VENOUS BLD VENIPUNCTURE: CPT

## 2024-09-30 PROCEDURE — 99214 OFFICE O/P EST MOD 30 MIN: CPT | Mod: 25

## 2024-09-30 PROCEDURE — 94618 PULMONARY STRESS TESTING: CPT

## 2024-09-30 PROCEDURE — 71046 X-RAY EXAM CHEST 2 VIEWS: CPT

## 2024-09-30 NOTE — HISTORY OF PRESENT ILLNESS
[Never] : never [TextBox_4] : here to fu  cough improved breathing still short gets pain under right rib with deep breaths

## 2024-09-30 NOTE — PROCEDURE
[FreeTextEntry1] : CXR: improvement in CHARLY nodular opacities, stable chronic lung disease otherwise, no effusion  exercise oximetry: no significant O2 desat with 6 min of walking on room air  Prior records reviewed: CXR: increased nodular opacities CHARLY, no focal consolidation or pleural effusions, cardiac silhouette appears normal.  No bony abnormality.   ACC: [id number] EXAM: XR CHEST PA LAT 2V ORDERED BY: MARJORIE MCCRARY  PROCEDURE DATE: 08/22/2024    INTERPRETATION: Chest 2 views  CLINICAL HISTORY: Cough and shortness of breath  Comparison: 8/14/2024 and CT chest of 1/22/2024  Normal heart and mediastinum. Interstitial lung changes are again identified with upper lobe areas of fibrosis extending into the suprahilar region bilaterally. The overall pattern is similar to the prior chest x-ray with no definite superimposed acute process apparent. Angles sharp. Bones without acute abnormality.  IMPRESSION: Diffuse chronic interstitial lung changes with areas of upper lobe fibrosis again noted. No definite superimposed acute process is appreciated. If clinically warranted additional evaluation may include CT chest  --- End of Report ---      ALLA MONTANO MD; Attending Radiologist This document has been electronically signed. Aug 24 2024 9:39AM    EXAM: [id number] - CT CHEST - ORDERED BY: WANDA VOGT   PROCEDURE DATE: 01/22/2024    INTERPRETATION: EXAMINATION: CT CHEST  CLINICAL INDICATION: Pneumonia.  TECHNIQUE: Noncontrast CT of the chest was obtained.  COMPARISON: Multiple CT, most recent 11/24/2023.  FINDINGS:  AIRWAYS AND LUNGS: The central tracheobronchial tree is patent. Bilateral bronchiectasis and associated architectural distortion. Near-complete resolution prior left upper lobe consolidation with residual left upper lobe patchy nodular opacities. Redemonstrated nodular/clustered right lower lobe opacities and ill-defined linear right upper lobe opacities.  MEDIASTINUM AND PLEURA: Mildly enlarged mediastinal lymph nodes The visualized portion of the thyroid gland is unremarkable. There is no pleural effusion. There is no pneumothorax.  HEART AND VESSELS: The heart is normal in size. There are no atherosclerotic calcifications of the aorta. There is no pericardial effusion.  UPPER ABDOMEN: Images of the upper abdomen demonstrate no abnormality.  BONES AND SOFT TISSUES: The bones are unremarkable. The soft tissues are unremarkable.   IMPRESSION: Bilateral bronchiectasis and associated architectural distortion. Near-complete resolution prior left upper lobe consolidation with residual left upper lobe patchy nodular opacities. Continued follow-up CT recommended.  --- End of Report ---       JACQUELINE PHILLIPS MD; Attending Radiologist This document has been electronically signed. Feb 1 2024 11:40PM   CXR: no focal consolidation, increased interstital markings in keeping with her severe bronchiectasis.  [facility] records reviewed--severe CHARLY pneumonia  PFT: restricted.  Lung volumes low. DLCO moderately reduced.   EXAM: [id number] - CT CHEST  - ORDERED BY: MAGDY RAMIREZ   PROCEDURE DATE:  08/23/2023    INTERPRETATION:  CLINICAL INFORMATION: Abnormal chest x-ray  COMPARISON: Chest x-ray 8/15/2023  CONTRAST/COMPLICATIONS: IV Contrast: NONE Oral Contrast: NONE Complications: None reported at time of study completion  PROCEDURE: CT scan of the chest was obtained without intravenous contrast.  FINDINGS:  LYMPH NODES: No enlarged thoracic lymph nodes.  HEART/VASCULATURE: The heart is normal in size. Levocardia. No pericardial effusion. No coronary artery calcification.  AIRWAYS/LUNGS/PLEURA: The trachea is patent. There is central cystic bronchiectasis involving all lobes with upper lobe predominance, with architectural distortion and biapical scarring. Branching tubular opacities are likely secondary to airway impaction, with scattered areas of tree-in-bud nodularity. Some of the central impacted airways in the left upper lobe demonstrated high density secretions which can be seen in the setting of allergic bronchopulmonary aspergillosis.  UPPER ABDOMEN: Normal situs. Nonobstructing right upper pole and interpolar kidney stone, each measuring 2 mm. No hydronephrosis.  BONES/SOFT TISSUES: Unremarkable.  IMPRESSION: Central cystic bronchiectasis involving all lobes of an upper lobe predominance, with associated architectural distortion/scarring and airway impaction. Diagnostic considerations include cystic fibrosis, Lucho-Coleman syndrome, allergic bronchopulmonary aspergillosis (ABPA), among other etiologies.    --- End of Report ---      MARGARET GALVEZ MD; Resident Radiologist This document has been electronically signed. MOLLY TUTTLE M.D., Attending Radiologist This document has been electronically signed. Aug 30 2023 12:06PM

## 2024-10-01 LAB — DEPRECATED D DIMER PPP IA-ACNC: 173 NG/ML DDU

## 2024-10-31 ENCOUNTER — LABORATORY RESULT (OUTPATIENT)
Age: 34
End: 2024-10-31

## 2024-10-31 ENCOUNTER — APPOINTMENT (OUTPATIENT)
Dept: PULMONOLOGY | Facility: CLINIC | Age: 34
End: 2024-10-31
Payer: COMMERCIAL

## 2024-10-31 VITALS
SYSTOLIC BLOOD PRESSURE: 121 MMHG | HEART RATE: 91 BPM | BODY MASS INDEX: 23.36 KG/M2 | WEIGHT: 119 LBS | HEIGHT: 60 IN | DIASTOLIC BLOOD PRESSURE: 84 MMHG | OXYGEN SATURATION: 96 %

## 2024-10-31 DIAGNOSIS — J47.9 BRONCHIECTASIS, UNCOMPLICATED: ICD-10-CM

## 2024-10-31 PROCEDURE — 71046 X-RAY EXAM CHEST 2 VIEWS: CPT

## 2024-10-31 PROCEDURE — 36415 COLL VENOUS BLD VENIPUNCTURE: CPT

## 2024-10-31 PROCEDURE — 99214 OFFICE O/P EST MOD 30 MIN: CPT

## 2024-11-04 DIAGNOSIS — R10.9 UNSPECIFIED ABDOMINAL PAIN: ICD-10-CM

## 2024-11-04 LAB
A ALTERNATA IGE QN: 3.51 KUA/L
A ALTERNATA IGE QN: 3.51 KUA/L
A FUMIGATUS IGE QN: 49.4 KUA/L
A FUMIGATUS IGE QN: 49.4 KUA/L
ALBUMIN SERPL ELPH-MCNC: 4.5 G/DL
ALMOND IGE QN: <0.1 KUA/L
ALP BLD-CCNC: 59 U/L
ALT SERPL-CCNC: 13 U/L
ANION GAP SERPL CALC-SCNC: 20 MMOL/L
APPEARANCE: CLEAR
AST SERPL-CCNC: 15 U/L
BACTERIA UR CULT: NORMAL
BACTERIA: ABNORMAL /HPF
BASOPHILS # BLD AUTO: 0.06 K/UL
BASOPHILS NFR BLD AUTO: 0.7 %
BERMUDA GRASS IGE QN: <0.1 KUA/L
BILIRUB SERPL-MCNC: 0.2 MG/DL
BILIRUBIN URINE: NEGATIVE
BLOOD URINE: ABNORMAL
BOXELDER IGE QN: <0.1 KUA/L
BRAZIL NUT IGE QN: <0.1 KUA/L
BUN SERPL-MCNC: 14 MG/DL
C ALBICANS IGE QN: 1.01 KUA/L
C HERBARUM IGE QN: 0.37 KUA/L
C HERBARUM IGE QN: 0.37 KUA/L
CALCIUM SERPL-MCNC: 9.5 MG/DL
CALIF WALNUT IGE QN: 0.11 KUA/L
CASHEW NUT IGE QN: <0.1 KUA/L
CAST: 2 /LPF
CAT DANDER IGE QN: <0.1 KUA/L
CAT DANDER IGE QN: <0.1 KUA/L
CHLORIDE SERPL-SCNC: 101 MMOL/L
CMN PIGWEED IGE QN: <0.1 KUA/L
CO2 SERPL-SCNC: 20 MMOL/L
CODFISH IGE QN: <0.1 KUA/L
COLOR: YELLOW
COMMON RAGWEED IGE QN: <0.1 KUA/L
COMMON RAGWEED IGE QN: <0.1 KUA/L
COTTONWOOD IGE QN: <0.1 KUA/L
COW MILK IGE QN: <0.1 KUA/L
CREAT SERPL-MCNC: 0.86 MG/DL
D FARINAE IGE QN: 0.55 KUA/L
D FARINAE IGE QN: 0.55 KUA/L
D PTERONYSS IGE QN: 0.49 KUA/L
D PTERONYSS IGE QN: 0.49 KUA/L
DEPRECATED A ALTERNATA IGE RAST QL: 3
DEPRECATED A ALTERNATA IGE RAST QL: 3
DEPRECATED A FUMIGATUS IGE RAST QL: 4
DEPRECATED A FUMIGATUS IGE RAST QL: 4
DEPRECATED ALMOND IGE RAST QL: 0
DEPRECATED BERMUDA GRASS IGE RAST QL: 0
DEPRECATED BOXELDER IGE RAST QL: 0
DEPRECATED BRAZIL NUT IGE RAST QL: 0
DEPRECATED C ALBICANS IGE RAST QL: 2
DEPRECATED C HERBARUM IGE RAST QL: 1
DEPRECATED C HERBARUM IGE RAST QL: 1
DEPRECATED CASHEW NUT IGE RAST QL: 0
DEPRECATED CAT DANDER IGE RAST QL: 0
DEPRECATED CAT DANDER IGE RAST QL: 0
DEPRECATED CODFISH IGE RAST QL: 0
DEPRECATED COMMON PIGWEED IGE RAST QL: 0
DEPRECATED COMMON RAGWEED IGE RAST QL: 0
DEPRECATED COMMON RAGWEED IGE RAST QL: 0
DEPRECATED COTTONWOOD IGE RAST QL: 0
DEPRECATED COW MILK IGE RAST QL: 0
DEPRECATED D FARINAE IGE RAST QL: 1
DEPRECATED D FARINAE IGE RAST QL: 1
DEPRECATED D PTERONYSS IGE RAST QL: 1
DEPRECATED D PTERONYSS IGE RAST QL: 1
DEPRECATED DOG DANDER IGE RAST QL: 0
DEPRECATED DOG DANDER IGE RAST QL: 0
DEPRECATED EGG WHITE IGE RAST QL: 0
DEPRECATED GOOSEFOOT IGE RAST QL: NORMAL
DEPRECATED HAZELNUT IGE RAST QL: 0
DEPRECATED LONDON PLANE IGE RAST QL: 0
DEPRECATED M RACEMOSUS IGE RAST QL: NORMAL
DEPRECATED MOUSE URINE PROT IGE RAST QL: 0
DEPRECATED MUGWORT IGE RAST QL: 0
DEPRECATED P NOTATUM IGE RAST QL: 3
DEPRECATED PEANUT IGE RAST QL: 0
DEPRECATED RED CEDAR IGE RAST QL: NORMAL
DEPRECATED ROACH IGE RAST QL: 1
DEPRECATED ROACH IGE RAST QL: 1
DEPRECATED SALMON IGE RAST QL: 0
DEPRECATED SCALLOP IGE RAST QL: <0.1 KUA/L
DEPRECATED SESAME SEED IGE RAST QL: NORMAL
DEPRECATED SHEEP SORREL IGE RAST QL: 0
DEPRECATED SHRIMP IGE RAST QL: 2
DEPRECATED SILVER BIRCH IGE RAST QL: 0
DEPRECATED SOYBEAN IGE RAST QL: 0
DEPRECATED TIMOTHY IGE RAST QL: NORMAL
DEPRECATED TIMOTHY IGE RAST QL: NORMAL
DEPRECATED TUNA IGE RAST QL: 0
DEPRECATED WALNUT IGE RAST QL: 0
DEPRECATED WHEAT IGE RAST QL: 0
DEPRECATED WHITE ASH IGE RAST QL: 0
DEPRECATED WHITE OAK IGE RAST QL: NORMAL
DEPRECATED WHITE OAK IGE RAST QL: NORMAL
DOG DANDER IGE QN: <0.1 KUA/L
DOG DANDER IGE QN: <0.1 KUA/L
EGFR: 92 ML/MIN/1.73M2
EGG WHITE IGE QN: <0.1 KUA/L
EOSINOPHIL # BLD AUTO: 0.44 K/UL
EOSINOPHIL NFR BLD AUTO: 5 %
EPITHELIAL CELLS: 13 /HPF
GLUCOSE QUALITATIVE U: NEGATIVE MG/DL
GLUCOSE SERPL-MCNC: 89 MG/DL
GOOSEFOOT IGE QN: 0.16 KUA/L
HAZELNUT IGE QN: <0.1 KUA/L
HCT VFR BLD CALC: 41.2 %
HGB BLD-MCNC: 12.9 G/DL
IMM GRANULOCYTES NFR BLD AUTO: 0.3 %
KETONES URINE: NEGATIVE MG/DL
LEUKOCYTE ESTERASE URINE: ABNORMAL
LONDON PLANE IGE QN: <0.1 KUA/L
LYMPHOCYTES # BLD AUTO: 2.31 K/UL
LYMPHOCYTES NFR BLD AUTO: 26.4 %
M RACEMOSUS IGE QN: 0.18 KUA/L
MAN DIFF?: NORMAL
MCHC RBC-ENTMCNC: 27.4 PG
MCHC RBC-ENTMCNC: 31.3 G/DL
MCV RBC AUTO: 87.5 FL
MICROSCOPIC-UA: NORMAL
MONOCYTES # BLD AUTO: 0.66 K/UL
MONOCYTES NFR BLD AUTO: 7.5 %
MOUSE URINE PROT IGE QN: <0.1 KUA/L
MUGWORT IGE QN: <0.1 KUA/L
MULBERRY (T70) CLASS: 0
MULBERRY (T70) CONC: <0.1 KUA/L
NEUTROPHILS # BLD AUTO: 5.26 K/UL
NEUTROPHILS NFR BLD AUTO: 60.1 %
NITRITE URINE: NEGATIVE
P NOTATUM IGE QN: 5.73 KUA/L
PEANUT IGE QN: <0.1 KUA/L
PH URINE: 5.5
PLATELET # BLD AUTO: 283 K/UL
POTASSIUM SERPL-SCNC: 3.9 MMOL/L
PROT SERPL-MCNC: 7.1 G/DL
PROTEIN URINE: NEGATIVE MG/DL
RBC # BLD: 4.71 M/UL
RBC # FLD: 14.5 %
RED BLOOD CELLS URINE: 10 /HPF
RED CEDAR IGE QN: 0.17 KUA/L
ROACH IGE QN: 0.37 KUA/L
ROACH IGE QN: 0.37 KUA/L
SALMON IGE QN: <0.1 KUA/L
SCALLOP IGE QN: 0
SCALLOP IGE QN: 1.38 KUA/L
SESAME SEED IGE QN: 0.24 KUA/L
SHEEP SORREL IGE QN: <0.1 KUA/L
SILVER BIRCH IGE QN: <0.1 KUA/L
SODIUM SERPL-SCNC: 142 MMOL/L
SOYBEAN IGE QN: <0.1 KUA/L
SPECIFIC GRAVITY URINE: 1.01
TIMOTHY IGE QN: 0.1 KUA/L
TIMOTHY IGE QN: 0.1 KUA/L
TOTAL IGE SMQN RAST: 1943 KU/L
TOTAL IGE SMQN RAST: 1943 KU/L
TREE ALLERG MIX1 IGE QL: NORMAL
TUNA IGE QN: <0.1 KUA/L
UROBILINOGEN URINE: 0.2 MG/DL
WALNUT IGE QN: <0.1 KUA/L
WBC # FLD AUTO: 8.76 K/UL
WHEAT IGE QN: <0.1 KUA/L
WHITE ASH IGE QN: <0.1 KUA/L
WHITE BLOOD CELLS URINE: 10 /HPF
WHITE ELM IGE QN: 0
WHITE ELM IGE QN: <0.1 KUA/L
WHITE OAK IGE QN: 0.14 KUA/L
WHITE OAK IGE QN: 0.14 KUA/L

## 2024-11-05 LAB
A FLAVUS AB FLD QL: NEGATIVE
A FUMIGATUS AB FLD QL: NEGATIVE
A NIGER AB FLD QL: NEGATIVE
GALACTOMANNAN AG SERPL QL IA: 0.03 INDEX

## 2024-11-06 ENCOUNTER — NON-APPOINTMENT (OUTPATIENT)
Age: 34
End: 2024-11-06

## 2024-11-12 ENCOUNTER — APPOINTMENT (OUTPATIENT)
Dept: OBGYN | Facility: CLINIC | Age: 34
End: 2024-11-12

## 2024-11-21 ENCOUNTER — APPOINTMENT (OUTPATIENT)
Dept: PULMONOLOGY | Facility: CLINIC | Age: 34
End: 2024-11-21

## 2024-11-22 ENCOUNTER — APPOINTMENT (OUTPATIENT)
Dept: PULMONOLOGY | Facility: CLINIC | Age: 34
End: 2024-11-22
Payer: MEDICAID

## 2024-11-22 VITALS
DIASTOLIC BLOOD PRESSURE: 85 MMHG | HEART RATE: 82 BPM | BODY MASS INDEX: 23.36 KG/M2 | OXYGEN SATURATION: 97 % | SYSTOLIC BLOOD PRESSURE: 129 MMHG | HEIGHT: 60 IN | WEIGHT: 119 LBS

## 2024-11-22 DIAGNOSIS — R31.9 HEMATURIA, UNSPECIFIED: ICD-10-CM

## 2024-11-22 DIAGNOSIS — B44.81 ALLERGIC BRONCHOPULMONARY ASPERGILLOSIS: ICD-10-CM

## 2024-11-22 PROCEDURE — G2211 COMPLEX E/M VISIT ADD ON: CPT | Mod: NC

## 2024-11-22 PROCEDURE — T1013: CPT

## 2024-11-22 PROCEDURE — 99214 OFFICE O/P EST MOD 30 MIN: CPT

## 2024-11-22 RX ORDER — PREDNISONE 50 MG/1
50 TABLET ORAL
Qty: 30 | Refills: 0 | Status: ACTIVE | COMMUNITY
Start: 2024-11-22 | End: 1900-01-01

## 2024-12-02 ENCOUNTER — APPOINTMENT (OUTPATIENT)
Dept: INTERNAL MEDICINE | Facility: CLINIC | Age: 34
End: 2024-12-02

## 2024-12-12 ENCOUNTER — APPOINTMENT (OUTPATIENT)
Dept: PULMONOLOGY | Facility: CLINIC | Age: 34
End: 2024-12-12
Payer: COMMERCIAL

## 2024-12-12 VITALS
WEIGHT: 119 LBS | HEART RATE: 104 BPM | OXYGEN SATURATION: 94 % | SYSTOLIC BLOOD PRESSURE: 121 MMHG | DIASTOLIC BLOOD PRESSURE: 82 MMHG | BODY MASS INDEX: 23.36 KG/M2 | HEIGHT: 60 IN

## 2024-12-12 DIAGNOSIS — J47.9 BRONCHIECTASIS, UNCOMPLICATED: ICD-10-CM

## 2024-12-12 DIAGNOSIS — B44.81 ALLERGIC BRONCHOPULMONARY ASPERGILLOSIS: ICD-10-CM

## 2024-12-12 PROCEDURE — 99214 OFFICE O/P EST MOD 30 MIN: CPT | Mod: 25

## 2024-12-12 RX ORDER — PREDNISONE 10 MG/1
10 TABLET ORAL
Qty: 90 | Refills: 0 | Status: ACTIVE | COMMUNITY
Start: 2024-12-12 | End: 1900-01-01

## 2025-01-09 ENCOUNTER — APPOINTMENT (OUTPATIENT)
Dept: PULMONOLOGY | Facility: CLINIC | Age: 35
End: 2025-01-09
Payer: COMMERCIAL

## 2025-01-09 VITALS
HEIGHT: 60 IN | SYSTOLIC BLOOD PRESSURE: 118 MMHG | HEART RATE: 94 BPM | DIASTOLIC BLOOD PRESSURE: 76 MMHG | WEIGHT: 120 LBS | OXYGEN SATURATION: 96 % | BODY MASS INDEX: 23.56 KG/M2

## 2025-01-09 DIAGNOSIS — B44.81 ALLERGIC BRONCHOPULMONARY ASPERGILLOSIS: ICD-10-CM

## 2025-01-09 DIAGNOSIS — J47.9 BRONCHIECTASIS, UNCOMPLICATED: ICD-10-CM

## 2025-01-09 PROCEDURE — 99214 OFFICE O/P EST MOD 30 MIN: CPT | Mod: 25

## 2025-01-09 RX ORDER — LEVOFLOXACIN 750 MG/1
750 TABLET, FILM COATED ORAL DAILY
Qty: 7 | Refills: 0 | Status: ACTIVE | COMMUNITY
Start: 2025-01-09 | End: 1900-01-01

## 2025-01-09 RX ORDER — PREDNISONE 20 MG/1
20 TABLET ORAL
Qty: 30 | Refills: 0 | Status: ACTIVE | COMMUNITY
Start: 2025-01-09 | End: 1900-01-01

## 2025-01-23 ENCOUNTER — APPOINTMENT (OUTPATIENT)
Dept: PULMONOLOGY | Facility: CLINIC | Age: 35
End: 2025-01-23
Payer: COMMERCIAL

## 2025-01-23 VITALS
HEIGHT: 60 IN | OXYGEN SATURATION: 97 % | WEIGHT: 124 LBS | BODY MASS INDEX: 24.35 KG/M2 | HEART RATE: 90 BPM | DIASTOLIC BLOOD PRESSURE: 60 MMHG | SYSTOLIC BLOOD PRESSURE: 100 MMHG

## 2025-01-23 DIAGNOSIS — J47.9 BRONCHIECTASIS, UNCOMPLICATED: ICD-10-CM

## 2025-01-23 DIAGNOSIS — R07.89 OTHER CHEST PAIN: ICD-10-CM

## 2025-01-23 PROCEDURE — 36415 COLL VENOUS BLD VENIPUNCTURE: CPT

## 2025-01-23 PROCEDURE — 71046 X-RAY EXAM CHEST 2 VIEWS: CPT

## 2025-01-23 PROCEDURE — 99214 OFFICE O/P EST MOD 30 MIN: CPT | Mod: 25

## 2025-01-24 ENCOUNTER — APPOINTMENT (OUTPATIENT)
Dept: CT IMAGING | Facility: CLINIC | Age: 35
End: 2025-01-24
Payer: COMMERCIAL

## 2025-01-24 ENCOUNTER — OUTPATIENT (OUTPATIENT)
Dept: OUTPATIENT SERVICES | Facility: HOSPITAL | Age: 35
LOS: 1 days | End: 2025-01-24
Payer: COMMERCIAL

## 2025-01-24 ENCOUNTER — NON-APPOINTMENT (OUTPATIENT)
Age: 35
End: 2025-01-24

## 2025-01-24 DIAGNOSIS — R07.89 OTHER CHEST PAIN: ICD-10-CM

## 2025-01-24 DIAGNOSIS — Z78.9 OTHER SPECIFIED HEALTH STATUS: Chronic | ICD-10-CM

## 2025-01-24 LAB
ALBUMIN SERPL ELPH-MCNC: 4.5 G/DL
ALP BLD-CCNC: 57 U/L
ALT SERPL-CCNC: 15 U/L
ANION GAP SERPL CALC-SCNC: 16 MMOL/L
AST SERPL-CCNC: 12 U/L
BASOPHILS # BLD AUTO: 0.03 K/UL
BASOPHILS NFR BLD AUTO: 0.2 %
BILIRUB SERPL-MCNC: 0.7 MG/DL
BUN SERPL-MCNC: 13 MG/DL
CALCIUM SERPL-MCNC: 9.9 MG/DL
CHLORIDE SERPL-SCNC: 100 MMOL/L
CO2 SERPL-SCNC: 26 MMOL/L
CREAT SERPL-MCNC: 0.64 MG/DL
DEPRECATED D DIMER PPP IA-ACNC: <150 NG/ML DDU
EGFR: 120 ML/MIN/1.73M2
EOSINOPHIL # BLD AUTO: 0.08 K/UL
EOSINOPHIL NFR BLD AUTO: 0.6 %
GLUCOSE SERPL-MCNC: 77 MG/DL
HCT VFR BLD CALC: 41.9 %
HGB BLD-MCNC: 13.1 G/DL
IMM GRANULOCYTES NFR BLD AUTO: 0.4 %
LYMPHOCYTES # BLD AUTO: 2.58 K/UL
LYMPHOCYTES NFR BLD AUTO: 20.8 %
MAN DIFF?: NORMAL
MCHC RBC-ENTMCNC: 26.6 PG
MCHC RBC-ENTMCNC: 31.3 G/DL
MCV RBC AUTO: 85 FL
MONOCYTES # BLD AUTO: 0.75 K/UL
MONOCYTES NFR BLD AUTO: 6 %
NEUTROPHILS # BLD AUTO: 8.93 K/UL
NEUTROPHILS NFR BLD AUTO: 72 %
PLATELET # BLD AUTO: 363 K/UL
POTASSIUM SERPL-SCNC: 3.8 MMOL/L
PROT SERPL-MCNC: 7.4 G/DL
RBC # BLD: 4.93 M/UL
RBC # FLD: 14.4 %
SODIUM SERPL-SCNC: 141 MMOL/L
WBC # FLD AUTO: 12.42 K/UL

## 2025-01-24 PROCEDURE — 71275 CT ANGIOGRAPHY CHEST: CPT

## 2025-01-24 PROCEDURE — 71275 CT ANGIOGRAPHY CHEST: CPT | Mod: 26

## 2025-02-06 ENCOUNTER — APPOINTMENT (OUTPATIENT)
Dept: PULMONOLOGY | Facility: CLINIC | Age: 35
End: 2025-02-06
Payer: COMMERCIAL

## 2025-02-06 VITALS
TEMPERATURE: 98 F | BODY MASS INDEX: 24.54 KG/M2 | HEART RATE: 115 BPM | DIASTOLIC BLOOD PRESSURE: 76 MMHG | WEIGHT: 125 LBS | OXYGEN SATURATION: 94 % | SYSTOLIC BLOOD PRESSURE: 121 MMHG | HEIGHT: 60 IN | RESPIRATION RATE: 17 BRPM

## 2025-02-06 DIAGNOSIS — J47.9 BRONCHIECTASIS, UNCOMPLICATED: ICD-10-CM

## 2025-02-06 DIAGNOSIS — R93.89 ABNORMAL FINDINGS ON DIAGNOSTIC IMAGING OF OTHER SPECIFIED BODY STRUCTURES: ICD-10-CM

## 2025-02-06 PROCEDURE — G2211 COMPLEX E/M VISIT ADD ON: CPT | Mod: NC

## 2025-02-06 PROCEDURE — 99214 OFFICE O/P EST MOD 30 MIN: CPT

## 2025-02-06 RX ORDER — SODIUM CHLORIDE FOR INHALATION 3 %
3 VIAL, NEBULIZER (ML) INHALATION
Qty: 1 | Refills: 5 | Status: ACTIVE | COMMUNITY
Start: 2025-02-06 | End: 1900-01-01

## 2025-02-06 RX ORDER — BUDESONIDE 0.5 MG/2ML
0.5 INHALANT ORAL
Qty: 60 | Refills: 5 | Status: ACTIVE | COMMUNITY
Start: 2025-02-06 | End: 1900-01-01

## 2025-03-06 ENCOUNTER — APPOINTMENT (OUTPATIENT)
Dept: PULMONOLOGY | Facility: CLINIC | Age: 35
End: 2025-03-06
Payer: COMMERCIAL

## 2025-03-06 VITALS
OXYGEN SATURATION: 96 % | DIASTOLIC BLOOD PRESSURE: 70 MMHG | SYSTOLIC BLOOD PRESSURE: 112 MMHG | HEIGHT: 60 IN | HEART RATE: 91 BPM | WEIGHT: 125 LBS | BODY MASS INDEX: 24.54 KG/M2

## 2025-03-06 DIAGNOSIS — B44.81 ALLERGIC BRONCHOPULMONARY ASPERGILLOSIS: ICD-10-CM

## 2025-03-06 DIAGNOSIS — R93.89 ABNORMAL FINDINGS ON DIAGNOSTIC IMAGING OF OTHER SPECIFIED BODY STRUCTURES: ICD-10-CM

## 2025-03-06 PROCEDURE — 99214 OFFICE O/P EST MOD 30 MIN: CPT

## 2025-03-13 ENCOUNTER — APPOINTMENT (OUTPATIENT)
Dept: ULTRASOUND IMAGING | Facility: CLINIC | Age: 35
End: 2025-03-13

## 2025-03-17 ENCOUNTER — APPOINTMENT (OUTPATIENT)
Dept: ULTRASOUND IMAGING | Facility: CLINIC | Age: 35
End: 2025-03-17
Payer: COMMERCIAL

## 2025-03-17 ENCOUNTER — OUTPATIENT (OUTPATIENT)
Dept: OUTPATIENT SERVICES | Facility: HOSPITAL | Age: 35
LOS: 1 days | End: 2025-03-17
Payer: COMMERCIAL

## 2025-03-17 DIAGNOSIS — Z78.9 OTHER SPECIFIED HEALTH STATUS: Chronic | ICD-10-CM

## 2025-03-17 DIAGNOSIS — R93.89 ABNORMAL FINDINGS ON DIAGNOSTIC IMAGING OF OTHER SPECIFIED BODY STRUCTURES: ICD-10-CM

## 2025-03-17 PROCEDURE — 76775 US EXAM ABDO BACK WALL LIM: CPT | Mod: 26

## 2025-03-17 PROCEDURE — 76775 US EXAM ABDO BACK WALL LIM: CPT

## 2025-04-10 ENCOUNTER — APPOINTMENT (OUTPATIENT)
Dept: INTERNAL MEDICINE | Facility: CLINIC | Age: 35
End: 2025-04-10
Payer: COMMERCIAL

## 2025-04-10 VITALS
OXYGEN SATURATION: 97 % | HEIGHT: 60 IN | TEMPERATURE: 98.1 F | DIASTOLIC BLOOD PRESSURE: 70 MMHG | SYSTOLIC BLOOD PRESSURE: 118 MMHG | WEIGHT: 127 LBS | RESPIRATION RATE: 17 BRPM | HEART RATE: 86 BPM | BODY MASS INDEX: 24.94 KG/M2

## 2025-04-10 DIAGNOSIS — M54.50 LOW BACK PAIN, UNSPECIFIED: ICD-10-CM

## 2025-04-10 PROCEDURE — 99214 OFFICE O/P EST MOD 30 MIN: CPT

## 2025-04-10 RX ORDER — FAMOTIDINE 40 MG/1
40 TABLET, FILM COATED ORAL
Qty: 30 | Refills: 0 | Status: ACTIVE | COMMUNITY
Start: 2025-04-10 | End: 1900-01-01

## 2025-04-10 RX ORDER — METHYLPREDNISOLONE 4 MG/1
4 TABLET ORAL
Qty: 1 | Refills: 0 | Status: ACTIVE | COMMUNITY
Start: 2025-04-10 | End: 1900-01-01

## 2025-04-24 ENCOUNTER — APPOINTMENT (OUTPATIENT)
Dept: MRI IMAGING | Facility: CLINIC | Age: 35
End: 2025-04-24
Payer: COMMERCIAL

## 2025-04-24 ENCOUNTER — OUTPATIENT (OUTPATIENT)
Dept: OUTPATIENT SERVICES | Facility: HOSPITAL | Age: 35
LOS: 1 days | End: 2025-04-24
Payer: COMMERCIAL

## 2025-04-24 DIAGNOSIS — Z78.9 OTHER SPECIFIED HEALTH STATUS: Chronic | ICD-10-CM

## 2025-04-24 DIAGNOSIS — M54.50 LOW BACK PAIN, UNSPECIFIED: ICD-10-CM

## 2025-04-24 PROCEDURE — 72148 MRI LUMBAR SPINE W/O DYE: CPT | Mod: 26

## 2025-04-24 PROCEDURE — 72148 MRI LUMBAR SPINE W/O DYE: CPT

## 2025-04-28 ENCOUNTER — APPOINTMENT (OUTPATIENT)
Dept: ORTHOPEDIC SURGERY | Facility: CLINIC | Age: 35
End: 2025-04-28
Payer: COMMERCIAL

## 2025-04-28 VITALS
HEART RATE: 90 BPM | BODY MASS INDEX: 25.52 KG/M2 | HEIGHT: 60 IN | DIASTOLIC BLOOD PRESSURE: 64 MMHG | WEIGHT: 130 LBS | SYSTOLIC BLOOD PRESSURE: 95 MMHG

## 2025-04-28 DIAGNOSIS — M54.16 RADICULOPATHY, LUMBAR REGION: ICD-10-CM

## 2025-04-28 DIAGNOSIS — M51.26 OTHER INTERVERTEBRAL DISC DISPLACEMENT, LUMBAR REGION: ICD-10-CM

## 2025-04-28 PROCEDURE — 72110 X-RAY EXAM L-2 SPINE 4/>VWS: CPT

## 2025-04-28 PROCEDURE — 72170 X-RAY EXAM OF PELVIS: CPT

## 2025-04-28 PROCEDURE — 99204 OFFICE O/P NEW MOD 45 MIN: CPT | Mod: 25

## 2025-04-28 RX ORDER — GABAPENTIN 100 MG/1
100 CAPSULE ORAL
Qty: 30 | Refills: 2 | Status: ACTIVE | COMMUNITY
Start: 2025-04-28 | End: 1900-01-01

## 2025-05-01 ENCOUNTER — NON-APPOINTMENT (OUTPATIENT)
Age: 35
End: 2025-05-01

## 2025-05-02 ENCOUNTER — APPOINTMENT (OUTPATIENT)
Dept: ENDOCRINOLOGY | Facility: CLINIC | Age: 35
End: 2025-05-02

## 2025-05-12 ENCOUNTER — TRANSCRIPTION ENCOUNTER (OUTPATIENT)
Age: 35
End: 2025-05-12

## 2025-05-12 NOTE — ASU DISCHARGE PLAN (ADULT/PEDIATRIC) - NS MD DC FALL RISK RISK
For information on Fall & Injury Prevention, visit: [email or web address] OR  [email or web address] OR  https://www.cdc.gov/steadi/patient.html

## 2025-05-12 NOTE — ASU DISCHARGE PLAN (ADULT/PEDIATRIC) - FINANCIAL ASSISTANCE
[facility] provides services at a reduced cost to those who are determined to be eligible through [facility]’s financial assistance program. Information regarding [facility]’s financial assistance program can be found by going to [email or web address] or by calling [phone].

## 2025-05-12 NOTE — ASU DISCHARGE PLAN (ADULT/PEDIATRIC) - PROCEDURE
RIGHT LUMBAR [id number] EPIDURAL STEROID INJECTION RIGHT LUMBAR [id number] EPIDURAL STEROID INJECTION

## 2025-05-15 VITALS
HEART RATE: 86 BPM | SYSTOLIC BLOOD PRESSURE: 103 MMHG | DIASTOLIC BLOOD PRESSURE: 76 MMHG | HEIGHT: 60 IN | TEMPERATURE: 98 F | WEIGHT: 126.1 LBS | OXYGEN SATURATION: 97 %

## 2025-05-15 NOTE — H&P PST ADULT - HISTORY OF PRESENT ILLNESS
33 y/o female presents with chronic back pain for 2 years. Reports pain radiating to right leg, complains of numbness and tingling. Pain scale 10/10. Scheduled for right lumbar L4, L5 Transforaminal epidural injection.

## 2025-05-15 NOTE — H&P PST ADULT - NSICDXPROCEDURE_GEN_ALL_CORE_FT
PROCEDURES:  Injection, steroid, lumbar, epidural, transforaminal 15-May-2025 13:16:57 right lumbar L4, L5 Tanisha Ellington

## 2025-05-16 ENCOUNTER — OUTPATIENT (OUTPATIENT)
Dept: OUTPATIENT SERVICES | Facility: HOSPITAL | Age: 35
LOS: 1 days | End: 2025-05-16
Payer: COMMERCIAL

## 2025-05-16 ENCOUNTER — APPOINTMENT (OUTPATIENT)
Dept: ORTHOPEDIC SURGERY | Facility: HOSPITAL | Age: 35
End: 2025-05-16

## 2025-05-16 VITALS
SYSTOLIC BLOOD PRESSURE: 109 MMHG | RESPIRATION RATE: 18 BRPM | HEART RATE: 77 BPM | OXYGEN SATURATION: 100 % | DIASTOLIC BLOOD PRESSURE: 76 MMHG

## 2025-05-16 DIAGNOSIS — M54.16 RADICULOPATHY, LUMBAR REGION: ICD-10-CM

## 2025-05-16 DIAGNOSIS — Z78.9 OTHER SPECIFIED HEALTH STATUS: Chronic | ICD-10-CM

## 2025-05-16 DIAGNOSIS — M51.26 OTHER INTERVERTEBRAL DISC DISPLACEMENT, LUMBAR REGION: ICD-10-CM

## 2025-05-16 PROCEDURE — 64483 NJX AA&/STRD TFRM EPI L/S 1: CPT | Mod: RT

## 2025-05-16 PROCEDURE — 64484 NJX AA&/STRD TFRM EPI L/S EA: CPT | Mod: RT

## 2025-05-16 NOTE — ASU PREOP CHECKLIST - VERIFY SURGICAL SITE/SIDE WITH PATIENT
Problem: Depression  Goal: Refrain from isolation  Description: Interventions:  - Develop a trusting relationship   - Encourage socialization   Outcome: Progressing done

## 2025-06-05 ENCOUNTER — APPOINTMENT (OUTPATIENT)
Dept: PULMONOLOGY | Facility: CLINIC | Age: 35
End: 2025-06-05
Payer: COMMERCIAL

## 2025-06-05 ENCOUNTER — APPOINTMENT (OUTPATIENT)
Dept: INTERNAL MEDICINE | Facility: CLINIC | Age: 35
End: 2025-06-05

## 2025-06-05 VITALS
WEIGHT: 126 LBS | HEART RATE: 79 BPM | BODY MASS INDEX: 20.99 KG/M2 | HEIGHT: 65 IN | SYSTOLIC BLOOD PRESSURE: 124 MMHG | DIASTOLIC BLOOD PRESSURE: 77 MMHG | OXYGEN SATURATION: 97 %

## 2025-06-05 DIAGNOSIS — J47.9 BRONCHIECTASIS, UNCOMPLICATED: ICD-10-CM

## 2025-06-05 PROCEDURE — 99213 OFFICE O/P EST LOW 20 MIN: CPT | Mod: 25

## 2025-06-09 ENCOUNTER — APPOINTMENT (OUTPATIENT)
Dept: ORTHOPEDIC SURGERY | Facility: CLINIC | Age: 35
End: 2025-06-09

## 2025-06-16 ENCOUNTER — APPOINTMENT (OUTPATIENT)
Dept: ORTHOPEDIC SURGERY | Facility: CLINIC | Age: 35
End: 2025-06-16
Payer: COMMERCIAL

## 2025-06-16 PROCEDURE — 99213 OFFICE O/P EST LOW 20 MIN: CPT

## 2025-06-16 RX ORDER — GABAPENTIN 300 MG/1
300 CAPSULE ORAL
Qty: 30 | Refills: 2 | Status: ACTIVE | COMMUNITY
Start: 2025-06-16 | End: 1900-01-01

## 2025-09-04 ENCOUNTER — APPOINTMENT (OUTPATIENT)
Dept: PULMONOLOGY | Facility: CLINIC | Age: 35
End: 2025-09-04
Payer: COMMERCIAL

## 2025-09-04 VITALS — HEIGHT: 63 IN | HEART RATE: 82 BPM | WEIGHT: 127 LBS | BODY MASS INDEX: 22.5 KG/M2 | OXYGEN SATURATION: 98 %

## 2025-09-04 DIAGNOSIS — J47.9 BRONCHIECTASIS, UNCOMPLICATED: ICD-10-CM

## 2025-09-04 PROCEDURE — 99213 OFFICE O/P EST LOW 20 MIN: CPT | Mod: 25

## (undated) DEVICE — DRAPE TOWEL BLUE 17" X 24"

## (undated) DEVICE — TRAY EPIDURAL SINGLE DOSE

## (undated) DEVICE — PREP DURAPREP 6CC

## (undated) DEVICE — NDL SPINAL 22G X 3.5" QUINCKE

## (undated) DEVICE — DRAPE PEDIATRIC DIRECTIONAL INCISION

## (undated) DEVICE — DRSG 4 X 4" 6PLY STERILE

## (undated) DEVICE — DRAPE 1/2 SHEET 40X57"